# Patient Record
Sex: MALE | Race: WHITE | Employment: FULL TIME | URBAN - METROPOLITAN AREA
[De-identification: names, ages, dates, MRNs, and addresses within clinical notes are randomized per-mention and may not be internally consistent; named-entity substitution may affect disease eponyms.]

---

## 2017-01-04 DIAGNOSIS — J45.40 MODERATE PERSISTENT ASTHMA WITHOUT COMPLICATION: Primary | ICD-10-CM

## 2017-01-11 RX ORDER — AMIODARONE HYDROCHLORIDE 200 MG/1
200 TABLET ORAL DAILY
Qty: 30 TAB | Refills: 6 | Status: SHIPPED | OUTPATIENT
Start: 2017-01-11 | End: 2017-05-22 | Stop reason: ALTCHOICE

## 2017-01-11 NOTE — TELEPHONE ENCOUNTER
Requested Prescriptions     Signed Prescriptions Disp Refills    amiodarone (CORDARONE) 200 mg tablet 30 Tab 6     Sig: Take 1 Tab by mouth daily.      Authorizing Provider: Mirlande Parekh     Ordering User: Mahamed Whitten

## 2017-01-14 DIAGNOSIS — E78.00 HYPERCHOLESTEROLEMIA WITHOUT HYPERTRIGLYCERIDEMIA: ICD-10-CM

## 2017-01-14 RX ORDER — ATORVASTATIN CALCIUM 40 MG/1
TABLET, FILM COATED ORAL
Qty: 30 TAB | Refills: 0 | Status: SHIPPED | OUTPATIENT
Start: 2017-01-14 | End: 2017-04-04 | Stop reason: SDUPTHER

## 2017-04-04 ENCOUNTER — OFFICE VISIT (OUTPATIENT)
Dept: INTERNAL MEDICINE CLINIC | Age: 52
End: 2017-04-04

## 2017-04-04 VITALS
HEIGHT: 71 IN | DIASTOLIC BLOOD PRESSURE: 69 MMHG | SYSTOLIC BLOOD PRESSURE: 138 MMHG | OXYGEN SATURATION: 96 % | RESPIRATION RATE: 14 BRPM | BODY MASS INDEX: 31.71 KG/M2 | HEART RATE: 72 BPM | WEIGHT: 226.5 LBS | TEMPERATURE: 97.4 F

## 2017-04-04 DIAGNOSIS — Z13.31 SCREENING FOR DEPRESSION: ICD-10-CM

## 2017-04-04 DIAGNOSIS — I48.0 PAROXYSMAL ATRIAL FIBRILLATION (HCC): ICD-10-CM

## 2017-04-04 DIAGNOSIS — E78.00 HYPERCHOLESTEROLEMIA WITHOUT HYPERTRIGLYCERIDEMIA: ICD-10-CM

## 2017-04-04 DIAGNOSIS — F41.9 ANXIETY: ICD-10-CM

## 2017-04-04 DIAGNOSIS — Z11.59 NEED FOR HEPATITIS C SCREENING TEST: ICD-10-CM

## 2017-04-04 DIAGNOSIS — Z12.11 COLON CANCER SCREENING: ICD-10-CM

## 2017-04-04 DIAGNOSIS — K21.9 GASTROESOPHAGEAL REFLUX DISEASE WITHOUT ESOPHAGITIS: ICD-10-CM

## 2017-04-04 DIAGNOSIS — Z86.79 H/O COMPLETE ATRIOVENTRICULAR BLOCK: ICD-10-CM

## 2017-04-04 DIAGNOSIS — S90.222A CONTUSION OF LESSER TOE OF LEFT FOOT WITH DAMAGE TO NAIL, INITIAL ENCOUNTER: ICD-10-CM

## 2017-04-04 DIAGNOSIS — I47.29 PAROXYSMAL VT: ICD-10-CM

## 2017-04-04 DIAGNOSIS — Q20.5 CORRECTED TRANSPOSITION OF GREAT VESSELS: ICD-10-CM

## 2017-04-04 DIAGNOSIS — I49.5 SINOATRIAL NODE DYSFUNCTION (HCC): ICD-10-CM

## 2017-04-04 DIAGNOSIS — Z95.810 ICD (IMPLANTABLE CARDIOVERTER-DEFIBRILLATOR) IN PLACE: ICD-10-CM

## 2017-04-04 DIAGNOSIS — Z79.899 HIGH RISK MEDICATION USE: ICD-10-CM

## 2017-04-04 DIAGNOSIS — J45.40 MODERATE PERSISTENT ASTHMA WITHOUT COMPLICATION: Primary | ICD-10-CM

## 2017-04-04 RX ORDER — ALPRAZOLAM 0.5 MG/1
0.5 TABLET ORAL
Qty: 20 TAB | Refills: 0 | Status: SHIPPED | OUTPATIENT
Start: 2017-04-04 | End: 2018-08-23 | Stop reason: SDUPTHER

## 2017-04-04 RX ORDER — LORATADINE 10 MG/1
10 TABLET ORAL
COMMUNITY
End: 2018-08-23 | Stop reason: ALTCHOICE

## 2017-04-04 RX ORDER — ALBUTEROL SULFATE 90 UG/1
AEROSOL, METERED RESPIRATORY (INHALATION)
Qty: 1 INHALER | Refills: 2 | Status: SHIPPED | OUTPATIENT
Start: 2017-04-04

## 2017-04-04 RX ORDER — ASPIRIN 325 MG
325 TABLET ORAL DAILY
Qty: 90 TAB | Refills: 3
Start: 2017-04-04 | End: 2022-03-18

## 2017-04-04 RX ORDER — ATORVASTATIN CALCIUM 40 MG/1
TABLET, FILM COATED ORAL
Qty: 30 TAB | Refills: 11 | Status: SHIPPED | OUTPATIENT
Start: 2017-04-04 | End: 2018-04-13 | Stop reason: SDUPTHER

## 2017-04-04 NOTE — PROGRESS NOTES
Reviewed record In preparation for visit and have obtained necessary documentation. Given info on advanced directives. 1. Have you been to the ER, urgent care clinic or hospitalized since your last visit? No     2. Have you seen or consulted any other health care providers outside of the [address] [place] [address] since your last visit? Include any pap smears or colon screening.  Cardiology     [facility] reviewed:

## 2017-04-04 NOTE — MR AVS SNAPSHOT
Visit Information Date & Time Provider Department Dept. Phone Encounter #  
 4/4/2017  3:00 PM William GodoyLayla [phone] [id number] Follow-up Instructions Return in about 1 year (around 4/4/2018) for Asthma, GERD Chol   Fasting lab in 4-6 weeks. .  
  
Your Appointments 5/22/2017  3:20 PM  
ESTABLISHED PATIENT with David Zuleta MD  
[facility] ([address]) Appt Note: previous patient of Dr. Skylar Cantu, and Dr. Jenny Li requested to come back to [facility] due to transportation reasons. Informed pt of the 1 provider change rule. [address]  [address] [facility]  
[address] 1000 [facility]  
  
    
 12/13/2017  3:15 PM  
PACEMAKER with PACEMAKER, STFRANCES  
[facility] (GINGER SCHEDULING) Appt Note: ariela sci icd./stf/new lat schedule [address] [facility] [address] 600 1007 [facility]  
54 [facility] [address] [address] Upcoming Health Maintenance Date Due Hepatitis C Screening 1965 COLONOSCOPY 5/19/2016 DTaP/Tdap/Td series (2 - Td) 12/10/2022 Allergies as of 4/4/2017  Review Complete On: 4/4/2017 By: William Godoy MD  
 No Known Allergies Current Immunizations  Reviewed on 4/4/2017 Name Date H1N1 FLU VACCINE 11/10/2009 Influenza Vaccine 10/6/2016, 10/5/2014 Influenza Vaccine Whole 10/6/2012 Pneumococcal Polysaccharide (PPSV-23) 12/11/2015 Pneumococcal Vaccine (Unspecified Type) 9/1/2006 TD Vaccine 8/1/2004 Tdap 12/10/2012 Reviewed by Vannessa Ang LPN on 3/3/5937 at  8:25 PM  
You Were Diagnosed With   
  
 Codes Comments Moderate persistent asthma without complication    -  Primary ICD-10-CM: J45.40 ICD-9-CM: 493.90  Hypercholesterolemia without hypertriglyceridemia     ICD-10-CM: E78.00 
 ICD-9-CM: 272.0 Anxiety     ICD-10-CM: F41.9 ICD-9-CM: 300.00 Corrected transposition of great vessels     ICD-10-CM: Q20.5 ICD-9-CM: 745.12 Paroxysmal atrial fibrillation (HCC)     ICD-10-CM: I48.0 ICD-9-CM: 427.31 Paroxysmal VT ([address]     ICD-10-CM: I47.2 ICD-9-CM: 427.1 Sinoatrial node dysfunction (HCC)     ICD-10-CM: I49.5 ICD-9-CM: 46.80   
 H/O complete atrioventricular block     ICD-10-CM: Z86.79 
ICD-9-CM: V12.59   
 ICD (implantable cardioverter-defibrillator) in place     ICD-10-CM: Z95.810 ICD-9-CM: V45.02 Gastroesophageal reflux disease without esophagitis     ICD-10-CM: K21.9 ICD-9-CM: 530.81 Contusion of lesser toe of left foot with damage to nail, initial encounter     ICD-10-CM: I04.463X ICD-9-CM: 924.3 Colon cancer screening     ICD-10-CM: Z12.11 ICD-9-CM: V76.51 High risk medication use     ICD-10-CM: Z79.899 ICD-9-CM: V58.69 Need for hepatitis C screening test     ICD-10-CM: Z11.59 
ICD-9-CM: V73.89 Vitals BP Pulse Temp Resp Height(growth percentile) Weight(growth percentile)  
 138/69 (BP 1 Location: Left arm, BP Patient Position: Sitting) 72 97.4 °F (36.3 °C) (Oral) 14 5' 11\" (1.803 m) 226 lb 8 oz (102.7 kg) SpO2 BMI Smoking Status 96% 31.59 kg/m2 Never Smoker Vitals History BMI and BSA Data Body Mass Index Body Surface Area  
 31.59 kg/m 2 2.27 m 2 Preferred Pharmacy Pharmacy Name Phone [facility], [facility] [address] [place] [phone] Your Updated Medication List  
  
   
This list is accurate as of: 4/4/17  4:31 PM.  Always use your most recent med list.  
  
  
  
  
 albuterol 90 mcg/actuation inhaler Commonly known as:  VENTOLIN HFA INHALE TWO PUFFS BY MOUTH EVERY 4 HOURS AS NEEDED FOR  WHEEZING  
  
 ALPRAZolam 0.5 mg tablet Commonly known as:  Pama Bob Take 1 Tab by mouth two (2) times daily as needed for Anxiety. Max Daily Amount: 1 mg. amiodarone 200 mg tablet Commonly known as:  CORDARONE Take 1 Tab by mouth daily. aspirin 325 mg tablet Commonly known as:  ASPIRIN Take 1 Tab by mouth daily. atorvastatin 40 mg tablet Commonly known as:  LIPITOR  
TAKE ONE TABLET BY MOUTH DAILY. CLARITIN 10 mg tablet Generic drug:  loratadine Take 10 mg by mouth.  
  
 enalapril 5 mg tablet Commonly known as:  Amadeo Gaffney Take 1 Tab by mouth two (2) times a day. metoprolol succinate 50 mg XL tablet Commonly known as:  TOPROL-XL  
TAKE ONE TABLET BY MOUTH ONCE DAILY  
  
 mometasone-formoterol 200-5 mcg/actuation HFA inhaler Commonly known as:  Sindyjason Abhi Take 2 Puffs by inhalation two (2) times a day. Prescriptions Printed Refills ALPRAZolam (XANAX) 0.5 mg tablet 0 Sig: Take 1 Tab by mouth two (2) times daily as needed for Anxiety. Max Daily Amount: 1 mg. Class: Print Route: Oral  
  
Prescriptions Sent to Pharmacy Refills  
 atorvastatin (LIPITOR) 40 mg tablet 11 Sig: TAKE ONE TABLET BY MOUTH DAILY. Class: Normal  
 Pharmacy: [facility] Ph #: [phone]  
 albuterol (VENTOLIN HFA) 90 mcg/actuation inhaler 2 Sig: INHALE TWO PUFFS BY MOUTH EVERY 4 HOURS AS NEEDED FOR  WHEEZING Class: Normal  
 Pharmacy: [address] [place] [address], [address] [place] [address] Ph #: [phone] We Performed the Following REFERRAL FOR COLONOSCOPY [GIK242 Custom] REFERRAL TO CARDIOLOGY [KWH77 Custom] Follow-up Instructions Return in about 1 year (around 4/4/2018) for Asthma, GERD Chol   Fasting lab in 4-6 weeks. Sidney Barthel To-Do List   
 05/04/2017 Lab:  CBC WITH AUTOMATED DIFF   
  
 05/04/2017 Lab:  HEPATITIS C AB   
  
 05/04/2017 Lab:  LIPID PANEL   
  
 05/04/2017 Lab:  METABOLIC PANEL, COMPREHENSIVE   
  
 05/04/2017 Lab:  TSH REFLEX TO T4 Referral Information Referral ID Referred By Referred To  
  
 [id number] LITO PETERSONjoann 98   
   305 [facility] [address] [place], [address] [facility] Visits Status Start Date End Date 1 New Request 4/4/17 4/4/18 If your referral has a status of pending review or denied, additional information will be sent to support the outcome of this decision. Referral ID Referred By Referred To  
 [id number] [facility], [address] MD [address]  [address] Phone: [phone] Fax: [phone] Visits Status Start Date End Date 1 New Request 4/4/17 4/4/18 If your referral has a status of pending review or denied, additional information will be sent to support the outcome of this decision. Patient Instructions Restart atorvastatin Fasting lab on 4-6 weeks. Make appointment with Dr Bakari Vegas Take Pepcid AC before supper for reflux Office visit in 12 months. Gastroesophageal Reflux Disease (GERD): Care Instructions Your Care Instructions Gastroesophageal reflux disease (GERD) is the backward flow of stomach acid into the esophagus. The esophagus is the tube that leads from your throat to your stomach. A one-way valve prevents the stomach acid from moving up into this tube. When you have GERD, this valve does not close tightly enough. If you have mild GERD symptoms including heartburn, you may be able to control the problem with antacids or over-the-counter medicine. Changing your diet, losing weight, and making other lifestyle changes can also help reduce symptoms. Follow-up care is a key part of your treatment and safety. Be sure to make and go to all appointments, and call your doctor if you are having problems. Its also a good idea to know your test results and keep a list of the medicines you take. How can you care for yourself at home? · Take your medicines exactly as prescribed. Call your doctor if you think you are having a problem with your medicine. · Your doctor may recommend over-the-counter medicine. For mild or occasional indigestion, antacids, such as Tums, Gaviscon, Mylanta, or Maalox, may help. Your doctor also may recommend over-the-counter acid reducers, such as Pepcid AC, Tagamet HB, Zantac 75, or Prilosec. Read and follow all instructions on the label. If you use these medicines often, talk with your doctor. · Change your eating habits. ¨ Its best to eat several small meals instead of two or three large meals. ¨ After you eat, wait 2 to 3 hours before you lie down. ¨ Chocolate, mint, and alcohol can make GERD worse. ¨ Spicy foods, foods that have a lot of acid (like tomatoes and oranges), and coffee can make GERD symptoms worse in some people. If your symptoms are worse after you eat a certain food, you may want to stop eating that food to see if your symptoms get better. · Do not smoke or chew tobacco. Smoking can make GERD worse. If you need help quitting, talk to your doctor about stop-smoking programs and medicines. These can increase your chances of quitting for good. · If you have GERD symptoms at night, raise the head of your bed 6 to 8 inches by putting the frame on blocks or placing a foam wedge under the head of your mattress. (Adding extra pillows does not work.) · Do not wear tight clothing around your middle. · Lose weight if you need to. Losing just 5 to 10 pounds can help. When should you call for help? Call your doctor now or seek immediate medical care if: 
· You have new or different belly pain. · Your stools are black and tarlike or have streaks of blood. Watch closely for changes in your health, and be sure to contact your doctor if: 
· Your symptoms have not improved after 2 days. · Food seems to catch in your throat or chest. 
Where can you learn more? Go to http://genesis-vicky.info/. Enter Z851 in the search box to learn more about \"Gastroesophageal Reflux Disease (GERD): Care Instructions. \" Current as of: August 9, 2016 Content Version: 11.2 © 0242-9068 [employer]. Care instructions adapted under license by [employer] (which disclaims liability or warranty for this information). If you have questions about a medical condition or this instruction, always ask your healthcare professional. [address] any warranty or liability for your use of this information. Introducing [facility]! Dear Devin Goldman: Thank you for requesting a Zhenai account. Our records indicate that you already have an active Zhenai account. You can access your account anytime at [email or web address] Did you know that you can access your hospital and ER discharge instructions at any time in Zhenai? You can also review all of your test results from your hospital stay or ER visit. Additional Information If you have questions, please visit the Frequently Asked Questions section of the Zhenai website at [email or web address] Remember, Zhenai is NOT to be used for urgent needs. For medical emergencies, dial 911. Now available from your iPhone and Android! Please provide this summary of care documentation to your next provider. Your primary care clinician is listed as Mario Diaz. If you have any questions after today's visit, please call [phone].

## 2017-04-04 NOTE — PROGRESS NOTES
HISTORY OF PRESENT ILLNESS  Hilaria Son is a 46 y.o. male. HPI   He presents for follow up of hypertension, hyperlipidemia. 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 He is asking for a recommendation for a new cardiologist.  He feels those he has seen recently are not very attentive and in some cases he does not even recall being examined. Diet and Lifestyle: generally follows a low sodium diet, low fat diet, exercises sporadically, non-smoker  Medication compliance: complaint all the time except cholesterol med. Thinks it increases reflux  Medication side effects: none  Home BP Monitoring: is well controlled at home, ranging 120's/80's. Cardiovascular ROS:  He complains of palpitations, dyspnea on exertion, dizziness (for seconds at at time). He denies orthopnea, exertional chest pressure/discomfort, claudication, lower extremity edema    He is being seen for follow up of asthma, not currently in exacerbation. taking medications as instructed, no medication side effects noted, no significant ongoing wheezing, using bronchodilator MDI less than twice a week. Hehas shortness of breath walking a few feet. Exercise tolerance is moderate intolerance and energy level is decreased. Uses rescue inhaler:0  times /week    Requests prostate exam    He sustained an injury to the left third toe on 4/1. He dropped a 2 x 4 on his foot. The nail has been disrupted. There continues to be some bleeding from under the nail. The toe is swollen and bruised, but not very painful. Has awakened several times choking from regurgitation. He denies dysphagia or heartburn. He has elevated head of bed and no long lies down soon after eating. This has helped. He presents for follow up of anxiety. Ongoing symptoms include: palpitations. Patient denies: racing thoughts, psychomotor agitation, feelings of losing control, difficulty concentrating. Reported side effects from the treatment: none. PHQ 2 / 9, over the last two weeks 4/4/2017   Little interest or pleasure in doing things Not at all   Feeling down, depressed or hopeless Not at all   Total Score PHQ 2 0     He was born in the [facility] and is a candidate for Hepatitis C screening. Patient Active Problem List   Diagnosis Code    Hypercholesterolemia without hypertriglyceridemia E78.00    Hypertension, essential, benign I10    History of colonoscopy with polypectomy Z98.890, Z86.010    Corrected transposition of great vessels Q20.5    H/O complete atrioventricular block Z86.79    Sinoatrial node dysfunction (HCC) I49.5    Paroxsymal atrial fibrillation I48.91    Paroxysmal VT ([address] I47.2    ICD (implantable cardioverter-defibrillator) in place Z95.810    Moderate persistent asthma without complication V78.96     Past Medical History:   Diagnosis Date    Anxiety     anxiety vs panic disorder    Arrhythmia     Atiral fibrillation, paroxysmal V tach    Asthma     Atrioventricular block, complete ([address]     Congenital heart problem     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  Glucose intolerance 8/25/2010    Hypercholesterolemia     Hypertension     ICD (implantable cardiac defibrillator) in place 6/2008    [facility] Scientific # [id number].  Pacemaker      Past Surgical History:   Procedure Laterality Date    CARDIAC CATHETERIZATION  9/13/2011    Hemodynamics - RA 5, RV 43/12 LVEDP12, RV sat 81% FA 98%, systemic ventricular function diminished, normal coronaries    HX APPENDECTOMY      HX HEART CATHETERIZATION      HX OTHER SURGICAL      Corrected transposition of the great vessels. Quentin Friday PACEMAKER  6/2008     [facility] Scientific # [id number].      Social History     Social History    Marital status:      Spouse name: N/A    Number of children: N/A    Years of education: N/A     Social History Main Topics    Smoking status: Never Smoker    Smokeless tobacco: Former User    Alcohol use 14.4 oz/week     6 Cans of beer, 18 Standard drinks or equivalent per week      Comment: WEEKENDS    Drug use: No    Sexual activity: Yes     Partners: Female     Other Topics Concern    None     Social History Narrative     Family History   Problem Relation Age of Onset    High Cholesterol Mother     COPD Mother     Diabetes Father     COPD Father     Heart Disease Other      No Known Allergies  Current Outpatient Prescriptions   Medication Sig Dispense Refill    loratadine (CLARITIN) 10 mg tablet Take 10 mg by mouth.  aspirin (ASPIRIN) 325 mg tablet Take 1 Tab by mouth daily. 90 Tab 3    ALPRAZolam (XANAX) 0.5 mg tablet Take 1 Tab by mouth two (2) times daily as needed for Anxiety. Max Daily Amount: 1 mg. 20 Tab 0    atorvastatin (LIPITOR) 40 mg tablet TAKE ONE TABLET BY MOUTH DAILY. 30 Tab 11    albuterol (VENTOLIN HFA) 90 mcg/actuation inhaler INHALE TWO PUFFS BY MOUTH EVERY 4 HOURS AS NEEDED FOR  WHEEZING 1 Inhaler 2    amiodarone (CORDARONE) 200 mg tablet Take 1 Tab by mouth daily. 30 Tab 6    mometasone-formoterol (DULERA) 200-5 mcg/actuation HFA inhaler Take 2 Puffs by inhalation two (2) times a day. 1 Inhaler 11    metoprolol succinate (TOPROL-XL) 50 mg XL tablet TAKE ONE TABLET BY MOUTH ONCE DAILY 30 Tab 6    enalapril (VASOTEC) 5 mg tablet Take 1 Tab by mouth two (2) times a day. 180 Tab 1               Review of Systems   Constitutional: Positive for malaise/fatigue. Negative for weight loss. Gastrointestinal: Positive for heartburn. Negative for constipation and diarrhea. Genitourinary: Negative for frequency and urgency. Musculoskeletal: Positive for back pain and joint pain. Neurological: Positive for tingling (in both arms and sometimes legs; he thinks from amiodarone ).  Negative for dizziness and focal weakness. Visit Vitals    /69 (BP 1 Location: Left arm, BP Patient Position: Sitting)    Pulse 72    Temp 97.4 °F (36.3 °C) (Oral)    Resp 14    Ht 5' 11\" (1.803 m)    Wt 226 lb 8 oz (102.7 kg)    SpO2 96%    BMI 31.59 kg/m2     Physical Exam   Constitutional: He is oriented to person, place, and time. He appears well-developed and well-nourished. HENT:   Head: Normocephalic and atraumatic. Eyes: Conjunctivae are normal. Pupils are equal, round, and reactive to light. Neck: Neck supple. Carotid bruit is not present. No thyromegaly present. Cardiovascular: Normal rate, regular rhythm and S2 normal.  PMI is not displaced. Exam reveals no gallop. Murmur heard. Systolic (At the base, ) murmur is present with a grade of 2/6   Pulses:       Dorsalis pedis pulses are 2+ on the right side, and 2+ on the left side. Posterior tibial pulses are 2+ on the right side, and 2+ on the left side. Very loud S1   Pulmonary/Chest: Effort normal. He has no wheezes. He has no rhonchi. He has no rales. Abdominal: Soft. Normal appearance. He exhibits no abdominal bruit and no mass. There is no hepatosplenomegaly. There is no tenderness. Genitourinary: Prostate normal. Rectal exam shows no mass, no tenderness and anal tone normal. Prostate is not enlarged and not tender. Musculoskeletal: He exhibits no edema. Feet:    Lymphadenopathy:     He has no cervical adenopathy. Right: No supraclavicular adenopathy present. Left: No inguinal and no supraclavicular adenopathy present. Neurological: He is alert and oriented to person, place, and time. No sensory deficit. Skin: Skin is warm, dry and intact. No rash noted. Psychiatric: He has a normal mood and affect. His behavior is normal.   Nursing note and vitals reviewed.     Results for orders placed or performed in visit on 10/31/16   TSH 3RD GENERATION   Result Value Ref Range    TSH 3.380 0.450 - 4.500 uIU/mL HEPATIC FUNCTION PANEL   Result Value Ref Range    Protein, total 7.0 6.0 - 8.5 g/dL    Albumin 4.7 3.5 - 5.5 g/dL    Bilirubin, total 0.5 0.0 - 1.2 mg/dL    Bilirubin, direct 0.13 0.00 - 0.40 mg/dL    Alk. phosphatase 52 39 - 117 IU/L    AST (SGOT) 17 0 - 40 IU/L    ALT (SGPT) 23 0 - 44 IU/L     Lab Results   Component Value Date/Time    Cholesterol, total 190 08/03/2015 10:29 AM    HDL Cholesterol 56 08/03/2015 10:29 AM    LDL, calculated 115 08/03/2015 10:29 AM    VLDL, calculated 19 08/03/2015 10:29 AM    Triglyceride 96 08/03/2015 10:29 AM    CHOL/HDL Ratio 3.0 11/04/2009 09:05 AM       ASSESSMENT and PLAN    ICD-10-CM ICD-9-CM    1. Moderate persistent asthma without complication E63.46 384.07 albuterol (VENTOLIN HFA) 90 mcg/actuation inhaler   2. Hypercholesterolemia without hypertriglyceridemia E78.00 272.0 aspirin (ASPIRIN) 325 mg tablet      LIPID PANEL      METABOLIC PANEL, COMPREHENSIVE      atorvastatin (LIPITOR) 40 mg tablet   3. Anxiety F41.9 300.00 ALPRAZolam (XANAX) 0.5 mg tablet   4. Corrected transposition of great vessels Q20.5 745.12 REFERRAL TO CARDIOLOGY   5. Gastroesophageal reflux disease without esophagitis K21.9 530.81    6. Contusion of lesser toe of left foot with damage to nail, initial encounter S90.222A 924.3    7. Paroxysmal atrial fibrillation (HCC) I48.0 427.31 REFERRAL TO CARDIOLOGY   8. Paroxysmal VT (HCC) I47.2 427.1 REFERRAL TO CARDIOLOGY   9. Sinoatrial node dysfunction (HCC) I49.5 427.81 REFERRAL TO CARDIOLOGY   10. H/O complete atrioventricular block Z86.79 V12.59 REFERRAL TO CARDIOLOGY   11. ICD (implantable cardioverter-defibrillator) in place Z95.810 V45.02 REFERRAL TO CARDIOLOGY   12. Colon cancer screening Z12.11 V76.51 REFERRAL FOR COLONOSCOPY   13. High risk medication use Z79.899 V58.69 TSH REFLEX TO T4      CBC WITH AUTOMATED DIFF   14.  Need for hepatitis C screening test Z11.59 V73.89 HEPATITIS C AB         Moderate persistent asthma without complication  - albuterol (VENTOLIN HFA) 90 mcg/actuation inhaler; INHALE TWO PUFFS BY MOUTH EVERY 4 HOURS AS NEEDED FOR  WHEEZING    Hypercholesterolemia without hypertriglyceridemia  Hyperlipidemia is borderline controlled. -     aspirin (ASPIRIN) 325 mg tablet; Take 1 Tab by mouth daily.  -     LIPID PANEL; Future  -     METABOLIC PANEL, COMPREHENSIVE; Future  -     atorvastatin (LIPITOR) 40 mg tablet; TAKE ONE TABLET BY MOUTH DAILY. Anxiety  -     ALPRAZolam (XANAX) 0.5 mg tablet; Take 1 Tab by mouth two (2) times daily as needed for Anxiety. Max Daily Amount: 1 mg. Corrected transposition of great vessels  -     REFERRAL TO CARDIOLOGY    Gastroesophageal reflux disease without esophagitis  He will take over-the-counter Pepcid before supper  . Contusion of lesser toe of left foot with damage to nail, initial encounter  He is told he will lose the nail. It is possible that there is a broken bone, but there is little to do about that and he declines an x-ray.  l  Paroxysmal atrial fibrillation ([address]  -     REFERRAL TO CARDIOLOGY    Paroxysmal VT ([address]  -     REFERRAL TO CARDIOLOGY    Sinoatrial node dysfunction ([address]  -     REFERRAL TO CARDIOLOGY    H/O complete atrioventricular block  -     REFERRAL TO CARDIOLOGY    ICD (implantable cardioverter-defibrillator) in place  -     REFERRAL TO CARDIOLOGY    Colon cancer screening  -     REFERRAL FOR COLONOSCOPY    High risk medication use  -     TSH REFLEX TO T4; Future  -     CBC WITH AUTOMATED DIFF; Future    Need for hepatitis C screening test  -     HEPATITIS C AB; Future      Follow-up Disposition:  Return in about 1 year (around 4/4/2018) for Asthma, GERD Chol   Fasting lab in 4-6 weeks.  .  lab results and schedule of future lab studies reviewed with patient  reviewed diet, exercise and weight control  cardiovascular risk and specific lipid/LDL goals reviewed

## 2017-04-04 NOTE — PATIENT INSTRUCTIONS
Restart atorvastatin  Fasting lab on 4-6 weeks. Make appointment with Dr Lizzie Hare before supper for reflux  Office visit in 12 months. Gastroesophageal Reflux Disease (GERD): Care Instructions  Your Care Instructions    Gastroesophageal reflux disease (GERD) is the backward flow of stomach acid into the esophagus. The esophagus is the tube that leads from your throat to your stomach. A one-way valve prevents the stomach acid from moving up into this tube. When you have GERD, this valve does not close tightly enough. If you have mild GERD symptoms including heartburn, you may be able to control the problem with antacids or over-the-counter medicine. Changing your diet, losing weight, and making other lifestyle changes can also help reduce symptoms. Follow-up care is a key part of your treatment and safety. Be sure to make and go to all appointments, and call your doctor if you are having problems. Its also a good idea to know your test results and keep a list of the medicines you take. How can you care for yourself at home? · Take your medicines exactly as prescribed. Call your doctor if you think you are having a problem with your medicine. · Your doctor may recommend over-the-counter medicine. For mild or occasional indigestion, antacids, such as Tums, Gaviscon, Mylanta, or Maalox, may help. Your doctor also may recommend over-the-counter acid reducers, such as Pepcid AC, Tagamet HB, Zantac 75, or Prilosec. Read and follow all instructions on the label. If you use these medicines often, talk with your doctor. · Change your eating habits. ¨ Its best to eat several small meals instead of two or three large meals. ¨ After you eat, wait 2 to 3 hours before you lie down. ¨ Chocolate, mint, and alcohol can make GERD worse. ¨ Spicy foods, foods that have a lot of acid (like tomatoes and oranges), and coffee can make GERD symptoms worse in some people.  If your symptoms are worse after you eat a certain food, you may want to stop eating that food to see if your symptoms get better. · Do not smoke or chew tobacco. Smoking can make GERD worse. If you need help quitting, talk to your doctor about stop-smoking programs and medicines. These can increase your chances of quitting for good. · If you have GERD symptoms at night, raise the head of your bed 6 to 8 inches by putting the frame on blocks or placing a foam wedge under the head of your mattress. (Adding extra pillows does not work.)  · Do not wear tight clothing around your middle. · Lose weight if you need to. Losing just 5 to 10 pounds can help. When should you call for help? Call your doctor now or seek immediate medical care if:  · You have new or different belly pain. · Your stools are black and tarlike or have streaks of blood. Watch closely for changes in your health, and be sure to contact your doctor if:  · Your symptoms have not improved after 2 days. · Food seems to catch in your throat or chest.  Where can you learn more? Go to http://genesis-vicky.info/. Enter U392 in the search box to learn more about \"Gastroesophageal Reflux Disease (GERD): Care Instructions. \"  Current as of: August 9, 2016  Content Version: 11.2  © 8996-8081 [employer]. Care instructions adapted under license by [employer] (which disclaims liability or warranty for this information). If you have questions about a medical condition or this instruction, always ask your healthcare professional. [place] [address] any warranty or liability for your use of this information.

## 2017-04-10 ENCOUNTER — TELEPHONE (OUTPATIENT)
Dept: CARDIOLOGY CLINIC | Age: 52
End: 2017-04-10

## 2017-04-10 NOTE — TELEPHONE ENCOUNTER
Pt need a call back because he's having a Colonoscopy on 4/21 and need to know if he need to stop any of his medications. He can be reached at [phone].  [employer]

## 2017-04-10 NOTE — TELEPHONE ENCOUNTER
Verified patient with two types of identifiers. Patient wanted to check to see if he needed to hold anything. The GI doctor states that they did not need him to hold anything but he wanted to check with us. Notified him that he did not need to hold any medications prior to the colonoscopy. Notified him to let the GI doc know he has an ICD as this may require an order from us. He states that the GI doctor knows about his ICD and that they did not need anything else from us. Will call if any other questions.

## 2017-04-21 ENCOUNTER — ANESTHESIA (OUTPATIENT)
Dept: ENDOSCOPY | Age: 52
End: 2017-04-21
Payer: COMMERCIAL

## 2017-04-21 ENCOUNTER — HOSPITAL ENCOUNTER (OUTPATIENT)
Age: 52
Setting detail: OUTPATIENT SURGERY
Discharge: HOME | End: 2017-04-21
Attending: INTERNAL MEDICINE | Admitting: INTERNAL MEDICINE
Payer: COMMERCIAL

## 2017-04-21 ENCOUNTER — ANESTHESIA EVENT (OUTPATIENT)
Dept: ENDOSCOPY | Age: 52
End: 2017-04-21
Payer: COMMERCIAL

## 2017-04-21 VITALS
HEIGHT: 71 IN | BODY MASS INDEX: 31.16 KG/M2 | OXYGEN SATURATION: 99 % | RESPIRATION RATE: 18 BRPM | HEART RATE: 69 BPM | TEMPERATURE: 97.6 F | WEIGHT: 222.56 LBS | DIASTOLIC BLOOD PRESSURE: 74 MMHG | SYSTOLIC BLOOD PRESSURE: 123 MMHG

## 2017-04-21 PROCEDURE — 74011000250 HC RX REV CODE- 250

## 2017-04-21 PROCEDURE — 74011250636 HC RX REV CODE- 250/636: Performed by: INTERNAL MEDICINE

## 2017-04-21 PROCEDURE — 76060000031 HC ANESTHESIA FIRST 0.5 HR: Performed by: INTERNAL MEDICINE

## 2017-04-21 PROCEDURE — 74011250636 HC RX REV CODE- 250/636

## 2017-04-21 PROCEDURE — 76040000019: Performed by: INTERNAL MEDICINE

## 2017-04-21 PROCEDURE — 74011250637 HC RX REV CODE- 250/637: Performed by: INTERNAL MEDICINE

## 2017-04-21 RX ORDER — SODIUM CHLORIDE 0.9 % (FLUSH) 0.9 %
5-10 SYRINGE (ML) INJECTION AS NEEDED
Status: DISCONTINUED | OUTPATIENT
Start: 2017-04-21 | End: 2017-04-21 | Stop reason: HOSPADM

## 2017-04-21 RX ORDER — DEXTROMETHORPHAN/PSEUDOEPHED 2.5-7.5/.8
1.2 DROPS ORAL
Status: DISCONTINUED | OUTPATIENT
Start: 2017-04-21 | End: 2017-04-21 | Stop reason: HOSPADM

## 2017-04-21 RX ORDER — SODIUM CHLORIDE 0.9 % (FLUSH) 0.9 %
5-10 SYRINGE (ML) INJECTION EVERY 8 HOURS
Status: DISCONTINUED | OUTPATIENT
Start: 2017-04-21 | End: 2017-04-21 | Stop reason: HOSPADM

## 2017-04-21 RX ORDER — SODIUM CHLORIDE 9 MG/ML
50 INJECTION, SOLUTION INTRAVENOUS CONTINUOUS
Status: DISCONTINUED | OUTPATIENT
Start: 2017-04-21 | End: 2017-04-21 | Stop reason: HOSPADM

## 2017-04-21 RX ORDER — MIDAZOLAM HYDROCHLORIDE 1 MG/ML
1-2 INJECTION, SOLUTION INTRAMUSCULAR; INTRAVENOUS
Status: DISCONTINUED | OUTPATIENT
Start: 2017-04-21 | End: 2017-04-21 | Stop reason: HOSPADM

## 2017-04-21 RX ORDER — PROPOFOL 10 MG/ML
INJECTION, EMULSION INTRAVENOUS AS NEEDED
Status: DISCONTINUED | OUTPATIENT
Start: 2017-04-21 | End: 2017-04-21 | Stop reason: HOSPADM

## 2017-04-21 RX ORDER — LIDOCAINE HYDROCHLORIDE 20 MG/ML
INJECTION, SOLUTION EPIDURAL; INFILTRATION; INTRACAUDAL; PERINEURAL AS NEEDED
Status: DISCONTINUED | OUTPATIENT
Start: 2017-04-21 | End: 2017-04-21 | Stop reason: HOSPADM

## 2017-04-21 RX ADMIN — SIMETHICONE 80 MG: 20 SUSPENSION/ DROPS ORAL at 07:59

## 2017-04-21 RX ADMIN — SODIUM CHLORIDE 50 ML/HR: 900 INJECTION, SOLUTION INTRAVENOUS at 07:25

## 2017-04-21 RX ADMIN — PROPOFOL 200 MG: 10 INJECTION, EMULSION INTRAVENOUS at 08:13

## 2017-04-21 RX ADMIN — LIDOCAINE HYDROCHLORIDE 40 MG: 20 INJECTION, SOLUTION EPIDURAL; INFILTRATION; INTRACAUDAL; PERINEURAL at 07:52

## 2017-04-21 NOTE — ROUTINE PROCESS
Cornell Fraustomelva  1965  [id number]    Situation:  Verbal report received from: Yarely Wells RN    Procedure: Procedure(s):  COLONOSCOPY    Background:    Preoperative diagnosis: SCREENING  Postoperative diagnosis: normal colon exam    :  Dr. Keira Dennis  Assistant(s): Endoscopy RN-1: Kathryn Meier RN  Endoscopy RN-2: Kavon Rater    Specimens: * No specimens in log *  H. Pylori  no    Assessment:  Intra-procedure medications     Anesthesia gave intra-procedure sedation and medications, see anesthesia flow sheet yes    Intravenous fluids: NS@ KVO     Vital signs stable     Abdominal assessment: round and soft     Recommendation:  Discharge patient per MD order.     Family or Friend   Permission to share finding with family or friend yes

## 2017-04-21 NOTE — ANESTHESIA PREPROCEDURE EVALUATION
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     EKG: pacemaker    3-2015 ECHO: 45% EF, mild AR, MR, TR, and WV   GI/Hepatic/Renal     GERD           Endo/Other  Within defined limits           Other Findings   Comments: 18 oz per week           Physical Exam    Airway  Mallampati: II  TM Distance: 4 - 6 cm  Neck ROM: normal range of motion   Mouth opening: Normal     Cardiovascular    Rhythm: regular  Rate: normal         Dental  No notable dental hx       Pulmonary  Breath sounds clear to auscultation               Abdominal  GI exam deferred       Other Findings            Anesthetic Plan    ASA: 4  Anesthesia type: total IV anesthesia          Induction: Intravenous  Anesthetic plan and risks discussed with: Patient      Pt took hrt and BP meds this am    /89   HR 81  Oxygen Saturation 96% on room air

## 2017-04-21 NOTE — PERIOP NOTES
Anesthesia reports 200 mg Propofol, 40 mg Lidocaine and 400 mL NS given during procedure. Received report from anesthesia staff on vital signs and status of patient.

## 2017-04-21 NOTE — DISCHARGE INSTRUCTIONS
Samia Carney  [id number]  1965    COLON DISCHARGE INSTRUCTIONS  Discomfort:  Redness at IV site- apply warm compress to area; if redness or soreness persist- contact your physician  There may be a slight amount of blood passed from the rectum  Gaseous discomfort- walking, belching will help relieve any discomfort  You may not operate a vehicle for 12 hours  You may not engage in an occupation involving machinery or appliances for rest of today  You may not drink alcoholic beverages for at least 12 hours  Avoid making any critical decisions for at least 24 hour  DIET:   Regular diet    - however -  remember your colon is empty and a heavy meal will produce gas. ACTIVITY:  It is recommended that you spend the remainder of the day resting -  avoid any strenuous activity. CALL M.D. ANY SIGN OF:   Increasing pain, nausea, vomiting  Abdominal distension (swelling)  New increased bleeding (oral or rectal)  Fever (chills)    Follow-up Instructions:   Call Dr. Debi Mobley  Telephone # [phone]  Brief Findings: normal        DISCHARGE SUMMARY from Nurse    The following personal items collected during your admission are returned to you:   Dental Appliance: Dental Appliances: None  Vision: Visual Aid: Glasses  Hearing Aid:    Jewelry:    Clothing:    Other Valuables:    Valuables sent to safe: Cantimer Activation    Thank you for requesting access to Cantimer. Please follow the instructions below to securely access and download your online medical record. Cantimer allows you to send messages to your doctor, view your test results, renew your prescriptions, schedule appointments, and more. How Do I Sign Up? 1. In your internet browser, go to [email or web address]  2. Click on the First Time User? Click Here link in the Sign In box. You will be redirect to the New Member Sign Up page. 3. Enter your Cantimer Access Code exactly as it appears below.  You will not need to use this code after youve completed the sign-up process. If you do not sign up before the expiration date, you must request a new code. GeoVario Access Code: Activation code not generated  Current GeoVario Status: Active (This is the date your GeoVario access code will )    4. Enter the last four digits of your Social Security Number (xxxx) and Date of Birth (mm/dd/yyyy) as indicated and click Submit. You will be taken to the next sign-up page. 5. Create a Haozu.comt ID. This will be your GeoVario login ID and cannot be changed, so think of one that is secure and easy to remember. 6. Create a GeoVario password. You can change your password at any time. 7. Enter your Password Reset Question and Answer. This can be used at a later time if you forget your password. 8. Enter your e-mail address. You will receive e-mail notification when new information is available in [address] 9. Click Sign Up. You can now view and download portions of your medical record. 10. Click the Download Summary menu link to download a portable copy of your medical information. Additional Information    If you have questions, please visit the Frequently Asked Questions section of the GeoVario website at [email or web address] Remember, GeoVario is NOT to be used for urgent needs. For medical emergencies, dial 911.

## 2017-04-21 NOTE — IP AVS SNAPSHOT
[address] [facility] 
[phone] Patient: Kade Orona MRN: [id number] :1965 You are allergic to the following No active allergies Recent Documentation Height Weight BMI Smoking Status 1.803 m 101 kg 31.04 kg/m2 Never Smoker Emergency Contacts Name Discharge Info Relation Home Work Mobile Kelsey Bentley  Spouse [3] [phone] About your hospitalization You were admitted on:  2017 You last received care in the:  \[facility]\"" ENDOSCOPY You were discharged on:  2017 Unit phone number:  [phone] Why you were hospitalized Your primary diagnosis was:  Not on File Providers Seen During Your Hospitalizations Provider Role Specialty Primary office phone Wei Ragland MD Attending Provider Gastroenterology [phone] Your Primary Care Physician (PCP) Primary Care Physician Office Phone Office Fax 8996 [facility], [address] [place] [address] Road [phone] Follow-up Information None Your Appointments Tuesday May 16, 2017  8:15 AM EDT  
LAB with LAB [facility] Abel Zelaya [facility]) [address] [place] [address] [phone] Monday May 22, 2017  3:20 PM EDT  
ESTABLISHED PATIENT with Clara Sheriff MD  
[facility] ([facility]) [address] [place] [address] [address]  
[phone] Current Discharge Medication List  
  
CONTINUE these medications which have CHANGED Dose & Instructions Dispensing Information Comments Morning Noon Evening Bedtime  
 atorvastatin 40 mg tablet Commonly known as:  LIPITOR What changed:   
- when to take this 
- additional instructions Your last dose was: Your next dose is: TAKE ONE TABLET BY MOUTH DAILY. Quantity:  30 Tab Refills:  11  
     
   
   
   
  
 mometasone-formoterol 200-5 mcg/actuation HFA inhaler Commonly known as:  Saundra Rain What changed:   
- when to take this 
- reasons to take this Your last dose was: Your next dose is:    
   
   
 Dose:  2 Puff Take 2 Puffs by inhalation two (2) times a day. Quantity:  1 Inhaler Refills:  11 CONTINUE these medications which have NOT CHANGED Dose & Instructions Dispensing Information Comments Morning Noon Evening Bedtime  
 albuterol 90 mcg/actuation inhaler Commonly known as:  VENTOLIN HFA Your last dose was: Your next dose is:    
   
   
 INHALE TWO PUFFS BY MOUTH EVERY 4 HOURS AS NEEDED FOR  WHEEZING Quantity:  1 Inhaler Refills:  2 ALPRAZolam 0.5 mg tablet Commonly known as:  Honey Gallery Your last dose was: Your next dose is:    
   
   
 Dose:  0.5 mg Take 1 Tab by mouth two (2) times daily as needed for Anxiety. Max Daily Amount: 1 mg. Quantity:  20 Tab Refills:  0  
     
   
   
   
  
 amiodarone 200 mg tablet Commonly known as:  CORDARONE Your last dose was: Your next dose is:    
   
   
 Dose:  200 mg Take 1 Tab by mouth daily. Quantity:  30 Tab Refills:  6  
     
   
   
   
  
 aspirin 325 mg tablet Commonly known as:  ASPIRIN Your last dose was: Your next dose is:    
   
   
 Dose:  325 mg Take 1 Tab by mouth daily. Quantity:  90 Tab Refills:  3 CLARITIN 10 mg tablet Generic drug:  loratadine Your last dose was: Your next dose is:    
   
   
 Dose:  10 mg Take 10 mg by mouth. Refills:  0  
     
   
   
   
  
 enalapril 5 mg tablet Commonly known as:  Tiny Spray Your last dose was: Your next dose is:    
   
   
 Dose:  5 mg Take 1 Tab by mouth two (2) times a day. Quantity:  180 Tab Refills:  1 metoprolol succinate 50 mg XL tablet Commonly known as:  TOPROL-XL Your last dose was: Your next dose is: TAKE ONE TABLET BY MOUTH ONCE DAILY Quantity:  30 Tab Refills:  6 Discharge Instructions Willian Amaro [id number] 
1965 COLON DISCHARGE INSTRUCTIONS Discomfort: 
Redness at IV site- apply warm compress to area; if redness or soreness persist- contact your physician There may be a slight amount of blood passed from the rectum Gaseous discomfort- walking, belching will help relieve any discomfort You may not operate a vehicle for 12 hours You may not engage in an occupation involving machinery or appliances for rest of today You may not drink alcoholic beverages for at least 12 hours Avoid making any critical decisions for at least 24 hour DIET: 
 Regular diet  however -  remember your colon is empty and a heavy meal will produce gas. ACTIVITY: It is recommended that you spend the remainder of the day resting -  avoid any strenuous activity. CALL M.D. ANY SIGN OF: Increasing pain, nausea, vomiting Abdominal distension (swelling) New increased bleeding (oral or rectal) Fever (chills) Follow-up Instructions: 
 Call Dr. Daisy Guallpa Telephone # [phone] Brief Findings: normal 
 
 
 
DISCHARGE SUMMARY from Nurse The following personal items collected during your admission are returned to you:  
Dental Appliance: Dental Appliances: None Vision: Visual Aid: Glasses Hearing Aid:   
Jewelry:   
Clothing:   
Other Valuables:   
Valuables sent to safe: MyChart Activation Thank you for requesting access to [employer]. Please follow the instructions below to securely access and download your online medical record. [employer] allows you to send messages to your doctor, view your test results, renew your prescriptions, schedule appointments, and more. How Do I Sign Up? 1. In your internet browser, go to [email or web address] 
2. Click on the First Time User? Click Here link in the Sign In box. You will be redirect to the New Member Sign Up page. 3. Enter your Arccos Golf Access Code exactly as it appears below. You will not need to use this code after youve completed the sign-up process. If you do not sign up before the expiration date, you must request a new code. Startup Genomehart Access Code: Activation code not generated Current Arccos Golf Status: Active (This is the date your Gri[employer]t access code will ) 4. Enter the last four digits of your Social Security Number (xxxx) and Date of Birth (mm/dd/yyyy) as indicated and click Submit. You will be taken to the next sign-up page. 5. Create a Arccos Golf ID. This will be your Arccos Golf login ID and cannot be changed, so think of one that is secure and easy to remember. 6. Create a Arccos Golf password. You can change your password at any time. 7. Enter your Password Reset Question and Answer. This can be used at a later time if you forget your password. 8. Enter your e-mail address. You will receive e-mail notification when new information is available in [address] 9. Click Sign Up. You can now view and download portions of your medical record. 10. Click the Download Summary menu link to download a portable copy of your medical information. Additional Information If you have questions, please visit the Frequently Asked Questions section of the Arccos Golf website at [email or web address] Remember, Arccos Golf is NOT to be used for urgent needs. For medical emergencies, dial 911. Discharge Orders None Introducing hospitals & [facility] SERVICES! Dear Chandrakant Getting: Thank you for requesting a Arccos Golf account. Our records indicate that you already have an active Arccos Golf account. You can access your account anytime at [email or web address] Did you know that you can access your hospital and ER discharge instructions at any time in MyChart? You can also review all of your test results from your hospital stay or ER visit. Additional Information If you have questions, please visit the Frequently Asked Questions section of the [employer] website at [email or web address] Remember, MyChart is NOT to be used for urgent needs. For medical emergencies, dial 911. Now available from your iPhone and Android! General Information Please provide this summary of care documentation to your next provider. Patient Signature:  ____________________________________________________________ Date:  ____________________________________________________________  
  
[facility] Provider Signature:  ____________________________________________________________ Date:  ____________________________________________________________

## 2017-04-21 NOTE — H&P
Gastroenterology History and Physical    Patient: Nisreen Rosenbaum    Physician: Ty Stratton MD    Vital Signs: Blood pressure 146/89, pulse 85, temperature 97.9 °F (36.6 °C), resp. rate 13, height 5' 11\" (1.803 m), weight 101 kg (222 lb 9 oz), SpO2 100 %. 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  Pacemaker       Past Surgical History:   Procedure Laterality Date    CARDIAC CATHETERIZATION  9/13/2011    Hemodynamics - RA 5, RV 43/12 LVEDP12, RV sat 81% FA 98%, systemic ventricular function diminished, normal coronaries    HX APPENDECTOMY      HX HEART CATHETERIZATION      HX PACEMAKER  6/2008     [facility] Bright Pattern # [id number].       Social History     Social History    Marital status:      Spouse name: N/A    Number of children: N/A    Years of education: N/A     Social History Main Topics    Smoking status: Never Smoker    Smokeless tobacco: Former User    Alcohol use 18.0 oz/week     12 Cans of beer, 18 Standard drinks or equivalent per week      Comment: 1-12 beers per week    Drug use: No    Sexual activity: Yes     Partners: Female     Other Topics Concern    None     Social History Narrative      Family History   Problem Relation Age of Onset    High Cholesterol Mother     COPD Mother     Diabetes Father     COPD Father     No Known Problems Sister     Heart Disease Other        Medications:   Prior to Admission medications    Medication Sig Start Date End Date Taking? Authorizing Provider   loratadine (CLARITIN) 10 mg tablet Take 10 mg by mouth. Yes Historical Provider   aspirin (ASPIRIN) 325 mg tablet Take 1 Tab by mouth daily. 4/4/17  Yes Blayne Melchor MD   atorvastatin (LIPITOR) 40 mg tablet TAKE ONE TABLET BY MOUTH DAILY. Patient taking differently: every evening. TAKE ONE TABLET BY MOUTH DAILY. 4/4/17  Yes Blayne Melchor MD   amiodarone (CORDARONE) 200 mg tablet Take 1 Tab by mouth daily. 1/11/17  Yes Bro Flores MD   metoprolol succinate (TOPROL-XL) 50 mg XL tablet TAKE ONE TABLET BY MOUTH ONCE DAILY 12/13/16  Yes Bro Flores MD   enalapril (VASOTEC) 5 mg tablet Take 1 Tab by mouth two (2) times a day. 8/11/16  Yes Jose Lopez MD   ALPRAZolam Juvenal Bolder) 0.5 mg tablet Take 1 Tab by mouth two (2) times daily as needed for Anxiety. Max Daily Amount: 1 mg. 4/4/17   Blayne Melchor MD   albuterol (VENTOLIN HFA) 90 mcg/actuation inhaler INHALE TWO PUFFS BY MOUTH EVERY 4 HOURS AS NEEDED FOR  WHEEZING 4/4/17   Blayne Melchor MD   mometasone-formoterol [facility]) 200-5 mcg/actuation HFA inhaler Take 2 Puffs by inhalation two (2) times a day. Patient taking differently: Take 2 Puffs by inhalation as needed. 1/4/17   Blayne Melchor MD       Physical Exam:   HEENT: Head: Normocephalic, no lesions, without obvious abnormality.    Heart: regular rate and rhythm, S1, S2 normal, no murmur, click, rub or gallop   Lungs: chest clear, no wheezing, rales, normal symmetric air entry, Heart exam - S1, S2 normal, no murmur, no gallop, rate regular   Abdominal: Bowel sounds are normal, liver is not enlarged, spleen is not enlarged     Signed:  Андрей Pope MD 4/21/2017

## 2017-04-21 NOTE — ANESTHESIA POSTPROCEDURE EVALUATION
Post-Anesthesia Evaluation and Assessment    Patient: Wilton Miguel MRN: [id number]  SSN: [id number]    YOB: 1965  Age: 46 y.o. Sex: male       Cardiovascular Function/Vital Signs  Visit Vitals    /74    Pulse 69    Temp 36.4 °C (97.6 °F)    Resp 18    Ht 5' 11\" (1.803 m)    Wt 101 kg (222 lb 9 oz)    SpO2 99%    BMI 31.04 kg/m2       Patient is status post total IV anesthesia anesthesia for Procedure(s):  COLONOSCOPY. Nausea/Vomiting: None    Postoperative hydration reviewed and adequate. Pain:  Pain Scale 1: Numeric (0 - 10) (04/21/17 5637)  Pain Intensity 1: 0 (04/21/17 7588)   Managed    Neurological Status: At baseline    Mental Status and Level of Consciousness: Arousable    Pulmonary Status:   O2 Device: Room air (04/21/17 1379)   Adequate oxygenation and airway patent    Complications related to anesthesia: None    Post-anesthesia assessment completed.  No concerns    Signed By: Amanda Barraza DO     April 21, 2017

## 2017-04-21 NOTE — ANESTHESIA POSTPROCEDURE EVALUATION
Post-Anesthesia Evaluation and Assessment    Patient: Cielo Lamb MRN: [id number]  SSN: [id number]    YOB: 1965  Age: 46 y.o. Sex: male       Cardiovascular Function/Vital Signs  Visit Vitals    /74    Pulse 69    Temp 36.4 °C (97.6 °F)    Resp 18    Ht 5' 11\" (1.803 m)    Wt 101 kg (222 lb 9 oz)    SpO2 99%    BMI 31.04 kg/m2       Patient is status post total IV anesthesia anesthesia for Procedure(s):  COLONOSCOPY. Nausea/Vomiting: None    Postoperative hydration reviewed and adequate. Pain:  Pain Scale 1: Numeric (0 - 10) (04/21/17 0832)  Pain Intensity 1: 0 (04/21/17 4424)   Managed    Neurological Status: At baseline    Mental Status and Level of Consciousness: Arousable    Pulmonary Status:   O2 Device: Room air (04/21/17 6552)   Adequate oxygenation and airway patent    Complications related to anesthesia: None    Post-anesthesia assessment completed.  No concerns    Signed By: Sanjay Medina DO     April 21, 2017

## 2017-04-21 NOTE — PROCEDURES
Colonoscopy Operative Report  [facility]  1965  [id number]      Procedure Type:   Colonoscopy --screening     Indications:     Screening colonoscopy    Pre-operative Diagnosis: see indication above    Post-operative Diagnosis:  See findings below    : Elías Bennett MD    Referring Provider: Irvin Bello    Sedation:  MAC anesthesia Propofol    Pre-Procedural Exam:      Airway: clear, Malimpati 2   Heart: RRR, without gallops or rubs  Lungs: clear bilaterally without wheezes, crackles, or rhonchi  Abdomen: soft, nontender, nondistended, bowel sounds present     Procedure Details:  After informed consent was obtained with all risks and benefits of procedure explained and preoperative exam completed, the patient was taken to the endoscopy suite and placed in the left lateral decubitus position. Upon sequential sedation as per above, a digital rectal exam was performed. The Olympus videocolonoscope [id number] was inserted in the rectum and carefully advanced to the cecum, which was identified by the ileocecal valve. The quality of preparation was good. The colonoscope was slowly withdrawn with careful evaluation between folds. Retoflexion in the rectum was completed. Findings/Impression: ANUS: Anal exam reveals no masses or hemorrhoids, sphincter tone is normal.   RECTUM: Rectal exam reveals no masses or hemorrhoids. SIGMOID COLON: The mucosa is normal with good vascular pattern and without ulcers, diverticula, and polyps. DESCENDING COLON: The mucosa is normal with good vascular pattern and without ulcers, diverticula, and polyps. SPLENIC FLEXURE: The splenic flexure is normal.   TRANSVERSE COLON: The mucosa is normal with good vascular pattern and without ulcers, diverticula, and polyps. HEPATIC FLEXURE: The hepatic flexure is normal.   ASCENDING COLON: The mucosa is normal with good vascular pattern and without ulcers, diverticula, and polyps.    CECUM: The appendiceal orifice appears normal. The ileocecal valve appears normal.   TERMINAL ILEUM: The terminal ileum was not entered. Specimen Removed:  none    Complications: None. EBL:  None. Recommendations: --For colon cancer screening in this average-risk patient, colonoscopy may be repeated in 10 years. Regular diet. Resume normal medication(s). Discharge Disposition:  Home in the company of a  when able to ambulate.

## 2017-05-04 ENCOUNTER — APPOINTMENT (OUTPATIENT)
Dept: INTERNAL MEDICINE CLINIC | Age: 52
End: 2017-05-04

## 2017-05-04 DIAGNOSIS — Z79.899 HIGH RISK MEDICATION USE: ICD-10-CM

## 2017-05-04 DIAGNOSIS — Z11.59 NEED FOR HEPATITIS C SCREENING TEST: ICD-10-CM

## 2017-05-04 DIAGNOSIS — E78.00 HYPERCHOLESTEROLEMIA WITHOUT HYPERTRIGLYCERIDEMIA: ICD-10-CM

## 2017-05-05 LAB
ALBUMIN SERPL-MCNC: 4.6 G/DL (ref 3.5–5.5)
ALBUMIN/GLOB SERPL: 2.1 {RATIO} (ref 1.2–2.2)
ALP SERPL-CCNC: 49 IU/L (ref 39–117)
ALT SERPL-CCNC: 18 IU/L (ref 0–44)
AST SERPL-CCNC: 19 IU/L (ref 0–40)
BASOPHILS # BLD AUTO: 0 X10E3/UL (ref 0–0.2)
BASOPHILS NFR BLD AUTO: 0 %
BILIRUB SERPL-MCNC: 0.8 MG/DL (ref 0–1.2)
BUN SERPL-MCNC: 13 MG/DL (ref 6–24)
BUN/CREAT SERPL: 11 (ref 9–20)
CALCIUM SERPL-MCNC: 9.4 MG/DL (ref 8.7–10.2)
CHLORIDE SERPL-SCNC: 99 MMOL/L (ref 96–106)
CHOLEST SERPL-MCNC: 176 MG/DL (ref 100–199)
CO2 SERPL-SCNC: 22 MMOL/L (ref 18–29)
CREAT SERPL-MCNC: 1.16 MG/DL (ref 0.76–1.27)
EOSINOPHIL # BLD AUTO: 0.4 X10E3/UL (ref 0–0.4)
EOSINOPHIL NFR BLD AUTO: 6 %
ERYTHROCYTE [DISTWIDTH] IN BLOOD BY AUTOMATED COUNT: 13.4 % (ref 12.3–15.4)
GLOBULIN SER CALC-MCNC: 2.2 G/DL (ref 1.5–4.5)
GLUCOSE SERPL-MCNC: 94 MG/DL (ref 65–99)
HCT VFR BLD AUTO: 46.6 % (ref 37.5–51)
HCV AB S/CO SERPL IA: <0.1 S/CO RATIO (ref 0–0.9)
HDLC SERPL-MCNC: 67 MG/DL
HGB BLD-MCNC: 15.7 G/DL (ref 12.6–17.7)
IMM GRANULOCYTES # BLD: 0 X10E3/UL (ref 0–0.1)
IMM GRANULOCYTES NFR BLD: 0 %
INTERPRETATION, 910389: NORMAL
LDLC SERPL CALC-MCNC: 93 MG/DL (ref 0–99)
LYMPHOCYTES # BLD AUTO: 1.3 X10E3/UL (ref 0.7–3.1)
LYMPHOCYTES NFR BLD AUTO: 23 %
MCH RBC QN AUTO: 31.1 PG (ref 26.6–33)
MCHC RBC AUTO-ENTMCNC: 33.7 G/DL (ref 31.5–35.7)
MCV RBC AUTO: 92 FL (ref 79–97)
MONOCYTES # BLD AUTO: 0.5 X10E3/UL (ref 0.1–0.9)
MONOCYTES NFR BLD AUTO: 9 %
NEUTROPHILS # BLD AUTO: 3.5 X10E3/UL (ref 1.4–7)
NEUTROPHILS NFR BLD AUTO: 62 %
PLATELET # BLD AUTO: 221 X10E3/UL (ref 150–379)
POTASSIUM SERPL-SCNC: 4.5 MMOL/L (ref 3.5–5.2)
PROT SERPL-MCNC: 6.8 G/DL (ref 6–8.5)
RBC # BLD AUTO: 5.05 X10E6/UL (ref 4.14–5.8)
SODIUM SERPL-SCNC: 140 MMOL/L (ref 134–144)
TRIGL SERPL-MCNC: 80 MG/DL (ref 0–149)
TSH SERPL DL<=0.005 MIU/L-ACNC: 1.66 UIU/ML (ref 0.45–4.5)
VLDLC SERPL CALC-MCNC: 16 MG/DL (ref 5–40)
WBC # BLD AUTO: 5.6 X10E3/UL (ref 3.4–10.8)

## 2017-05-22 ENCOUNTER — OFFICE VISIT (OUTPATIENT)
Dept: CARDIOLOGY CLINIC | Age: 52
End: 2017-05-22

## 2017-05-22 VITALS
BODY MASS INDEX: 31 KG/M2 | HEIGHT: 71 IN | HEART RATE: 63 BPM | SYSTOLIC BLOOD PRESSURE: 120 MMHG | WEIGHT: 221.4 LBS | DIASTOLIC BLOOD PRESSURE: 72 MMHG

## 2017-05-22 DIAGNOSIS — I47.29 PAROXYSMAL VT: Primary | ICD-10-CM

## 2017-05-22 DIAGNOSIS — I48.0 PAROXYSMAL ATRIAL FIBRILLATION (HCC): ICD-10-CM

## 2017-05-22 DIAGNOSIS — Q20.5 CORRECTED TRANSPOSITION OF GREAT VESSELS: ICD-10-CM

## 2017-05-22 DIAGNOSIS — Z86.79 H/O COMPLETE ATRIOVENTRICULAR BLOCK: ICD-10-CM

## 2017-05-22 DIAGNOSIS — I49.5 SINOATRIAL NODE DYSFUNCTION (HCC): ICD-10-CM

## 2017-05-22 DIAGNOSIS — Z95.810 ICD (IMPLANTABLE CARDIOVERTER-DEFIBRILLATOR) IN PLACE: ICD-10-CM

## 2017-05-22 RX ORDER — FLECAINIDE ACETATE 100 MG/1
100 TABLET ORAL 2 TIMES DAILY
Qty: 60 TAB | Refills: 12 | Status: SHIPPED | OUTPATIENT
Start: 2017-05-22 | End: 2018-08-23 | Stop reason: SDUPTHER

## 2017-05-22 RX ORDER — FLECAINIDE ACETATE 100 MG/1
100 TABLET ORAL 2 TIMES DAILY
Qty: 60 TAB | Refills: 12 | Status: SHIPPED | OUTPATIENT
Start: 2017-05-22 | End: 2017-05-22 | Stop reason: SDUPTHER

## 2017-05-22 NOTE — MR AVS SNAPSHOT
Visit Information Date & Time Provider Department Dept. Phone Encounter #  
 5/22/2017  3:20 PM Solomon Garcia MD [facility] Rachael Lomas [phone] [id number] Follow-up Instructions Return in about 6 months (around 11/22/2017). Your Appointments 12/13/2017  3:15 PM  
PACEMAKER with PACEMAKER, STFRANCES  
[facility] (GINGER SCHEDULING) Appt Note: ariela sci icd./stf/new lat schedule 354 [facility] [address] 600 1007 [facility]  
54 [facility] [address] [address] Upcoming Health Maintenance Date Due INFLUENZA AGE 9 TO ADULT 8/1/2017 DTaP/Tdap/Td series (2 - Td) 12/10/2022 COLONOSCOPY 4/21/2027 Allergies as of 5/22/2017  Review Complete On: 5/22/2017 By: Solomon Garcia MD  
 No Known Allergies Current Immunizations  Reviewed on 4/4/2017 Name Date H1N1 FLU VACCINE 11/10/2009 Influenza Vaccine 10/6/2016, 10/5/2014 Influenza Vaccine Whole 10/6/2012 Pneumococcal Polysaccharide (PPSV-23) 12/11/2015 Pneumococcal Vaccine (Unspecified Type) 9/1/2006 TD Vaccine 8/1/2004 Tdap 12/10/2012 Not reviewed this visit You Were Diagnosed With   
  
 Codes Comments Paroxysmal VT ([facility] [address]    -  Primary ICD-10-CM: I47.2 ICD-9-CM: 427.1 Corrected transposition of great vessels     ICD-10-CM: Q20.5 ICD-9-CM: 745.12 Sinoatrial node dysfunction (HCC)     ICD-10-CM: I49.5 ICD-9-CM: 427.81 Paroxysmal atrial fibrillation (HCC)     ICD-10-CM: I48.0 ICD-9-CM: 427.31   
 H/O complete atrioventricular block     ICD-10-CM: Z86.79 
ICD-9-CM: V12.59   
 ICD (implantable cardioverter-defibrillator) in place     ICD-10-CM: Z95.810 ICD-9-CM: V45.02 Vitals BP Pulse Height(growth percentile) Weight(growth percentile) BMI Smoking Status  120/72 (BP 1 Location: Left arm, BP Patient Position: Sitting) 63 5' 11\" (1.803 m) 221 lb 6.4 oz (100.4 kg) 30.88 kg/m2 Never Smoker Vitals History BMI and BSA Data Body Mass Index Body Surface Area  
 30.88 kg/m 2 2.24 m 2 Preferred Pharmacy Pharmacy Name Phone [facility] [address] [phone] Your Updated Medication List  
  
   
This list is accurate as of: 5/22/17  4:10 PM.  Always use your most recent med list.  
  
  
  
  
 albuterol 90 mcg/actuation inhaler Commonly known as:  VENTOLIN HFA INHALE TWO PUFFS BY MOUTH EVERY 4 HOURS AS NEEDED FOR  WHEEZING  
  
 ALPRAZolam 0.5 mg tablet Commonly known as:   Beck Take 1 Tab by mouth two (2) times daily as needed for Anxiety. Max Daily Amount: 1 mg.  
  
 aspirin 325 mg tablet Commonly known as:  ASPIRIN Take 1 Tab by mouth daily. atorvastatin 40 mg tablet Commonly known as:  LIPITOR  
TAKE ONE TABLET BY MOUTH DAILY. CLARITIN 10 mg tablet Generic drug:  loratadine Take 10 mg by mouth.  
  
 enalapril 5 mg tablet Commonly known as:  Ole Frank Take 1 Tab by mouth two (2) times a day. flecainide 100 mg tablet Commonly known as:  TAMBOCOR Take 1 Tab by mouth two (2) times a day. metoprolol succinate 50 mg XL tablet Commonly known as:  TOPROL-XL  
TAKE ONE TABLET BY MOUTH ONCE DAILY  
  
 mometasone-formoterol 200-5 mcg/actuation HFA inhaler Commonly known as:  Arch Balling Take 2 Puffs by inhalation two (2) times a day. Prescriptions Printed Refills  
 flecainide (TAMBOCOR) 100 mg tablet 12 Sig: Take 1 Tab by mouth two (2) times a day. Class: Print Route: Oral  
  
Follow-up Instructions Return in about 6 months (around 11/22/2017). Patient Instructions You will need to follow up in clinic with Dr. Markell Guadalupe and an echo in 6 months. Stop Amiodarone and wait 2 weeks then start Flecainide Introducing [facility] & HEALTH SERVICES! Dear Tammie Medellin: Thank you for requesting a Simple-[employer] account. Our records indicate that you already have an active Simple-Fill account. You can access your account anytime at [email or web address] Did you know that you can access your hospital and ER discharge instructions at any time in Simple-Fill? You can also review all of your test results from your hospital stay or ER visit. Additional Information If you have questions, please visit the Frequently Asked Questions section of the Simple-Fill website at [email or web address] Remember, Simple-Fill is NOT to be used for urgent needs. For medical emergencies, dial 911. Now available from your iPhone and Android! Please provide this summary of care documentation to your next provider. Your primary care clinician is listed as Mario Diaz. If you have any questions after today's visit, please call [phone].

## 2017-05-22 NOTE — PATIENT INSTRUCTIONS
You will need to follow up in clinic with Dr. Gayla Nieto and an echo in 6 months.     Stop Amiodarone and wait 2 weeks then start Flecainide

## 2017-05-22 NOTE — PROGRESS NOTES
Cardiac Electrophysiology Office  Note     Subjective:      Nisreen Rosenbaum is a 46 y.o. 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 The patient's last ICD check last year with Dr Milagro Ruth and demonstrated normal ICD function with NSVT   He had seen me before after Dr Serena Adam retired but due to location he saw Dr Milagro Ruth and now wants to transfer back to [facility] office with me  He was on flecainide 100 mg bid with Dr Serena Adam but in 2015 I noted on echo LVEF 45% so I changed to amiodarone 200 mg every day and he is still on but he felt neuro and vision side effects and when seeing Dr Milagro Ruth he recommended tikosyn but patient did not want to be admitted and he questioned if this can control his VT so he did not want to do that    Hx of abnormal nuclear stress 2011 with cardiac cath 9/2011 that showed normal coronary arteries. Last echo 2012 Normal left (pulmonary) ventricular chamber size and systolic function. A linear echodensity in noted consistent with pacemaker or ICD lead. RV Normal right (systemic) ventricular chamber size with mild right ventricular systolic dysfunction (EF 91%). There is dense apical severe hypokinesis to akinesis with apical hypertrophy as well. Doppler examination of the tricuspid inflow is consistent with impaired right ventricular relaxation. Left atrium: The atrium was mildly to moderately dilated. MV: right-sided AV valve between the left ventricle and right atrium. There are no hemodynamically significant flow abnormalities noted. Mild AR   TV:left-sided AV valve between the right ventricle and left atrium. Mild tricuspid regurgitation. Echo 3/3/15 showed thickened RV apex with severe hypokinesis and EF 45%. This is the functioning systemic ventricle because of the transposition of great vessels. This finding is similar to previous echo. MR TR and AR mild    Social Hx: He denies tobacco use, but he does work as a [employer] ICD implanted 10/20/11  Echo showed thickened RV apex with severe hypokinesis and EF 45%. This is the functioning systemic ventricle because of the transposition of great vessels. This finding is similar to previous echo. MR TR and AR mild  100% RV pacing, VT 7/2/2015 ATP for VT     Patient Active Problem List    Diagnosis Date Noted    Moderate persistent asthma without complication 05/75/0470    ICD (implantable cardioverter-defibrillator) in place 08/12/2015    Paroxysmal VT ([facility] [address] 09/13/2011    Paroxsymal atrial fibrillation 09/30/2010    H/O complete atrioventricular block     Sinoatrial node dysfunction ([facility] [address]     Hypercholesterolemia without hypertriglyceridemia 08/25/2010    Hypertension, essential, benign 08/25/2010    History of colonoscopy with polypectomy 08/25/2010    Corrected transposition of great vessels 08/25/2010     Current Outpatient Prescriptions   Medication Sig Dispense Refill    flecainide (TAMBOCOR) 100 mg tablet Take 1 Tab by mouth two (2) times a day. 60 Tab 12    loratadine (CLARITIN) 10 mg tablet Take 10 mg by mouth.  aspirin (ASPIRIN) 325 mg tablet Take 1 Tab by mouth daily. 90 Tab 3    ALPRAZolam (XANAX) 0.5 mg tablet Take 1 Tab by mouth two (2) times daily as needed for Anxiety. Max Daily Amount: 1 mg. 20 Tab 0    atorvastatin (LIPITOR) 40 mg tablet TAKE ONE TABLET BY MOUTH DAILY. (Patient taking differently: every evening. TAKE ONE TABLET BY MOUTH DAILY. ) 30 Tab 11    albuterol (VENTOLIN HFA) 90 mcg/actuation inhaler INHALE TWO PUFFS BY MOUTH EVERY 4 HOURS AS NEEDED FOR  WHEEZING 1 Inhaler 2    mometasone-formoterol (DULERA) 200-5 mcg/actuation HFA inhaler Take 2 Puffs by inhalation two (2) times a day.  (Patient taking differently: Take 2 Puffs by inhalation as needed.) 1 Inhaler 11    metoprolol succinate (TOPROL-XL) 50 mg XL tablet TAKE ONE TABLET BY MOUTH ONCE DAILY 30 Tab 6    enalapril (VASOTEC) 5 mg tablet Take 1 Tab by mouth two (2) times a day. 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  Pacemaker      Past Surgical History:   Procedure Laterality Date    CARDIAC CATHETERIZATION  9/13/2011    Hemodynamics - RA 5, RV 43/12 LVEDP12, RV sat 81% FA 98%, systemic ventricular function diminished, normal coronaries    COLONOSCOPY N/A 4/21/2017    COL[facility] at [facility]  4/21/2017         HX APPENDECTOMY      HX HEART CATHETERIZATION      HX PACEMAKER  6/2008     [facility] [employer] # [id number]. Family History   Problem Relation Age of Onset    High Cholesterol Mother     COPD Mother     Diabetes Father     COPD Father     No Known Problems Sister     Heart Disease Other      Social History   Substance Use Topics    Smoking status: Never Smoker    Smokeless tobacco: Former User    Alcohol use 18.0 oz/week     12 Cans of beer, 18 Standard drinks or equivalent per week      Comment: 1-12 beers per week        Review of Systems:   Constitutional: Negative for fever, chills, weight loss, malaise/fatigue. HEENT: Negative for nosebleeds, vision changes.    Respiratory: No cough, hemoptysis, sputum production, and  wheezing. + PND  Cardiovascular: Negative for chest pain, palpitations, orthopnea, claudication, leg swelling, syncope, and PND. Gastrointestinal: Negative for nausea, vomiting, diarrhea, constipation, blood in stool and melena. Genitourinary: Negative for dysuria, and hematuria. Musculoskeletal: Negative for myalgias, arthralgia. Skin: Negative for rash. Heme: Does not bleed or bruise easily. Neurological: Negative for speech change and focal weakness     Objective:     Visit Vitals    /72 (BP 1 Location: Left arm, BP Patient Position: Sitting)    Pulse 63    Ht 5' 11\" (1.803 m)    Wt 221 lb 6.4 oz (100.4 kg)    BMI 30.88 kg/m2      Physical Exam:   Constitutional: well-developed and well-nourished. No distress. Head: Normocephalic and atraumatic. Eyes: Pupils are equal, round  Neck: supple. No JVD present. Cardiovascular: Normal rate, regular rhythm and normal heart sounds. Exam reveals no gallop and no friction rub. No murmur heard. Pulmonary/Chest: Lungs clear to auscultation bilaterally posteriorly. Abdominal: Soft, no tenderness. Musculoskeletal: no edema. Neurological: alert,oriented. Skin: Skin is warm and dry. ICD incision small keloid, unremarkable   Psychiatric: normal mood and affect. Behavior is normal. Judgment and thought content normal.             Assessment/Plan:       ICD-10-CM ICD-9-CM    1. Paroxysmal VT ([address] I47.2 427.1    2. Corrected transposition of great vessels Q20.5 745.12    3. Sinoatrial node dysfunction (HCC) I49.5 427.81    4. Paroxysmal atrial fibrillation (HCC) I48.0 427.31    5. H/O complete atrioventricular block Z86.79 V12.59    6. ICD (implantable cardioverter-defibrillator) in place Z95.810 V45.02      Echo 2015 showed RV function of 45%. No acute s/s of heart failure. Continue toprol dose.    He had mild LV dysfunction and I explained to him the risk with flecainide  He will repeat echocardiogram hold amiodarone and 2 weeks later he may go back to the Flecainide   He is taking aspirin full dose but not anticoagulant, also his choice, he is a persaud  He wants ICD remote to be transferred back to [facility] office     Follow-up Disposition:  Return in about 6 months (around 11/22/2017). Thank you for involving me in this patient's care and please call with further concerns or questions. Tammie Daniels M.D.   Electrophysiology/Cardiology  [address] [place] [address] and Vascular [place]  [facility] 84, [facility] [address] , [address], [facility] [address]  [phone]

## 2017-05-22 NOTE — PROGRESS NOTES
[facility] is a 46 y.o. male  Chief Complaint   Patient presents with    Hypertension    Irregular Heart Beat

## 2017-05-24 ENCOUNTER — TELEPHONE (OUTPATIENT)
Dept: CARDIOLOGY CLINIC | Age: 52
End: 2017-05-24

## 2017-05-24 NOTE — TELEPHONE ENCOUNTER
Roxanne Gil, Pharmacist from [facility] called to confirm whether or not patient is still on Amiodarone. Please call him at [phone].  Thank you

## 2017-05-30 ENCOUNTER — OFFICE VISIT (OUTPATIENT)
Dept: CARDIOLOGY CLINIC | Age: 52
End: 2017-05-30

## 2017-05-30 DIAGNOSIS — Z95.810 PRESENCE OF AUTOMATIC CARDIOVERTER/DEFIBRILLATOR (AICD): Primary | ICD-10-CM

## 2017-07-10 DIAGNOSIS — I48.0 PAROXYSMAL ATRIAL FIBRILLATION (HCC): ICD-10-CM

## 2017-07-10 RX ORDER — METOPROLOL SUCCINATE 50 MG/1
TABLET, EXTENDED RELEASE ORAL
Qty: 30 TAB | Refills: 6 | Status: SHIPPED | OUTPATIENT
Start: 2017-07-10 | End: 2018-01-30 | Stop reason: SDUPTHER

## 2017-07-10 NOTE — TELEPHONE ENCOUNTER
Requested Prescriptions     Signed Prescriptions Disp Refills    metoprolol succinate (TOPROL-XL) 50 mg XL tablet 30 Tab 6     Sig: TAKE ONE TABLET BY MOUTH ONCE DAILY     Authorizing Provider: KONG CARBAJAL     Ordering User: Xuan Argueta     Patient now seeing Dr. Zoltan Welsh not Dr. Dena Butterfield.

## 2017-07-24 ENCOUNTER — TELEPHONE (OUTPATIENT)
Dept: CARDIOLOGY CLINIC | Age: 52
End: 2017-07-24

## 2017-07-24 NOTE — TELEPHONE ENCOUNTER
Patient said his pacemaker was connected to the home phone but now he no longer has a home phone. Is there a way to connect it to the cell phone?

## 2017-07-24 NOTE — TELEPHONE ENCOUNTER
Spoke with patient. He will come in for checks until [facility] starts offering free cell adapters.

## 2017-09-12 ENCOUNTER — CLINICAL SUPPORT (OUTPATIENT)
Dept: CARDIOLOGY CLINIC | Age: 52
End: 2017-09-12

## 2017-09-12 DIAGNOSIS — Z95.810 PRESENCE OF AUTOMATIC CARDIOVERTER/DEFIBRILLATOR (AICD): Primary | ICD-10-CM

## 2017-12-18 ENCOUNTER — TELEPHONE (OUTPATIENT)
Dept: CARDIOLOGY CLINIC | Age: 52
End: 2017-12-18

## 2017-12-18 RX ORDER — ENALAPRIL MALEATE 5 MG/1
5 TABLET ORAL 2 TIMES DAILY
Qty: 180 TAB | Refills: 1 | Status: SHIPPED | OUTPATIENT
Start: 2017-12-18 | End: 2018-06-20 | Stop reason: SDUPTHER

## 2017-12-19 ENCOUNTER — OFFICE VISIT (OUTPATIENT)
Dept: CARDIOLOGY CLINIC | Age: 52
End: 2017-12-19

## 2017-12-19 DIAGNOSIS — Z95.810 PRESENCE OF AUTOMATIC CARDIOVERTER/DEFIBRILLATOR (AICD): Primary | ICD-10-CM

## 2018-01-18 ENCOUNTER — CLINICAL SUPPORT (OUTPATIENT)
Dept: CARDIOLOGY CLINIC | Age: 53
End: 2018-01-18

## 2018-01-18 ENCOUNTER — OFFICE VISIT (OUTPATIENT)
Dept: CARDIOLOGY CLINIC | Age: 53
End: 2018-01-18

## 2018-01-18 VITALS
BODY MASS INDEX: 31.22 KG/M2 | SYSTOLIC BLOOD PRESSURE: 124 MMHG | DIASTOLIC BLOOD PRESSURE: 74 MMHG | WEIGHT: 223 LBS | RESPIRATION RATE: 16 BRPM | HEIGHT: 71 IN | HEART RATE: 69 BPM | OXYGEN SATURATION: 98 %

## 2018-01-18 DIAGNOSIS — Z95.810 PRESENCE OF AUTOMATIC CARDIOVERTER/DEFIBRILLATOR (AICD): Primary | ICD-10-CM

## 2018-01-18 DIAGNOSIS — Z51.81 ENCOUNTER FOR MONITORING FLECAINIDE THERAPY: ICD-10-CM

## 2018-01-18 DIAGNOSIS — I48.0 PAROXYSMAL ATRIAL FIBRILLATION (HCC): ICD-10-CM

## 2018-01-18 DIAGNOSIS — Z79.899 ENCOUNTER FOR MONITORING FLECAINIDE THERAPY: ICD-10-CM

## 2018-01-18 DIAGNOSIS — Z86.79 H/O COMPLETE ATRIOVENTRICULAR BLOCK: ICD-10-CM

## 2018-01-18 DIAGNOSIS — Q20.5 CORRECTED TRANSPOSITION OF GREAT VESSELS: ICD-10-CM

## 2018-01-18 DIAGNOSIS — Z95.810 ICD (IMPLANTABLE CARDIOVERTER-DEFIBRILLATOR) IN PLACE: ICD-10-CM

## 2018-01-18 DIAGNOSIS — I47.29 PAROXYSMAL VT: ICD-10-CM

## 2018-01-18 DIAGNOSIS — Q20.5 L-TGA, LEVO-TRANSPOSITION OF GREAT ARTERIES WITH VENTRICULAR INVERSION: Primary | ICD-10-CM

## 2018-01-18 NOTE — MR AVS SNAPSHOT
[address] [facility] [address] [place] 
[phone] Patient: Stevie Guajardo MRN:  :[id number] Visit Information Date & Time Provider Department Dept. Phone Encounter #  
 2018  4:00 PM Conrad Rodrigues MD [facility] Marialuisa Malin [phone] [id number]  
  
 3/26/2018  8:00 AM  
REMOTE OFFICE VISIT with Matt Taylor [facility] (GINGER SCHEDULING) Appt Note: [facility] icd, rc b 17  
 [address] [place] [address]  
[address] [facility] [address] [facility] [facility] [address] [facility] [address] Upcoming Health Maintenance Date Due DTaP/Tdap/Td series (2 - Td) 12/10/2022 COLONOSCOPY 2027 Allergies as of 2018  Review Complete On: 2018 By: Conrad Rodrigues MD  
 No Known Allergies Current Immunizations  Reviewed on 2017 Name Date H1N1 FLU VACCINE 11/10/2009 Influenza Vaccine 2017, 10/6/2016, 10/5/2014 Influenza Vaccine Whole 10/6/2012 Pneumococcal Polysaccharide (PPSV-23) 2015 TD Vaccine 2004 Tdap 12/10/2012 ZZZ-RETIRED (DO NOT USE) Pneumococcal Vaccine (Unspecified Type) 2006 Not reviewed this visit You Were Diagnosed With   
  
 Codes Comments Paroxysmal atrial fibrillation (HCC)    -  Primary ICD-10-CM: I48.0 ICD-9-CM: 427.31 Vitals BP Pulse Resp Height(growth percentile) Weight(growth percentile) SpO2  
 124/74 (BP 1 Location: Left arm, BP Patient Position: Sitting) 69 16 5' 11\" (1.803 m) 223 lb (101.2 kg) 98% BMI Smoking Status 31.1 kg/m2 Never Smoker BMI and BSA Data Body Mass Index Body Surface Area  
 31.1 kg/m 2 2.25 m 2 Preferred Pharmacy Pharmacy Name Phone [address] [place] [address] [phone] Your Updated Medication List  
  
   
 This list is accurate as of: 1/18/18  4:52 PM.  Always use your most recent med list.  
  
  
  
  
 albuterol 90 mcg/actuation inhaler Commonly known as:  VENTOLIN HFA INHALE TWO PUFFS BY MOUTH EVERY 4 HOURS AS NEEDED FOR  WHEEZING  
  
 ALPRAZolam 0.5 mg tablet Commonly known as:  Soraya Menominee Take 1 Tab by mouth two (2) times daily as needed for Anxiety. Max Daily Amount: 1 mg.  
  
 aspirin 325 mg tablet Commonly known as:  ASPIRIN Take 1 Tab by mouth daily. atorvastatin 40 mg tablet Commonly known as:  LIPITOR  
TAKE ONE TABLET BY MOUTH DAILY. CLARITIN 10 mg tablet Generic drug:  loratadine Take 10 mg by mouth.  
  
 enalapril 5 mg tablet Commonly known as:  Zebedee Eliecer Take 1 Tab by mouth two (2) times a day. flecainide 100 mg tablet Commonly known as:  TAMBOCOR Take 1 Tab by mouth two (2) times a day. metoprolol succinate 50 mg XL tablet Commonly known as:  TOPROL-XL  
TAKE ONE TABLET BY MOUTH ONCE DAILY  
  
 mometasone-formoterol 200-5 mcg/actuation HFA inhaler Commonly known as:  Stephanie Fort Take 2 Puffs by inhalation two (2) times a day. We Performed the Following AMB POC EKG ROUTINE W/ 12 LEADS, INTER & REP [67478 CPT(R)] Patient Instructions You will need to follow up in clinic with Dr. Samantha Sanders in 12 months. Introducing [facility] & HEALTH SERVICES! Dear Vargas Shafer: Thank you for requesting a [employer] account. Our records indicate that you already have an active D[employer]ogix account. You can access your account anytime at [email or web address] Did you know that you can access your hospital and ER discharge instructions at any time in [employer]? You can also review all of your test results from your hospital stay or ER visit. Additional Information If you have questions, please visit the Frequently Asked Questions section of the [employer] website at [email or web address] Remember, MyChart is NOT to be used for urgent needs. For medical emergencies, dial 911. Now available from your iPhone and Android! Please provide this summary of care documentation to your next provider. Your primary care clinician is listed as Latoya Macias. If you have any questions after today's visit, please call [phone].

## 2018-01-18 NOTE — PROGRESS NOTES
Cardiac Electrophysiology Office Note     Subjective:      Valeria Linares is a 46 y.o. 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 The patient's last ICD check last year with Dr Adelina Trevizo and demonstrated normal ICD function with NSVT   He had seen me before after Dr Justin Kim retired but due to location he saw Dr Adelina Trevizo and now wants to transfer back to [facility] office with me  He was on flecainide 100 mg bid with Dr Justin Kim but in 2015 I noted on echo LVEF 45% so I changed to amiodarone 200 mg every day and he is still on but he felt neuro and vision side effects and when seeing Dr Adelina Trevizo he recommended tikosyn but patient did not want to be admitted and he questioned if this can control his VT so he did not want to do that  He has done well with flecainide and toprol    Hx of abnormal nuclear stress 2011 with cardiac cath 9/2011 that showed normal coronary arteries. echo 2012 Normal left (pulmonary) ventricular chamber size and systolic function. A linear echodensity in noted consistent with pacemaker or ICD lead. RV Normal right (systemic) ventricular chamber size with mild right ventricular systolic dysfunction (EF 74%). There is dense apical severe hypokinesis to akinesis with apical hypertrophy as well. Doppler examination of the tricuspid inflow is consistent with impaired right ventricular relaxation. Left atrium: The atrium was mildly to moderately dilated. MV: right-sided AV valve between the left ventricle and right atrium. There are no hemodynamically significant flow abnormalities noted. Mild AR   TV:left-sided AV valve between the right ventricle and left atrium. Mild tricuspid regurgitation. Echo 3/3/15 showed thickened RV apex with severe hypokinesis and EF 45%.   This is the functioning systemic ventricle because of the transposition of great vessels. This finding is similar to previous echo. 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        Fenton Mapidy ICD implanted 10/20/11 upgraded from pacer, RA lead in 2005    VT 7/2/2015 ATP for VT     Patient Active Problem List    Diagnosis Date Noted    Moderate persistent asthma without complication 74/53/2792    ICD (implantable cardioverter-defibrillator) in place 08/12/2015    Paroxysmal VT ([facility] 09/13/2011    Paroxsymal atrial fibrillation 09/30/2010    H/O complete atrioventricular block     Sinoatrial node dysfunction ([facility]     Hypercholesterolemia without hypertriglyceridemia 08/25/2010    Hypertension, essential, benign 08/25/2010    History of colonoscopy with polypectomy 08/25/2010    Corrected transposition of great vessels 08/25/2010     Current Outpatient Prescriptions   Medication Sig Dispense Refill    enalapril (VASOTEC) 5 mg tablet Take 1 Tab by mouth two (2) times a day. 180 Tab 1    metoprolol succinate (TOPROL-XL) 50 mg XL tablet TAKE ONE TABLET BY MOUTH ONCE DAILY 30 Tab 6    flecainide (TAMBOCOR) 100 mg tablet Take 1 Tab by mouth two (2) times a day. 60 Tab 12    loratadine (CLARITIN) 10 mg tablet Take 10 mg by mouth.  aspirin (ASPIRIN) 325 mg tablet Take 1 Tab by mouth daily. 90 Tab 3    ALPRAZolam (XANAX) 0.5 mg tablet Take 1 Tab by mouth two (2) times daily as needed for Anxiety. Max Daily Amount: 1 mg. 20 Tab 0    atorvastatin (LIPITOR) 40 mg tablet TAKE ONE TABLET BY MOUTH DAILY. (Patient taking differently: every evening. TAKE ONE TABLET BY MOUTH DAILY. ) 30 Tab 11    albuterol (VENTOLIN HFA) 90 mcg/actuation inhaler INHALE TWO PUFFS BY MOUTH EVERY 4 HOURS AS NEEDED FOR  WHEEZING 1 Inhaler 2    mometasone-formoterol (DULERA) 200-5 mcg/actuation HFA inhaler Take 2 Puffs by inhalation two (2) times a day.  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  Pacemaker      Past Surgical History:   Procedure Laterality Date    CARDIAC CATHETERIZATION  9/13/2011    Hemodynamics - RA 5, RV 43/12 LVEDP12, RV sat 81% FA 98%, systemic ventricular function diminished, normal coronaries    COLONOSCOPY N/A 4/21/2017    COLONOSCOPY [facility] by Rehana Oconnell [facility] at [facility]  4/21/2017         HX APPENDECTOMY      HX HEART CATHETERIZATION      HX PACEMAKER  6/2008     [facility] # [id number]. Family History   Problem Relation Age of Onset    High Cholesterol Mother     COPD Mother     Diabetes Father     COPD Father     No Known Problems Sister     Heart Disease Other      Social History   Substance Use Topics    Smoking status: Never Smoker    Smokeless tobacco: Former User    Alcohol use 18.0 oz/week     12 Cans of beer, 18 Standard drinks or equivalent per week      Comment: 1-12 beers per week        Review of Systems:   Constitutional: Negative for fever, chills, weight loss, malaise/fatigue. HEENT: Negative for nosebleeds, vision changes. Respiratory: No cough, hemoptysis, sputum production, and  wheezing. Cardiovascular: Negative for chest pain, palpitations, orthopnea, claudication, leg swelling, syncope, and PND. Gastrointestinal: Negative for nausea, vomiting, diarrhea, constipation, blood in stool and melena.    Genitourinary: Negative for dysuria, and hematuria. Musculoskeletal: Negative for myalgias, arthralgia. Skin: Negative for rash. Heme: Does not bleed or bruise easily. Neurological: Negative for speech change and focal weakness     Objective:     Visit Vitals    /74 (BP 1 Location: Left arm, BP Patient Position: Sitting)    Pulse 69    Resp 16    Ht 5' 11\" (1.803 m)    Wt 223 lb (101.2 kg)    SpO2 98%    BMI 31.1 kg/m2      Physical Exam:   Constitutional: well-developed and well-nourished. No distress. Head: Normocephalic and atraumatic. Eyes: Pupils are equal, round  Neck: supple. No JVD present. Cardiovascular: Normal rate, regular rhythm and 2/6 systolic murmur heard. Pulmonary/Chest: Lungs clear to auscultation bilaterally   Abdominal: Soft, no tenderness. Musculoskeletal: no edema. Neurological: alert, oriented. Skin: Skin is warm and dry. ICD incision small keloid scar, unremarkable site  Psychiatric: normal mood and affect. Behavior is normal. Judgment and thought content normal.      ECG atrial sensing and ventricular pacing    Assessment/Plan:       ICD-10-CM ICD-9-CM    1. Presence of automatic cardioverter/defibrillator (AICD) Z95.810 V45.02    2. Paroxysmal atrial fibrillation (HCC) I48.0 427.31 AMB POC EKG ROUTINE W/ 12 LEADS, INTER & REP   3. H/O complete atrioventricular block Z86.79 V12.59    4. ICD (implantable cardioverter-defibrillator) in place Z95.810 V45.02    5. Corrected transposition of great vessels Q20.5 745.12    6. Paroxysmal VT ([address] I47.2 427.1    7. Encounter for monitoring flecainide therapy Z51.81 V58.83     Z79.899 V58.69      Echo 2015 showed RV function of 45%. Continue toprol dose and enalapril.    He had mild LV dysfunction and I explained to him the risk with flecainide  He will repeat echocardiogram    He is taking aspirin full dose    he is a persaud  He wants ICD remote check and one year follow up     Follow-up Disposition:  Return in about 1 year (around 1/18/2019). Thank you for involving me in this patient's care and please call with further concerns or questions. Ambar Martinez M.D.   Electrophysiology/Cardiology  [facility] [place]  [facility] [address], [facility] [address] [place] [address]  [phone]

## 2018-01-19 ENCOUNTER — TELEPHONE (OUTPATIENT)
Dept: CARDIOLOGY CLINIC | Age: 53
End: 2018-01-19

## 2018-01-19 NOTE — TELEPHONE ENCOUNTER
----- Message from Arin Michelle MD sent at 1/19/2018  7:14 AM EST -----  EF remains stable  Continue with medications  No BIV pacer ICD upgrade for now

## 2018-01-26 ENCOUNTER — APPOINTMENT (OUTPATIENT)
Dept: GENERAL RADIOLOGY | Age: 53
End: 2018-01-26
Attending: EMERGENCY MEDICINE
Payer: COMMERCIAL

## 2018-01-26 ENCOUNTER — HOSPITAL ENCOUNTER (EMERGENCY)
Age: 53
Discharge: HOME | End: 2018-01-26
Attending: EMERGENCY MEDICINE
Payer: COMMERCIAL

## 2018-01-26 ENCOUNTER — HOSPITAL ENCOUNTER (EMERGENCY)
Age: 53
Discharge: OTHER INSTITUTION | End: 2018-01-26
Attending: FAMILY MEDICINE

## 2018-01-26 VITALS
OXYGEN SATURATION: 99 % | SYSTOLIC BLOOD PRESSURE: 129 MMHG | TEMPERATURE: 99.2 F | HEART RATE: 50 BPM | HEIGHT: 71 IN | WEIGHT: 223 LBS | DIASTOLIC BLOOD PRESSURE: 59 MMHG | BODY MASS INDEX: 31.22 KG/M2 | RESPIRATION RATE: 20 BRPM

## 2018-01-26 VITALS
SYSTOLIC BLOOD PRESSURE: 125 MMHG | RESPIRATION RATE: 15 BRPM | BODY MASS INDEX: 31.36 KG/M2 | HEIGHT: 71 IN | WEIGHT: 223.99 LBS | DIASTOLIC BLOOD PRESSURE: 48 MMHG | TEMPERATURE: 97.5 F | OXYGEN SATURATION: 95 % | HEART RATE: 65 BPM

## 2018-01-26 DIAGNOSIS — Z98.890 HISTORY OF RECENT DENTAL PROCEDURE: ICD-10-CM

## 2018-01-26 DIAGNOSIS — R21 RASH AND OTHER NONSPECIFIC SKIN ERUPTION: Primary | ICD-10-CM

## 2018-01-26 DIAGNOSIS — B34.9 VIRAL SYNDROME: ICD-10-CM

## 2018-01-26 DIAGNOSIS — L51.9 ERYTHEMA MULTIFORME: Primary | ICD-10-CM

## 2018-01-26 DIAGNOSIS — R00.1 BRADYCARDIA: ICD-10-CM

## 2018-01-26 DIAGNOSIS — R50.9 FEVER, UNSPECIFIED FEVER CAUSE: ICD-10-CM

## 2018-01-26 LAB
ALBUMIN SERPL-MCNC: 4 G/DL (ref 3.5–5)
ALBUMIN/GLOB SERPL: 1.2 {RATIO} (ref 1.1–2.2)
ALP SERPL-CCNC: 61 U/L (ref 45–117)
ALT SERPL-CCNC: 29 U/L (ref 12–78)
ANION GAP SERPL CALC-SCNC: 5 MMOL/L (ref 5–15)
AST SERPL-CCNC: 17 U/L (ref 15–37)
BASOPHILS # BLD: 0 K/UL (ref 0–0.1)
BASOPHILS NFR BLD: 0 % (ref 0–1)
BILIRUB SERPL-MCNC: 1.5 MG/DL (ref 0.2–1)
BUN SERPL-MCNC: 13 MG/DL (ref 6–20)
BUN/CREAT SERPL: 11 (ref 12–20)
CALCIUM SERPL-MCNC: 9 MG/DL (ref 8.5–10.1)
CHLORIDE SERPL-SCNC: 103 MMOL/L (ref 97–108)
CO2 SERPL-SCNC: 28 MMOL/L (ref 21–32)
CREAT SERPL-MCNC: 1.23 MG/DL (ref 0.7–1.3)
DEPRECATED S PYO AG THROAT QL EIA: NEGATIVE
DIFFERENTIAL METHOD BLD: ABNORMAL
EOSINOPHIL # BLD: 0.1 K/UL (ref 0–0.4)
EOSINOPHIL NFR BLD: 1 % (ref 0–7)
ERYTHROCYTE [DISTWIDTH] IN BLOOD BY AUTOMATED COUNT: 12.1 % (ref 11.5–14.5)
ERYTHROCYTE [SEDIMENTATION RATE] IN BLOOD: 7 MM/HR (ref 0–20)
GLOBULIN SER CALC-MCNC: 3.4 G/DL (ref 2–4)
GLUCOSE SERPL-MCNC: 108 MG/DL (ref 65–100)
HCT VFR BLD AUTO: 45.7 % (ref 36.6–50.3)
HGB BLD-MCNC: 15.8 G/DL (ref 12.1–17)
IMM GRANULOCYTES # BLD: 0.1 K/UL (ref 0–0.04)
IMM GRANULOCYTES NFR BLD AUTO: 1 % (ref 0–0.5)
INFLUENZA A AG (POC): NORMAL
INFLUENZA AG (POC) NEGATIVE CTRL.: NORMAL
INFLUENZA AG (POC) POSITIVE CTRL.: NORMAL
INFLUENZA B AG (POC): NORMAL
LOT NUMBER POC: NORMAL
LYMPHOCYTES # BLD: 0.9 K/UL (ref 0.8–3.5)
LYMPHOCYTES NFR BLD: 7 % (ref 12–49)
MCH RBC QN AUTO: 31.3 PG (ref 26–34)
MCHC RBC AUTO-ENTMCNC: 34.6 G/DL (ref 30–36.5)
MCV RBC AUTO: 90.5 FL (ref 80–99)
MONOCYTES # BLD: 0.6 K/UL (ref 0–1)
MONOCYTES NFR BLD: 5 % (ref 5–13)
NEUTS SEG # BLD: 11.3 K/UL (ref 1.8–8)
NEUTS SEG NFR BLD: 87 % (ref 32–75)
NRBC # BLD: 0 K/UL (ref 0–0.01)
NRBC BLD-RTO: 0 PER 100 WBC
PLATELET # BLD AUTO: 221 K/UL (ref 150–400)
PMV BLD AUTO: 10.4 FL (ref 8.9–12.9)
POTASSIUM SERPL-SCNC: 4.1 MMOL/L (ref 3.5–5.1)
PROT SERPL-MCNC: 7.4 G/DL (ref 6.4–8.2)
RBC # BLD AUTO: 5.05 M/UL (ref 4.1–5.7)
SODIUM SERPL-SCNC: 136 MMOL/L (ref 136–145)
WBC # BLD AUTO: 13 K/UL (ref 4.1–11.1)

## 2018-01-26 PROCEDURE — 71046 X-RAY EXAM CHEST 2 VIEWS: CPT

## 2018-01-26 PROCEDURE — 85652 RBC SED RATE AUTOMATED: CPT | Performed by: EMERGENCY MEDICINE

## 2018-01-26 PROCEDURE — 85025 COMPLETE CBC W/AUTO DIFF WBC: CPT | Performed by: EMERGENCY MEDICINE

## 2018-01-26 PROCEDURE — 99282 EMERGENCY DEPT VISIT SF MDM: CPT

## 2018-01-26 PROCEDURE — 80053 COMPREHEN METABOLIC PANEL: CPT | Performed by: EMERGENCY MEDICINE

## 2018-01-26 PROCEDURE — 74011636637 HC RX REV CODE- 636/637: Performed by: EMERGENCY MEDICINE

## 2018-01-26 PROCEDURE — 36415 COLL VENOUS BLD VENIPUNCTURE: CPT | Performed by: EMERGENCY MEDICINE

## 2018-01-26 PROCEDURE — 77030029684 HC NEB SM VOL KT MONA -A

## 2018-01-26 PROCEDURE — 87070 CULTURE OTHR SPECIMN AEROBIC: CPT | Performed by: EMERGENCY MEDICINE

## 2018-01-26 PROCEDURE — 87880 STREP A ASSAY W/OPTIC: CPT | Performed by: EMERGENCY MEDICINE

## 2018-01-26 RX ORDER — PREDNISONE 10 MG/1
TABLET ORAL
Qty: 42 TAB | Refills: 0 | Status: SHIPPED | OUTPATIENT
Start: 2018-01-26 | End: 2018-08-23 | Stop reason: ALTCHOICE

## 2018-01-26 RX ORDER — PREDNISONE 20 MG/1
60 TABLET ORAL ONCE
Status: COMPLETED | OUTPATIENT
Start: 2018-01-26 | End: 2018-01-26

## 2018-01-26 RX ADMIN — PREDNISONE 60 MG: 20 TABLET ORAL at 18:25

## 2018-01-26 NOTE — UC PROVIDER NOTE
HPI Comments:   Onset of heartburn last night. Woke up with rash palms, wrists. Have progressively gotten worse throughout the day. Had another episode of heartburn at 10 am.  Rash now on head, waist, torso, lower extremities, gluteal crease. They are predominately painful/irritating. Associated fever of 100.7 F toady at home. Has not taken any medications. Overall progressively worsening. No history of any similar rashes.    + Dental procedure several weeks ago: root canal  No known tick bites. + slept in his camper 1 week ago; does not remember bites. Hx significant for: congenitally corrected TGA, implatable ICD, asthma, HTN, GERD, Afib    Patient is a 46 y.o. male presenting with rash. 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  Pacemaker         Past Surgical History:   Procedure Laterality Date    CARDIAC CATHETERIZATION  9/13/2011    Hemodynamics - RA 5, RV 43/12 LVEDP12, RV sat 81% FA 98%, systemic ventricular function diminished, normal coronaries    COLONOSCOPY N/A 4/21/2017    COLONOSCOPY performed by Danyel Luo MD at [facility]  4/21/2017         HX APPENDECTOMY      HX HEART CATHETERIZATION      HX PACEMAKER  6/2008     [facility] # [id number].          Family History   Problem Relation Age of Onset    High Cholesterol Mother     COPD Mother     Diabetes Father     COPD Father     No Known Problems Sister     Heart Disease Other Social History     Social History    Marital status:      Spouse name: N/A    Number of children: N/A    Years of education: N/A     Occupational History    Not on file. Social History Main Topics    Smoking status: Never Smoker    Smokeless tobacco: Former User    Alcohol use 18.0 oz/week     12 Cans of beer, 18 Standard drinks or equivalent per week      Comment: 1-12 beers per week    Drug use: No    Sexual activity: Yes     Partners: Female     Other Topics Concern    Not on file     Social History Narrative                ALLERGIES: Review of patient's allergies indicates no known allergies. Review of Systems   Constitutional: Positive for chills and fever. HENT: Negative for rhinorrhea and sore throat. Eyes: Negative for visual disturbance. Respiratory: Negative for cough, chest tightness and shortness of breath. Gastrointestinal: Negative for nausea and vomiting. Musculoskeletal: Negative for back pain, myalgias and neck stiffness. Skin: Positive for rash. Neurological: Negative for dizziness, weakness, light-headedness and headaches. Vitals:    01/26/18 1338   BP: 129/59   Pulse: (!) 50   Resp: 20   Temp: 99.2 °F (37.3 °C)   SpO2: 99%   Weight: 101.2 kg (223 lb)   Height: 5' 11\" (1.803 m)       Physical Exam   Constitutional: He is oriented to person, place, and time. No distress. Non toxic appearing   HENT:   Head: Normocephalic and atraumatic. Nose: Nose normal.   Mouth/Throat: Oropharynx is clear and moist. No oropharyngeal exudate. OP clear. Mucus membranes moist. TMs normal appearing   Eyes: Conjunctivae and EOM are normal. Pupils are equal, round, and reactive to light. Neck: Normal range of motion. Neck supple. No thyromegaly present. Cardiovascular: Regular rhythm and normal heart sounds. Exam reveals no gallop and no friction rub. No murmur heard. Pulmonary/Chest: Effort normal and breath sounds normal. No respiratory distress.  He has no wheezes. He has no rales. Musculoskeletal: He exhibits no edema. Lymphadenopathy:     He has no cervical adenopathy. Neurological: He is alert and oriented to person, place, and time. No cranial nerve deficit. Coordination normal.   Skin: No petechiae noted. Rash is not pustular. He is not diaphoretic. No pallor. Multiple mildly erythematous tender papular to nodular lesions scattered various locations body. Multiple on forehead/top of head. Several lesions palms of hands, buttocks, upper chest, inside forearms/wrists. None on mucus membranes. None on bottom of feet. Psychiatric: He has a normal mood and affect. His behavior is normal. Thought content normal.       MDM     Differential Diagnosis; Clinical Impression; Plan:     CLINICAL IMPRESSION:  (R21) Rash and other nonspecific skin eruption  (primary encounter diagnosis)  (R00.1) Bradycardia  (R50.9) Fever, unspecified fever cause  (Z98.890) History of recent dental procedure    Orders Placed This Encounter      POC INFLUENZA A & B SCREEN    Rapid flu negative. Given history of recent dental procedure and extensive cardiac history, concern for possible endocarditis. DDX: coxackie virus, papular urticaria, insect bite, tickborne illness, other viral rash    Go immediately to ED after leaving clinic today ([facility])  Call ahead report given to ED physician. Amount and/or Complexity of Data Reviewed:    Discuss the patient with another provider:  Yes  Risk of Significant Complications, Morbidity, and/or Mortality:   Presenting problems:  High  Diagnostic procedures:   Moderate  Management options:  High  Progress:   Patient progress:  Stable      Procedures

## 2018-01-26 NOTE — DISCHARGE INSTRUCTIONS
Erythema Multiforme: Care Instructions  Your Care Instructions  Erythema multiforme (say \"air--THE-[facility]-FOR-tameka\") is a rash that often causes red spots. These spots can look like targets, with a [place] around the edge and a dot in the middle. In most cases, doctors know the rash when they see it. But sometimes blood tests or testing a tissue sample (biopsy) can confirm what type of rash you have. Or these tests can help rule out other problems. This skin condition is usually found on the hands, feet, arms, or legs. But it can affect any part of the body. Sometimes, the rash itches or burns. Some people have a fever or feel a little sick. Doctors don't always know what causes erythema multiforme. But the rash may be related to a medicine, an infection, or another health problem. So your doctor may have you stop taking a certain medicine or treat you for a different problem. In many cases, the rash goes away on its own in a few weeks. But it can take longer. Some cases may need to be treated with medicines such as steroid medicines. The doctor has checked you carefully, but problems can develop later. If you notice any problems or new symptoms, get medical treatment right away. Follow-up care is a key part of your treatment and safety. Be sure to make and go to all appointments, and call your doctor if you are having problems. It's also a good idea to know your test results and keep a list of the medicines you take. How can you care for yourself at home? · Put a cool, moist cloth on the rash. · Be safe with medicines. Take your medicines exactly as prescribed. Call your doctor if you think you are having a problem with your medicine. When should you call for help? Call your doctor now or seek immediate medical care if:  ? · You have a new rash that affects your eyes, mouth, or genitals. ? · You have a fever or chills. ? Watch closely for changes in your health, and be sure to contact your doctor if:  ? · Your rash is changing or getting worse. ? · You do not get better as expected. Where can you learn more? Go to http://genesis-vicky.info/. Enter R657 in the search box to learn more about \"Erythema Multiforme: Care Instructions. \"  Current as of: October 13, 2016  Content Version: 11.4  © 3837-8158 [employer]. Care instructions adapted under license by [employer] (which disclaims liability or warranty for this information). If you have questions about a medical condition or this instruction, always ask your healthcare professional. [place] [address] any warranty or liability for your use of this information.

## 2018-01-26 NOTE — ED PROVIDER NOTES
EMERGENCY DEPARTMENT HISTORY AND PHYSICAL EXAM      Date: 1/26/2018  Patient Name: Stevie Guajardo    History of Presenting Illness     Chief Complaint   Patient presents with    Referral Follow Up     Ambulatory w/ referral from [address] [place] [address] after patient was seen today for rash on head, groin, hands. Pt was sent here b/c he had a root canal about 3 weeks prior & clinic was concerned for sepsis. History Provided By: Patient    HPI: Stevie Guajardo, 46 y.o. male with PMHx significant for defibrillator, HLD, asthma, anxiety, HTN, GERD, arrhythmia, presents ambulatory to the ED with cc of scattered, painful red-colored rash along his scalp and upper extremities, present x last night. Pt describes his rash as \"painful to touch\". Pt reports associated sx of fever of 100.9 last night, as well as full-body myalgias today. Pt was seen at [facility] today and was referred to the ED to rule out any serious infection. Pt reports a negative flu swab done at [address] [place] [address] today. He reports a recent root canal done 3 weeks ago and finished Abx 8 days ago. He denies any recent sick contact to his knowledge. NKDA. He denies abdominal pain, CP, SOB, cough. Patient denies smoking but does drink alcohol. PCP: Ana Stone MD    There are no other complaints, changes, or physical findings at this time. Current Outpatient Prescriptions   Medication Sig Dispense Refill    predniSONE (DELTASONE) 10 mg tablet Take as follows   Day 1-2: 6 tabs PO -   Day 3-4: 5 tabs PO -   Day 5-6: 4 tabs PO -   Day 7-8: 3 tabs PO -   Day 9-10: 2 tabs PO -   Day 11-12: 1 tab PO    Dispense 42 tabs 42 Tab 0    enalapril (VASOTEC) 5 mg tablet Take 1 Tab by mouth two (2) times a day. 180 Tab 1    metoprolol succinate (TOPROL-XL) 50 mg XL tablet TAKE ONE TABLET BY MOUTH ONCE DAILY 30 Tab 6    flecainide (TAMBOCOR) 100 mg tablet Take 1 Tab by mouth two (2) times a day.  60 Tab 12    loratadine (CLARITIN) 10 mg tablet Take 10 mg by mouth.  aspirin (ASPIRIN) 325 mg tablet Take 1 Tab by mouth daily. 90 Tab 3    ALPRAZolam (XANAX) 0.5 mg tablet Take 1 Tab by mouth two (2) times daily as needed for Anxiety. Max Daily Amount: 1 mg. 20 Tab 0    atorvastatin (LIPITOR) 40 mg tablet TAKE ONE TABLET BY MOUTH DAILY. (Patient taking differently: every evening. TAKE ONE TABLET BY MOUTH DAILY. ) 30 Tab 11    albuterol (VENTOLIN HFA) 90 mcg/actuation inhaler INHALE TWO PUFFS BY MOUTH EVERY 4 HOURS AS NEEDED FOR  WHEEZING 1 Inhaler 2    mometasone-formoterol (DULERA) 200-5 mcg/actuation HFA inhaler Take 2 Puffs by inhalation two (2) times a day. 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  Pacemaker        Past Surgical History:  Past Surgical History:   Procedure Laterality Date    CARDIAC CATHETERIZATION  9/13/2011    Hemodynamics - RA 5, RV 37/13 LVEDP12, RV sat 81% FA 98%, systemic ventricular function diminished, normal coronaries    COLONOSCOPY N/A 4/21/2017    COLONOSCOPY performed by Timoteo Munoz [facility] at [facility]  4/21/2017         HX APPENDECTOMY      HX HEART CATHETERIZATION      HX PACEMAKER  6/2008     [facility] # [id number].        Family History:  Family History   Problem Relation Age of Onset    High Cholesterol Mother     COPD Mother     Diabetes Father  COPD Father     No Known Problems Sister     Heart Disease Other        Social History:  Social History   Substance Use Topics    Smoking status: Never Smoker    Smokeless tobacco: Former User    Alcohol use 18.0 oz/week     12 Cans of beer, 18 Standard drinks or equivalent per week      Comment: 1-12 beers per week       Allergies:  No Known Allergies      Review of Systems   Review of Systems   Constitutional: Positive for fever. Negative for chills. HENT: Negative for congestion and sore throat. Eyes: Negative for visual disturbance. Respiratory: Negative for cough and shortness of breath. Cardiovascular: Negative for chest pain and leg swelling. Gastrointestinal: Negative for abdominal pain, blood in stool, diarrhea and nausea. Endocrine: Negative for polyuria. Genitourinary: Negative for dysuria and testicular pain. Musculoskeletal: Positive for myalgias. Negative for arthralgias and joint swelling. Skin: Positive for rash. Allergic/Immunologic: Negative for immunocompromised state. Neurological: Negative for weakness and headaches. Hematological: Does not bruise/bleed easily. Psychiatric/Behavioral: Negative for confusion. Physical Exam   Physical Exam   Constitutional: He is oriented to person, place, and time. He appears well-developed and well-nourished. HENT:   Head: Normocephalic and atraumatic. Moist mucous membranes   Eyes: Conjunctivae are normal. Pupils are equal, round, and reactive to light. Right eye exhibits no discharge. Left eye exhibits no discharge. Neck: Normal range of motion. Neck supple. No tracheal deviation present. Cardiovascular: Normal rate, regular rhythm and normal heart sounds. No murmur heard. Pulmonary/Chest: Effort normal and breath sounds normal. No respiratory distress. He has no wheezes. He has no rales. Abdominal: Soft. Bowel sounds are normal. There is no tenderness. There is no rebound and no guarding. Musculoskeletal: Normal range of motion. He exhibits no edema, tenderness or deformity. Neurological: He is alert and oriented to person, place, and time. Skin:   + Target-like lesions to scalp with central clearing and raised borders  + Erythematous papules to bilateral arms, legs, soles of hands, all with blanching    Psychiatric: His behavior is normal.   Nursing note and vitals reviewed. Diagnostic Study Results     Labs -     Recent Results (from the past 12 hour(s))   POC INFLUENZA A & B SCREEN    Collection Time: 01/26/18  1:54 PM   Result Value Ref Range    Influenza A Ag (POC) neg     Influenza B Ag (POC) neg     Influenza Ag (POC) negative ctrl. accp     Influenza Ag (POC) positive ctrl. accp     LOT NUMBER [id number]    CBC WITH AUTOMATED DIFF    Collection Time: 01/26/18  5:19 PM   Result Value Ref Range    WBC 13.0 (H) 4.1 - 11.1 K/uL    RBC 5.05 4. 10 - 5.70 M/uL    HGB 15.8 12.1 - 17.0 g/dL    HCT 45.7 36.6 - 50.3 %    MCV 90.5 80.0 - 99.0 FL    MCH 31.3 26.0 - 34.0 PG    MCHC 34.6 30.0 - 36.5 g/dL    RDW 12.1 11.5 - 14.5 %    PLATELET 317 284 - 513 K/uL    MPV 10.4 8.9 - 12.9 FL    NRBC 0.0 0  WBC    ABSOLUTE NRBC 0.00 0.00 - 0.01 K/uL    NEUTROPHILS 87 (H) 32 - 75 %    LYMPHOCYTES 7 (L) 12 - 49 %    MONOCYTES 5 5 - 13 %    EOSINOPHILS 1 0 - 7 %    BASOPHILS 0 0 - 1 %    IMMATURE GRANULOCYTES 1 (H) 0.0 - 0.5 %    ABS. NEUTROPHILS 11.3 (H) 1.8 - 8.0 K/UL    ABS. LYMPHOCYTES 0.9 0.8 - 3.5 K/UL    ABS. MONOCYTES 0.6 0.0 - 1.0 K/UL    ABS. EOSINOPHILS 0.1 0.0 - 0.4 K/UL    ABS. BASOPHILS 0.0 0.0 - 0.1 K/UL    ABS. IMM.  GRANS. 0.1 (H) 0.00 - 0.04 K/UL    DF AUTOMATED     METABOLIC PANEL, COMPREHENSIVE    Collection Time: 01/26/18  5:19 PM   Result Value Ref Range    Sodium 136 136 - 145 mmol/L    Potassium 4.1 3.5 - 5.1 mmol/L    Chloride 103 97 - 108 mmol/L    CO2 28 21 - 32 mmol/L    Anion gap 5 5 - 15 mmol/L    Glucose 108 (H) 65 - 100 mg/dL    BUN 13 6 - 20 MG/DL    Creatinine 1.23 0.70 - 1.30 MG/DL    BUN/Creatinine ratio 11 (L) 12 - 20      GFR est AA >60 >60 ml/min/1.73m2    GFR est non-AA >60 >60 ml/min/1.73m2    Calcium 9.0 8.5 - 10.1 MG/DL    Bilirubin, total 1.5 (H) 0.2 - 1.0 MG/DL    ALT (SGPT) 29 12 - 78 U/L    AST (SGOT) 17 15 - 37 U/L    Alk. phosphatase 61 45 - 117 U/L    Protein, total 7.4 6.4 - 8.2 g/dL    Albumin 4.0 3.5 - 5.0 g/dL    Globulin 3.4 2.0 - 4.0 g/dL    A-G Ratio 1.2 1.1 - 2.2     SED RATE (ESR)    Collection Time: 01/26/18  5:19 PM   Result Value Ref Range    Sed rate, automated 7 0 - 20 mm/hr   STREP AG SCREEN, GROUP A    Collection Time: 01/26/18  5:43 PM   Result Value Ref Range    Group A Strep Ag ID NEGATIVE  NEG         Radiologic Studies -     CXR Results  (Last 48 hours)               01/26/18 1743  XR CHEST PA LAT Final result    Impression:  IMPRESSION:    No acute cardiopulmonary disease radiographically. .  . Narrative:  INDICATION:  pna. . Since       EXAM: 2 VIEW CHEST RADIOGRAPH. COMPARISON: . Jairo Godwin FINDINGS: Frontal and lateral views of the chest show clear lungs. . The heart,   mediastinum and pulmonary vasculature are stable. AICD from the left is stable. The bony thorax is unremarkable for age. ..                   Medical Decision Making   I am the first provider for this patient. I reviewed the vital signs, available nursing notes, past medical history, past surgical history, family history and social history. Vital Signs-Reviewed the patient's vital signs. Patient Vitals for the past 12 hrs:   Temp Pulse Resp BP SpO2   01/26/18 1513 97.5 °F (36.4 °C) 65 15 132/64 100 %         Records Reviewed: Old Medical Records    Provider Notes (Medical Decision Making):   DDx: consistent with Erythema multiforme. Low concern for rheumatic fever or infective endocarditis. Will treat with steroids and likely discharge if remaining work up is negative. ED Course:   Initial assessment performed.  The patients presenting problems have been discussed, and they are in agreement with the care plan formulated and outlined with them. I have encouraged them to ask questions as they arise throughout their visit. Medications   predniSONE (DELTASONE) tablet 60 mg (60 mg Oral Given 1/26/18 1825)       Disposition:  Discharge Note:  6:59 PM  The pt is ready for discharge. The pt's signs, symptoms, diagnosis, and discharge instructions have been discussed and pt has conveyed their understanding. The pt is to follow up as recommended or return to ER should their symptoms worsen. Plan has been discussed and pt is in agreement. This note is prepared by Cierra Stanley, acting as a Scribe for [employer], DO    [employer], DO: The scribe's documentation has been prepared under my direction and personally reviewed by me in its entirety. I confirm that the notes above accurately reflects all work, treatment, procedures, and medical decision making performed by me. PLAN:  1. Current Discharge Medication List      START taking these medications    Details   predniSONE (DELTASONE) 10 mg tablet Take as follows   Day 1-2: 6 tabs PO -   Day 3-4: 5 tabs PO -   Day 5-6: 4 tabs PO -   Day 7-8: 3 tabs PO -   Day 9-10: 2 tabs PO -   Day 11-12: 1 tab PO    Dispense 42 tabs  Qty: 42 Tab, Refills: 0           2. Follow-up Information     Follow up With Details Comments Contact Info    Latoya Macias MD Call in 1 day  [address]  [phone]      [facility] EMERGENCY DEPT  If symptoms worsen [address]  [address] [facility]  [phone]        Return to ED if worse     Diagnosis     Clinical Impression:   1. Erythema multiforme    2. Viral syndrome        Attestations: This note is prepared by Cierra Stanley, acting as a Scribe for [employer], DO    [employer], DO: The scribe's documentation has been prepared under my direction and personally reviewed by me in its entirety.  I confirm that the notes above accurately reflects all work, treatment, procedures, and medical decision making performed by me.

## 2018-01-26 NOTE — UC NOTE
After being seen by provider pt advised to be evaluated in the emergency department. Report called to ED [facility] ED by RAGHAVENDRA Castillo NP

## 2018-01-27 NOTE — ED NOTES
Discharge instructions reviewed w/ pt and copy given by Dr. Shaq Pa. Pt discharged ambulatory from the.

## 2018-01-28 ENCOUNTER — HOSPITAL ENCOUNTER (EMERGENCY)
Age: 53
Discharge: OTHER INSTITUTION | End: 2018-01-28
Attending: EMERGENCY MEDICINE
Payer: COMMERCIAL

## 2018-01-28 ENCOUNTER — APPOINTMENT (OUTPATIENT)
Dept: GENERAL RADIOLOGY | Age: 53
End: 2018-01-28
Attending: EMERGENCY MEDICINE
Payer: COMMERCIAL

## 2018-01-28 VITALS
HEART RATE: 50 BPM | DIASTOLIC BLOOD PRESSURE: 55 MMHG | OXYGEN SATURATION: 95 % | HEIGHT: 71 IN | SYSTOLIC BLOOD PRESSURE: 129 MMHG | BODY MASS INDEX: 31.23 KG/M2 | RESPIRATION RATE: 16 BRPM | WEIGHT: 223.11 LBS | TEMPERATURE: 98.2 F

## 2018-01-28 DIAGNOSIS — L51.9 ERYTHEMA MULTIFORME: Primary | ICD-10-CM

## 2018-01-28 LAB
ALBUMIN SERPL-MCNC: 3.8 G/DL (ref 3.5–5)
ALBUMIN/GLOB SERPL: 1 {RATIO} (ref 1.1–2.2)
ALP SERPL-CCNC: 61 U/L (ref 45–117)
ALT SERPL-CCNC: 24 U/L (ref 12–78)
ANION GAP SERPL CALC-SCNC: 8 MMOL/L (ref 5–15)
APPEARANCE UR: CLEAR
AST SERPL-CCNC: 14 U/L (ref 15–37)
BACTERIA SPEC CULT: NORMAL
BACTERIA URNS QL MICRO: NEGATIVE /HPF
BASOPHILS # BLD: 0 K/UL (ref 0–0.1)
BASOPHILS NFR BLD: 0 % (ref 0–1)
BILIRUB SERPL-MCNC: 0.6 MG/DL (ref 0.2–1)
BILIRUB UR QL: NEGATIVE
BUN SERPL-MCNC: 18 MG/DL (ref 6–20)
BUN/CREAT SERPL: 15 (ref 12–20)
CALCIUM SERPL-MCNC: 9 MG/DL (ref 8.5–10.1)
CHLORIDE SERPL-SCNC: 101 MMOL/L (ref 97–108)
CO2 SERPL-SCNC: 27 MMOL/L (ref 21–32)
COLOR UR: NORMAL
CREAT SERPL-MCNC: 1.21 MG/DL (ref 0.7–1.3)
DIFFERENTIAL METHOD BLD: ABNORMAL
EOSINOPHIL # BLD: 0.1 K/UL (ref 0–0.4)
EOSINOPHIL NFR BLD: 1 % (ref 0–7)
EPITH CASTS URNS QL MICRO: NORMAL /LPF
ERYTHROCYTE [DISTWIDTH] IN BLOOD BY AUTOMATED COUNT: 12.3 % (ref 11.5–14.5)
ERYTHROCYTE [SEDIMENTATION RATE] IN BLOOD: 5 MM/HR (ref 0–20)
GLOBULIN SER CALC-MCNC: 3.9 G/DL (ref 2–4)
GLUCOSE SERPL-MCNC: 103 MG/DL (ref 65–100)
GLUCOSE UR STRIP.AUTO-MCNC: NEGATIVE MG/DL
HCT VFR BLD AUTO: 49.1 % (ref 36.6–50.3)
HGB BLD-MCNC: 16.9 G/DL (ref 12.1–17)
HGB UR QL STRIP: NEGATIVE
HYALINE CASTS URNS QL MICRO: NORMAL /LPF (ref 0–5)
IMM GRANULOCYTES # BLD: 0.1 K/UL (ref 0–0.04)
IMM GRANULOCYTES NFR BLD AUTO: 1 % (ref 0–0.5)
INR PPP: 1 (ref 0.9–1.1)
KETONES UR QL STRIP.AUTO: NEGATIVE MG/DL
LACTATE SERPL-SCNC: 1.7 MMOL/L (ref 0.4–2)
LEUKOCYTE ESTERASE UR QL STRIP.AUTO: NEGATIVE
LYMPHOCYTES # BLD: 0.9 K/UL (ref 0.8–3.5)
LYMPHOCYTES NFR BLD: 6 % (ref 12–49)
MCH RBC QN AUTO: 31.2 PG (ref 26–34)
MCHC RBC AUTO-ENTMCNC: 34.4 G/DL (ref 30–36.5)
MCV RBC AUTO: 90.6 FL (ref 80–99)
MONOCYTES # BLD: 0.8 K/UL (ref 0–1)
MONOCYTES NFR BLD: 5 % (ref 5–13)
NEUTS SEG # BLD: 14.7 K/UL (ref 1.8–8)
NEUTS SEG NFR BLD: 89 % (ref 32–75)
NITRITE UR QL STRIP.AUTO: NEGATIVE
NRBC # BLD: 0 K/UL (ref 0–0.01)
NRBC BLD-RTO: 0 PER 100 WBC
PH UR STRIP: 5.5 [PH] (ref 5–8)
PLATELET # BLD AUTO: 297 K/UL (ref 150–400)
PMV BLD AUTO: 10.6 FL (ref 8.9–12.9)
POTASSIUM SERPL-SCNC: 3.7 MMOL/L (ref 3.5–5.1)
PROT SERPL-MCNC: 7.7 G/DL (ref 6.4–8.2)
PROT UR STRIP-MCNC: NEGATIVE MG/DL
PROTHROMBIN TIME: 10 SEC (ref 9–11.1)
RBC # BLD AUTO: 5.42 M/UL (ref 4.1–5.7)
RBC #/AREA URNS HPF: NORMAL /HPF (ref 0–5)
SERVICE CMNT-IMP: NORMAL
SODIUM SERPL-SCNC: 136 MMOL/L (ref 136–145)
SP GR UR REFRACTOMETRY: 1.02 (ref 1–1.03)
TROPONIN I SERPL-MCNC: <0.04 NG/ML
UA: UC IF INDICATED,UAUC: NORMAL
UROBILINOGEN UR QL STRIP.AUTO: 0.2 EU/DL (ref 0.2–1)
WBC # BLD AUTO: 16.5 K/UL (ref 4.1–11.1)
WBC URNS QL MICRO: NORMAL /HPF (ref 0–4)

## 2018-01-28 PROCEDURE — 85652 RBC SED RATE AUTOMATED: CPT | Performed by: EMERGENCY MEDICINE

## 2018-01-28 PROCEDURE — 80053 COMPREHEN METABOLIC PANEL: CPT | Performed by: EMERGENCY MEDICINE

## 2018-01-28 PROCEDURE — 96374 THER/PROPH/DIAG INJ IV PUSH: CPT

## 2018-01-28 PROCEDURE — 83605 ASSAY OF LACTIC ACID: CPT | Performed by: EMERGENCY MEDICINE

## 2018-01-28 PROCEDURE — 96361 HYDRATE IV INFUSION ADD-ON: CPT

## 2018-01-28 PROCEDURE — 85025 COMPLETE CBC W/AUTO DIFF WBC: CPT | Performed by: EMERGENCY MEDICINE

## 2018-01-28 PROCEDURE — 84484 ASSAY OF TROPONIN QUANT: CPT | Performed by: EMERGENCY MEDICINE

## 2018-01-28 PROCEDURE — 36415 COLL VENOUS BLD VENIPUNCTURE: CPT | Performed by: EMERGENCY MEDICINE

## 2018-01-28 PROCEDURE — 99284 EMERGENCY DEPT VISIT MOD MDM: CPT

## 2018-01-28 PROCEDURE — 81001 URINALYSIS AUTO W/SCOPE: CPT | Performed by: EMERGENCY MEDICINE

## 2018-01-28 PROCEDURE — 77030029684 HC NEB SM VOL KT MONA -A

## 2018-01-28 PROCEDURE — 94640 AIRWAY INHALATION TREATMENT: CPT

## 2018-01-28 PROCEDURE — 74011000250 HC RX REV CODE- 250: Performed by: EMERGENCY MEDICINE

## 2018-01-28 PROCEDURE — 85610 PROTHROMBIN TIME: CPT | Performed by: EMERGENCY MEDICINE

## 2018-01-28 PROCEDURE — 71046 X-RAY EXAM CHEST 2 VIEWS: CPT

## 2018-01-28 PROCEDURE — 87040 BLOOD CULTURE FOR BACTERIA: CPT | Performed by: EMERGENCY MEDICINE

## 2018-01-28 PROCEDURE — 74011250636 HC RX REV CODE- 250/636: Performed by: EMERGENCY MEDICINE

## 2018-01-28 RX ORDER — IPRATROPIUM BROMIDE AND ALBUTEROL SULFATE 2.5; .5 MG/3ML; MG/3ML
3 SOLUTION RESPIRATORY (INHALATION) ONCE
Status: COMPLETED | OUTPATIENT
Start: 2018-01-28 | End: 2018-01-28

## 2018-01-28 RX ADMIN — IPRATROPIUM BROMIDE AND ALBUTEROL SULFATE 3 ML: .5; 3 SOLUTION RESPIRATORY (INHALATION) at 11:41

## 2018-01-28 RX ADMIN — METHYLPREDNISOLONE SODIUM SUCCINATE 125 MG: 125 INJECTION, POWDER, FOR SOLUTION INTRAMUSCULAR; INTRAVENOUS at 11:22

## 2018-01-28 RX ADMIN — SODIUM CHLORIDE 500 ML: 900 INJECTION, SOLUTION INTRAVENOUS at 11:42

## 2018-01-28 NOTE — ED NOTES
Pt. Woke up Friday with a rash, was seen here and started to fade but then came back on Sat. Night and started with sore throat, itching and whelps.

## 2018-01-28 NOTE — ED NOTES
1710: Spoke with Nurse from [facility] who is requesting disc of patient X-ray and Flu result from [employer]. Flu result faxed to [facility] and nurse transferred to radiology per Dr. Haylee Miller, who states that image can be electronically sent via radiology.

## 2018-01-28 NOTE — PROGRESS NOTES
Spoke with Guillermina Rodriguez from [address] [place] [address] regarding pt's radiology disc. Ruth advised that radiology images were sent electronically. Medical records that were requested were faxed.

## 2018-01-28 NOTE — ED PROVIDER NOTES
EMERGENCY DEPARTMENT HISTORY AND PHYSICAL EXAM      Date: 1/28/2018  Patient Name: Heena Brady    History of Presenting Illness     Chief Complaint   Patient presents with    Allergic Reaction     pt seen for allergic reaction Friday, pt awoke this morning with worsening symptoms       History Provided By: Patient    HPI: Heena Brady, 46 y.o. 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 He reports additional symptoms of 100.7 F fever yesterday, arthralgias, SOB, fever, wheezing, lightheadedness, and dysuria. He originally went to [facility] on 1/26/18 for a rash to his head, waist, and torso and for having a fever of 100.7 F. Pt was transferred to [facility] ED from [facility] for further evaluation. He had a root canal done 3 weeks prior to the visit, and finished his antibiotics (keflex) 8 days prior to the visit. Pt was diagnosed with erythema multiforme and was sent home on prednisone taper. He had a clear CXR and stable vitals. After his visit to [facility] ED, he took the prednisone as prescribed and his symptoms were improving yesterday. However, pt notes that today he woke up with worsening symptoms. He reports that he has welts on his tongue, and he has chest tightness when he takes a deep breath. Pt notes that he took 1 benadryl with little relief. He reports that he has CHF and his EF is 45%. He denies any new medications other than the keflex or any tick bites. Pt denies focal chest pain, or use of fluid pills. PCP: Shawn Farmer MD   Cardiologist: Dr. Cici Castro    There are no other complaints, changes, or physical findings at this time.     Current Outpatient Prescriptions   Medication Sig Dispense Refill    metoprolol succinate (TOPROL-XL) 50 mg XL tablet TAKE ONE TABLET BY MOUTH ONCE DAILY 90 Tab 1    predniSONE (DELTASONE) 10 mg tablet Take as follows   Day 1-2: 6 tabs PO -   Day 3-4: 5 tabs PO -   Day 5-6: 4 tabs PO -   Day 7-8: 3 tabs PO -   Day 9-10: 2 tabs PO -   Day 11-12: 1 tab PO    Dispense 42 tabs 42 Tab 0    enalapril (VASOTEC) 5 mg tablet Take 1 Tab by mouth two (2) times a day. 180 Tab 1    flecainide (TAMBOCOR) 100 mg tablet Take 1 Tab by mouth two (2) times a day. 60 Tab 12    loratadine (CLARITIN) 10 mg tablet Take 10 mg by mouth.  aspirin (ASPIRIN) 325 mg tablet Take 1 Tab by mouth daily. 90 Tab 3    ALPRAZolam (XANAX) 0.5 mg tablet Take 1 Tab by mouth two (2) times daily as needed for Anxiety. Max Daily Amount: 1 mg. 20 Tab 0    atorvastatin (LIPITOR) 40 mg tablet TAKE ONE TABLET BY MOUTH DAILY. (Patient taking differently: every evening. TAKE ONE TABLET BY MOUTH DAILY. ) 30 Tab 11    albuterol (VENTOLIN HFA) 90 mcg/actuation inhaler INHALE TWO PUFFS BY MOUTH EVERY 4 HOURS AS NEEDED FOR  WHEEZING 1 Inhaler 2    mometasone-formoterol (DULERA) 200-5 mcg/actuation HFA inhaler Take 2 Puffs by inhalation two (2) times a day. 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     Pacemaker        Past Surgical History:  Past Surgical History:   Procedure Laterality Date    CARDIAC CATHETERIZATION  9/13/2011    Hemodynamics - RA 5, RV 43/12 LVEDP12, RV sat 81% FA 98%, systemic ventricular function diminished, normal coronaries    COLONOSCOPY N/A 4/21/2017    COLON[facility]COPY performed by Chelly [facility] at [facility]  4/21/2017         HX APPENDECTOMY      HX HEART CATHETERIZATION      HX PACEMAKER  6/2008     [facility] # [id number]. Family History:  Family History   Problem Relation Age of Onset    High Cholesterol Mother     COPD Mother     Diabetes Father     COPD Father     No Known Problems Sister     Heart Disease Other        Social History:  Social History   Substance Use Topics    Smoking status: Never Smoker    Smokeless tobacco: Former User    Alcohol use 18.0 oz/week     12 Cans of beer, 18 Standard drinks or equivalent per week      Comment: 1-12 beers per week       Allergies:  No Known Allergies      Review of Systems   Review of Systems   Constitutional: Positive for fever. Negative for activity change and chills. HENT: Negative for congestion and sore throat. Eyes: Negative for pain and redness. Respiratory: Positive for chest tightness, shortness of breath and wheezing. Negative for cough. Cardiovascular: Negative for chest pain and palpitations. Gastrointestinal: Negative for abdominal pain, diarrhea, nausea and vomiting. Genitourinary: Positive for dysuria. Negative for frequency and urgency. Musculoskeletal: Positive for arthralgias. Negative for back pain and neck pain. Skin: Positive for rash. Neurological: Positive for light-headedness. Negative for syncope and headaches. Psychiatric/Behavioral: Negative for confusion. All other systems reviewed and are negative. Physical Exam   Physical Exam   Constitutional: He is oriented to person, place, and time. He appears well-developed and well-nourished. No distress. HENT:   Head: Normocephalic. Nose: Nose normal.   Mouth/Throat: Oropharynx is clear and moist. No oropharyngeal exudate. Eyes: Conjunctivae are normal. Pupils are equal, round, and reactive to light.  No scleral icterus. Neck: Normal range of motion. Neck supple. No JVD present. No tracheal deviation present. No thyromegaly present. Cardiovascular: Normal rate, regular rhythm and intact distal pulses. Exam reveals no gallop and no friction rub. No murmur heard. Pulmonary/Chest: Effort normal and breath sounds normal. No stridor. No respiratory distress. He has no wheezes. He has no rales. Abdominal: Soft. Bowel sounds are normal. He exhibits no distension. There is no tenderness. There is no rebound and no guarding. Musculoskeletal: Normal range of motion. He exhibits no edema. Lymphadenopathy:     He has no cervical adenopathy. Neurological: He is alert and oriented to person, place, and time. No cranial nerve deficit. He exhibits normal muscle tone. Coordination normal.   Skin: Skin is warm and dry. No rash noted. He is not diaphoretic. No erythema. Erythematous rash on trunk, arms, scalp, palms   Several target lesions minimally raised   He has 1 small lesion on his tongue   No lip lesions   No disclamation    Psychiatric: He has a normal mood and affect. His behavior is normal.   Nursing note and vitals reviewed. Diagnostic Study Results     Labs -     No results found for this or any previous visit (from the past 12 hour(s)). Radiologic Studies -     CXR Results  (Last 48 hours)    None            Medical Decision Making   I am the first provider for this patient. I reviewed the vital signs, available nursing notes, past medical history, past surgical history, family history and social history. Vital Signs-Reviewed the patient's vital signs. No data found. Pulse Oximetry Analysis - 99% on room air    Cardiac Monitor:   Rate: 52 bpm  Rhythm: Sinus Bradycardia     Records Reviewed: Old Medical Records    Provider Notes (Medical Decision Making):   DDx: erythema multiforme, anaphylaxis     ED Course:   Initial assessment performed.  The patients presenting problems have been discussed, and they are in agreement with the care plan formulated and outlined with them. I have encouraged them to ask questions as they arise throughout their visit. 10:32 AM  Records reviewed. Pt was seen on 1/26 at [facility] for a rash to his head, waist, and torso and for having a fever of 100.7 F. He denied any tick bites. Pt was transferred to [facility] ED from [facility] for further evaluation. He had a root canal done 3 weeks prior to the visit, and finished his antibiotics 8 days prior to the visit. Pt was diagnosed with erythema multiforme and was sent home on prednisone taper. He had a clear CXR and stable vitals. Written by Chris Mittal, ED Scribe, as dictated by Jose Tavarez MD.    CONSULT NOTE:  12:10 PM  Jose Tavarez MD spoke with Dr. Yves Peoples  Specialty: Emergency Medicine  Discussed pt's hx, disposition, and available diagnostic and imaging results. Reviewed care plans. Consultant agrees with plans as outlined. Dr. Jenny Arora has accepted pt to [facility] ED. Written by Chris Mittal, ED Scribe, as dictated by Jose Tavarez MD.    Disposition:  Transfer Note:  12:10 PM  Pt is being transferred to [facility] ED, transfer is accepted by Dr. Yves Peoples. The reasons for their transfer have been discussed with them and available family. They convey agreement and understanding of the need to be transferred as explained to them by Jose Tavarez MD.    Will transfer to [facility] ED for eval by dermatology for possible erythema multiforme; febrile at home; vs wnl; wbc elevated but pt on current steroid course; no source of rash other than recent keflex rx, although 2 wks prior; no new medications; no recent tick bites or exposures; +oral lesion on tongue with multiple target lesions on scalp and extremities as well as palms; ESR normal; negative trop; Jose Tavarez MD        PLAN:  1. Transfer to [facility] ED    Diagnosis     Clinical Impression:   1. Erythema multiforme        Attestations:     This note is prepared by Jonas Reed, acting as Scribe for MD Josette Walden MD: The scribe's documentation has been prepared under my direction and personally reviewed by me in its entirety. I confirm that the note above accurately reflects all work, treatment, procedures, and medical decision making performed by me.

## 2018-01-30 DIAGNOSIS — I48.0 PAROXYSMAL ATRIAL FIBRILLATION (HCC): ICD-10-CM

## 2018-01-30 RX ORDER — METOPROLOL SUCCINATE 50 MG/1
TABLET, EXTENDED RELEASE ORAL
Qty: 90 TAB | Refills: 1 | Status: SHIPPED | OUTPATIENT
Start: 2018-01-30 | End: 2018-07-30 | Stop reason: SDUPTHER

## 2018-01-30 NOTE — TELEPHONE ENCOUNTER
Request for Toprol-XL 50 mg daily. Last office visit 1/18/18, next office visit 3/26/18. Refills per verbal order from Dr. Elie Méndez.

## 2018-01-31 ENCOUNTER — TELEPHONE (OUTPATIENT)
Dept: CARDIOLOGY CLINIC | Age: 53
End: 2018-01-31

## 2018-01-31 NOTE — TELEPHONE ENCOUNTER
Verified patient with two types of identifiers. Patient calling to state that he had an anaphylactic reaction to an antibiotic and will begin an increased dose of 80 mg of Prednisone tomorrow. Patient wanted to inform Dr. Veliz Clear because he has been having fluttering in his chest and wanted to make sure it would be okay to start on increased dose of steroids. Patient also states that he sent in a manual transmission the other day and wanted to make sure Tram received. Will notify MD and Tram. Patient verbalized understanding and will call with any other questions.

## 2018-02-03 LAB
BACTERIA SPEC CULT: NORMAL
SERVICE CMNT-IMP: NORMAL

## 2018-02-06 ENCOUNTER — CLINICAL SUPPORT (OUTPATIENT)
Dept: CARDIOLOGY CLINIC | Age: 53
End: 2018-02-06

## 2018-02-06 DIAGNOSIS — Z95.810 PRESENCE OF AUTOMATIC CARDIOVERTER/DEFIBRILLATOR (AICD): Primary | ICD-10-CM

## 2018-02-15 ENCOUNTER — OFFICE VISIT (OUTPATIENT)
Dept: INTERNAL MEDICINE CLINIC | Age: 53
End: 2018-02-15

## 2018-02-15 VITALS
RESPIRATION RATE: 20 BRPM | TEMPERATURE: 97.6 F | WEIGHT: 216 LBS | SYSTOLIC BLOOD PRESSURE: 125 MMHG | HEIGHT: 71 IN | BODY MASS INDEX: 30.24 KG/M2 | DIASTOLIC BLOOD PRESSURE: 73 MMHG | HEART RATE: 70 BPM | OXYGEN SATURATION: 97 %

## 2018-02-15 DIAGNOSIS — E78.00 HYPERCHOLESTEROLEMIA WITHOUT HYPERTRIGLYCERIDEMIA: ICD-10-CM

## 2018-02-15 DIAGNOSIS — L50.9 URTICARIAL DERMATITIS: Primary | ICD-10-CM

## 2018-02-15 DIAGNOSIS — I10 HYPERTENSION, ESSENTIAL, BENIGN: ICD-10-CM

## 2018-02-15 RX ORDER — AMOXICILLIN 500 MG/1
CAPSULE ORAL
COMMUNITY
Start: 2017-12-28 | End: 2018-08-23 | Stop reason: ALTCHOICE

## 2018-02-15 NOTE — PROGRESS NOTES
Benita Richey  Identified pt with two pt identifiers(name and ). Chief Complaint   Patient presents with   [facility] Follow-up       Reviewed record In preparation for visit and have obtained necessary documentation. Has info on advanced directive but has not filled them out. 1. Have you been to the ER, urgent care clinic or hospitalized since your last visit? ED [facility], [facility] ER      2. Have you seen or consulted any other health care providers outside of the [address] [place] [address] since your last visit? Include any pap smears or colon screening. Colonoscopy, cardiology     Vitals reviewed with provider. Health Maintenance reviewed: There are no preventive care reminders to display for this patient. Wt Readings from Last 3 Encounters:   02/15/18 216 lb (98 kg)   18 223 lb 1.7 oz (101.2 kg)   18 223 lb 15.8 oz (101.6 kg)        Temp Readings from Last 3 Encounters:   02/15/18 97.6 °F (36.4 °C) (Oral)   18 98.2 °F (36.8 °C)   18 97.5 °F (36.4 °C)        BP Readings from Last 3 Encounters:   02/15/18 125/73   18 129/55   18 125/48        Pulse Readings from Last 3 Encounters:   02/15/18 70   18 (!) 50   18 65        Vitals:    02/15/18 1632   BP: 125/73   Pulse: 70   Resp: 20   Temp: 97.6 °F (36.4 °C)   TempSrc: Oral   SpO2: 97%   Weight: 216 lb (98 kg)   Height: 5' 11\" (1.803 m)   PainSc:   0 - No pain          Learning Assessment:   :       Learning Assessment 2014   PRIMARY LEARNER Patient   PRIMARY LANGUAGE ENGLISH   LEARNER PREFERENCE PRIMARY READING     DEMONSTRATION     OTHER (COMMENT)   ANSWERED BY patient   RELATIONSHIP SELF        Depression Screening:   :       PHQ over the last two weeks 2017   Little interest or pleasure in doing things Not at all   Feeling down, depressed or hopeless Not at all   Total Score PHQ 2 0        Fall Risk Assessment:   :     No flowsheet data found.      Abuse Screening:   : No flowsheet data found.      ADL Screening:   :       ADL Assessment 2/18/2015   Feeding yourself No Help Needed   Getting from bed to chair No Help Needed   Getting dressed No Help Needed   Bathing or showering No Help Needed   Walk across the room (includes cane/walker) No Help Needed   Using the telphone No Help Needed   Taking your medications No Help Needed   Preparing meals No Help Needed   Managing money (expenses/bills) No Help Needed   Moderately strenuous housework (laundry) No Help Needed   Shopping for personal items (toiletries/medicines) No Help Needed   Shopping for groceries No Help Needed   Driving No Help Needed   Climbing a flight of stairs No Help Needed   Getting to places beyond walking distances No Help Needed

## 2018-02-15 NOTE — PROGRESS NOTES
HISTORY OF PRESENT ILLNESS  Valeria Linares is a 46 y.o. male. HPI  He was seen at urgent care on 1/26, [facility] emergency room on 1/26, and again on 1/28 for a rash, fever, arthralgias, shortness of breath. The rash was present on his head, hands, feet, back and chest.  At the time of the second emergency room visit he was referred to [facility] where dermatologist was available for immediate consultation. There he underwent a skin biopsy which was consistent with urticaria with no vasculitis. He continues on a tapering course of prednisone. He is doing better, although he had some increase in whleps and itching when he made the last dose decrease. Has next decrease is 2/18. He no longer has fever or increase over usual shortness of breath. Does have the contact information for the dermatologist at [facility] if the rash returns. 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     Diet and Lifestyle: generally follows a low fat low cholesterol diet, generally follows a low sodium diet, exercises sporadically, nonsmoker  Medication compliance: compliant all of the time  Medication side effects: none      Patient Active Problem List   Diagnosis Code    Hypercholesterolemia without hypertriglyceridemia E78.00    Hypertension, essential, benign I10    History of colonoscopy with polypectomy Z98.890, Z86.010    Corrected transposition of great vessels Q20.5    H/O complete atrioventricular block Z86.79    Sinoatrial node dysfunction ([address] I49.5    Paroxsymal atrial fibrillation I48.91    Paroxysmal VT ([address] I47.2    ICD (implantable cardioverter-defibrillator) in place Z95.810    Moderate persistent asthma without complication P78.79     Past Medical History:   Diagnosis Date    Anxiety     anxiety vs panic disorder    Arrhythmia     Atiral fibrillation, paroxysmal V tach    Asthma     Atrioventricular block, complete (HCC)     Congenital heart problem     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  Pacemaker      Past Surgical History:   Procedure Laterality Date    CARDIAC CATHETERIZATION  9/13/2011    Hemodynamics - RA 5, RV 43/12 LVEDP12, RV sat 81% FA 98%, systemic ventricular function diminished, normal coronaries    COLONOSCOPY N/A 4/21/2017    COLON[facility]leana [facility]bu[facility] at [facility]  4/21/2017         HX APPENDECTOMY      HX HEART CATHETERIZATION      HX PACEMAKER  6/2008     [facility] Scientific # [id number]. Social History     Social History    Marital status:      Spouse name: N/A    Number of children: N/A    Years of education: N/A     Social History Main Topics    Smoking status: Never Smoker    Smokeless tobacco: Former User    Alcohol use 18.0 oz/week     12 Cans of beer, 18 Standard drinks or equivalent per week      Comment: 1-12 beers per week    Drug use: No    Sexual activity: Yes     Partners: Female     Other Topics Concern    None     Social History Narrative     Family History   Problem Relation Age of Onset    High Cholesterol Mother     COPD Mother     Diabetes Father     COPD Father     No Known Problems Sister     Heart Disease Other      Allergies   Allergen Reactions    Cephalexin Hives     Current Outpatient Prescriptions   Medication Sig Dispense Refill    Cetirizine (ZYRTEC) 10 mg cap Take  by mouth.       amoxicillin (AMOXIL) 500 mg capsule Prior to dental work      metoprolol succinate (TOPROL-XL) 50 mg XL tablet TAKE ONE TABLET BY MOUTH ONCE DAILY 90 Tab 1    predniSONE (DELTASONE) 10 mg tablet Take as follows   Day 1-2: 6 tabs PO -   Day 3-4: 5 tabs PO -   Day 5-6: 4 tabs PO -   Day 7-8: 3 tabs PO -   Day 9-10: 2 tabs PO -   Day 11-12: 1 tab PO    Dispense 42 tabs 42 Tab 0    enalapril (VASOTEC) 5 mg tablet Take 1 Tab by mouth two (2) times a day. 180 Tab 1    flecainide (TAMBOCOR) 100 mg tablet Take 1 Tab by mouth two (2) times a day. 60 Tab 12    aspirin (ASPIRIN) 325 mg tablet Take 1 Tab by mouth daily. 90 Tab 3    ALPRAZolam (XANAX) 0.5 mg tablet Take 1 Tab by mouth two (2) times daily as needed for Anxiety. Max Daily Amount: 1 mg. 20 Tab 0    atorvastatin (LIPITOR) 40 mg tablet TAKE ONE TABLET BY MOUTH DAILY. (Patient taking differently: every evening. TAKE ONE TABLET BY MOUTH DAILY. ) 30 Tab 11    albuterol (VENTOLIN HFA) 90 mcg/actuation inhaler INHALE TWO PUFFS BY MOUTH EVERY 4 HOURS AS NEEDED FOR  WHEEZING 1 Inhaler 2    mometasone-formoterol (DULERA) 200-5 mcg/actuation HFA inhaler Take 2 Puffs by inhalation two (2) times a day. (Patient taking differently: Take 2 Puffs by inhalation as needed.) 1 Inhaler 11    loratadine (CLARITIN) 10 mg tablet Take 10 mg by mouth. ROS     Visit Vitals    /73 (BP 1 Location: Left arm, BP Patient Position: Sitting)    Pulse 70    Temp 97.6 °F (36.4 °C) (Oral)    Resp 20    Ht 5' 11\" (1.803 m)    Wt 216 lb (98 kg)    SpO2 97%    BMI 30.13 kg/m2     Physical Exam   Constitutional: He is oriented to person, place, and time. He appears well-developed and well-nourished. HENT:   Head: Normocephalic and atraumatic. Eyes: Conjunctivae are normal. Pupils are equal, round, and reactive to light. Neck: Neck supple. Carotid bruit is not present. Cardiovascular: Normal rate, regular rhythm, S1 normal, S2 normal and normal heart sounds. Exam reveals no gallop. No murmur heard.   Pulmonary/Chest: Effort normal and breath sounds normal. He has no wheezes. He has no rhonchi. He has no rales. Musculoskeletal: He exhibits no edema. Neurological: He is alert and oriented to person, place, and time. Skin: Skin is warm, dry and intact. No rash noted. There are a few excoriations on the arms. Psychiatric: He has a normal mood and affect. His behavior is normal.   Nursing note and vitals reviewed. Lab Results   Component Value Date/Time    WBC 16.5 (H) 01/28/2018 11:15 AM    HGB 16.9 01/28/2018 11:15 AM    HCT 49.1 01/28/2018 11:15 AM    PLATELET 695 94/96/7838 11:15 AM    MCV 90.6 01/28/2018 11:15 AM     Lab Results   Component Value Date/Time    Sodium 136 01/28/2018 11:15 AM    Potassium 3.7 01/28/2018 11:15 AM    Chloride 101 01/28/2018 11:15 AM    CO2 27 01/28/2018 11:15 AM    Anion gap 8 01/28/2018 11:15 AM    Glucose 103 (H) 01/28/2018 11:15 AM    BUN 18 01/28/2018 11:15 AM    Creatinine 1.21 01/28/2018 11:15 AM    BUN/Creatinine ratio 15 01/28/2018 11:15 AM    GFR est AA >60 01/28/2018 11:15 AM    GFR est non-AA >60 01/28/2018 11:15 AM    Calcium 9.0 01/28/2018 11:15 AM    Bilirubin, total 0.6 01/28/2018 11:15 AM    AST (SGOT) 14 (L) 01/28/2018 11:15 AM    Alk. phosphatase 61 01/28/2018 11:15 AM    Protein, total 7.7 01/28/2018 11:15 AM    Albumin 3.8 01/28/2018 11:15 AM    Globulin 3.9 01/28/2018 11:15 AM    A-G Ratio 1.0 (L) 01/28/2018 11:15 AM    ALT (SGPT) 24 01/28/2018 11:15 AM     Lab Results   Component Value Date/Time    Cholesterol, total 176 05/04/2017 09:13 AM    HDL Cholesterol 67 05/04/2017 09:13 AM    LDL, calculated 93 05/04/2017 09:13 AM    VLDL, calculated 16 05/04/2017 09:13 AM    Triglyceride 80 05/04/2017 09:13 AM    CHOL/HDL Ratio 3.0 11/04/2009 09:05 AM       ASSESSMENT and PLAN    ICD-10-CM ICD-9-CM    1. Urticarial dermatitis L50.9 708.9    2. Hypercholesterolemia without hypertriglyceridemia E78.00 272.0 LIPID PANEL   3. Hypertension, essential, benign I10 401.1      Diagnoses and all orders for this visit:    1. Urticarial dermatitis  Will follow up at [facility] dermatology is rash returns. 2. Hypercholesterolemia without hypertriglyceridemia  Hyperlipidemia is controlled  -     LIPID PANEL; Future    3. Hypertension, essential, benign  Hypertension is controlled      Follow-up Disposition:  Return in about 6 months (around 8/15/2018) for HTN, chol  Fasting lab one week prior. lab results and schedule of future lab studies reviewed with patient  reviewed diet, exercise and weight control  I have discussed the diagnosis with the patient and the intended plan as seen in the above orders. Patient is in agreement. The patient has received an after-visit summary and questions were answered concerning future plans. I have discussed medication side effects and warnings with the patient as well.

## 2018-02-15 NOTE — PATIENT INSTRUCTIONS
If rash returns with prednisone taper, can increase Zyrtec (cetirizine) to every 12 hours and Pepcid to every 12 hours while contacting dermatologist.   .Office visit in 6 months with fasting lab work a week before visit. Hives: Care Instructions  Your Care Instructions  Hives are raised, red, itchy patches of skin. They are also called wheals or welts. They usually have red borders and pale centers. Hives range in size from ¼ inch to 3 inches or more across. They may seem to move from place to place on the skin. Several hives may form a large area of raised, red skin. You can get hives after an insect sting, after taking medicine or eating certain foods, or because of infection or stress. Other causes include plants, things you breathe in, makeup, heat, cold, sunlight, and latex. You cannot spread hives to other people. Hives may last a few minutes or a few days, but a single spot may last less than 36 hours. Follow-up care is a key part of your treatment and safety. Be sure to make and go to all appointments, and call your doctor if you are having problems. It's also a good idea to know your test results and keep a list of the medicines you take. How can you care for yourself at home? · Avoid whatever you think may have caused your hives, such as a certain food or medicine. However, you may not know the cause. · Put a cool, wet towel on the area to relieve itching. · Take an over-the-counter antihistamine, such as diphenhydramine (Benadryl), cetirizine (Zyrtec), or loratadine (Claritin), to help stop the hives and calm the itching. Read and follow directions on the label. These medicines can make you feel sleepy. Do not drive while using them. · Stay away from strong soaps, detergents, and chemicals. These can make itching worse. When should you call for help? Call 911 anytime you think you may need emergency care. For example, call if:  ? · You have symptoms of a severe allergic reaction.  These may include:  ¨ Sudden raised, red areas (hives) all over your body. ¨ Swelling of the throat, mouth, lips, or tongue. ¨ Trouble breathing. ¨ Passing out (losing consciousness). Or you may feel very lightheaded or suddenly feel weak, confused, or restless. ?Call your doctor now or seek immediate medical care if:  ? · You have symptoms of an allergic reaction, such as:  ¨ A rash or hives (raised, red areas on the skin). ¨ Itching. ¨ Swelling. ¨ Belly pain, nausea, or vomiting. ? · You get hives after you start a new medicine. ? · Hives have not gone away after 24 hours. ? Watch closely for changes in your health, and be sure to contact your doctor if:  ? · You do not get better as expected. Where can you learn more? Go to http://genesis-vicky.info/. Enter Y624 in the search box to learn more about \"Hives: Care Instructions. \"  Current as of: March 20, 2017  Content Version: 11.4  © 8667-3727 [employer]. Care instructions adapted under license by [employer] (which disclaims liability or warranty for this information). If you have questions about a medical condition or this instruction, always ask your healthcare professional. [address] any warranty or liability for your use of this information.

## 2018-02-15 NOTE — MR AVS SNAPSHOT
[address] [place] [address] [phone] Patient: Margaret Wells MRN: [id number] :1965 Visit Information Date & Time Provider Department Dept. Phone Encounter #  
 2/15/2018  4:00 PM Tadeo Alejandra MD [facility] [phone] [id number] Follow-up Instructions Return in about 6 months (around 8/15/2018) for HTN, chol  Fasting lab one week prior. 3/26/2018  8:00 AM  
REMOTE OFFICE VISIT with Dahiana Alan [facility] (GINGER SCHEDULING) Appt Note: [facility] icd, rc b 17  
 330 [facility] [address] [place] [address],[address] [facility] 7 [address] Upcoming Health Maintenance Date Due DTaP/Tdap/Td series (2 - Td) 12/10/2022 COLONOSCOPY 2027 Allergies as of 2/15/2018  Review Complete On: 2/15/2018 By: Tadeo Alejandra MD  
  
 Severity Noted Reaction Type Reactions Cephalexin  02/15/2018   Systemic Hives Current Immunizations  Reviewed on 2/15/2018 Name Date H1N1 FLU VACCINE 11/10/2009 Influenza Vaccine 2017, 10/6/2016, 10/5/2014 Influenza Vaccine Whole 10/6/2012 Pneumococcal Polysaccharide (PPSV-23) 2015 TD Vaccine 2004 Tdap 12/10/2012 ZZZ-RETIRED (DO NOT USE) Pneumococcal Vaccine (Unspecified Type) 2006 Reviewed by Mayco Robbins LPN on  at  4:19 PM  
You Were Diagnosed With   
  
 Codes Comments Urticarial dermatitis    -  Primary ICD-10-CM: L50.9 ICD-9-CM: 708.9 Hypercholesterolemia without hypertriglyceridemia     ICD-10-CM: E78.00 ICD-9-CM: 272.0 Hypertension, essential, benign     ICD-10-CM: I10 
ICD-9-CM: 401.1 Vitals BP Pulse Temp Resp Height(growth percentile) Weight(growth percentile)  125/73 (BP 1 Location: Left arm, BP Patient Position: Sitting) 70 97.6 °F (36.4 °C) (Oral) 20 5' 11\" (1.803 m) 216 lb (98 kg) SpO2 BMI Smoking Status 97% 30.13 kg/m2 Never Smoker BMI and BSA Data Body Mass Index Body Surface Area  
 30.13 kg/m 2 2.22 m 2 Preferred Pharmacy Pharmacy Name Phone [address] [phone] Your Updated Medication List  
  
   
This list is accurate as of: 2/15/18  5:27 PM.  Always use your most recent med list.  
  
  
  
  
 albuterol 90 mcg/actuation inhaler Commonly known as:  VENTOLIN HFA INHALE TWO PUFFS BY MOUTH EVERY 4 HOURS AS NEEDED FOR  WHEEZING  
  
 ALPRAZolam 0.5 mg tablet Commonly known as:  Margaretta Ditch Take 1 Tab by mouth two (2) times daily as needed for Anxiety. Max Daily Amount: 1 mg.  
  
 amoxicillin 500 mg capsule Commonly known as:  AMOXIL Prior to dental work  
  
 aspirin 325 mg tablet Commonly known as:  ASPIRIN Take 1 Tab by mouth daily. atorvastatin 40 mg tablet Commonly known as:  LIPITOR  
TAKE ONE TABLET BY MOUTH DAILY. CLARITIN 10 mg tablet Generic drug:  loratadine Take 10 mg by mouth.  
  
 enalapril 5 mg tablet Commonly known as:  Sabas Heaps Take 1 Tab by mouth two (2) times a day. flecainide 100 mg tablet Commonly known as:  TAMBOCOR Take 1 Tab by mouth two (2) times a day. metoprolol succinate 50 mg XL tablet Commonly known as:  TOPROL-XL  
TAKE ONE TABLET BY MOUTH ONCE DAILY  
  
 mometasone-formoterol 200-5 mcg/actuation HFA inhaler Commonly known as:  Devonda Spindale Take 2 Puffs by inhalation two (2) times a day. predniSONE 10 mg tablet Commonly known as:  Julita Savers Take as follows  Day 1-2: 6 tabs PO -  Day 3-4: 5 tabs PO -  Day 5-6: 4 tabs PO -  Day 7-8: 3 tabs PO -  Day 9-10: 2 tabs PO -  Day 11-12: 1 tab PO  Dispense 42 tabs ZyrTEC 10 mg Cap Generic drug:  Cetirizine Take  by mouth. Follow-up Instructions Return in about 6 months (around 8/15/2018) for HTN, chol  Fasting lab one week prior. To-Do List   
 08/15/2018 Lab:  LIPID PANEL Patient Instructions If rash returns with prednisone taper, can increase Zyrtec (cetirizine) to every 12 hours and Pepcid to every 12 hours while contacting dermatologist.  
.Office visit in 6 months with fasting lab work a week before visit. Hives: Care Instructions Your Care Instructions Hives are raised, red, itchy patches of skin. They are also called wheals or welts. They usually have red borders and pale centers. Hives range in size from ¼ inch to 3 inches or more across. They may seem to move from place to place on the skin. Several hives may form a large area of raised, red skin. You can get hives after an insect sting, after taking medicine or eating certain foods, or because of infection or stress. Other causes include plants, things you breathe in, makeup, heat, cold, sunlight, and latex. You cannot spread hives to other people. Hives may last a few minutes or a few days, but a single spot may last less than 36 hours. Follow-up care is a key part of your treatment and safety. Be sure to make and go to all appointments, and call your doctor if you are having problems. It's also a good idea to know your test results and keep a list of the medicines you take. How can you care for yourself at home? · Avoid whatever you think may have caused your hives, such as a certain food or medicine. However, you may not know the cause. · Put a cool, wet towel on the area to relieve itching. · Take an over-the-counter antihistamine, such as diphenhydramine (Benadryl), cetirizine (Zyrtec), or loratadine (Claritin), to help stop the hives and calm the itching. Read and follow directions on the label. These medicines can make you feel sleepy. Do not drive while using them. · Stay away from strong soaps, detergents, and chemicals.  These can make itching worse. When should you call for help? Call 911 anytime you think you may need emergency care. For example, call if: 
? · You have symptoms of a severe allergic reaction. These may include: 
¨ Sudden raised, red areas (hives) all over your body. ¨ Swelling of the throat, mouth, lips, or tongue. ¨ Trouble breathing. ¨ Passing out (losing consciousness). Or you may feel very lightheaded or suddenly feel weak, confused, or restless. ?Call your doctor now or seek immediate medical care if: 
? · You have symptoms of an allergic reaction, such as: ¨ A rash or hives (raised, red areas on the skin). ¨ Itching. ¨ Swelling. ¨ Belly pain, nausea, or vomiting. ? · You get hives after you start a new medicine. ? · Hives have not gone away after 24 hours. ? Watch closely for changes in your health, and be sure to contact your doctor if: 
? · You do not get better as expected. Where can you learn more? Go to http://genesis-vicky.info/. Enter B661 in the search box to learn more about \"Hives: Care Instructions. \" Current as of: March 20, 2017 Content Version: 11.4 © 4485-4570 [employer]. Care instructions adapted under license by [employer] (which disclaims liability or warranty for this information). If you have questions about a medical condition or this instruction, always ask your healthcare professional. [place] [address] any warranty or liability for your use of this information. Introducing [facility]! Dear Britney Ochoa: Thank you for requesting a [employer] account. Our records indicate that you already have an active [employer] account. You can access your account anytime at [email or web address] Medication [email or web address]/[employer] Did you know that you can access your hospital and ER discharge instructions at any time in [employer]? You can also review all of your test results from your hospital stay or ER visit. Additional Information If you have questions, please visit the Frequently Asked Questions section of the [employer] website at [email or web address] Remember, [employer] is NOT to be used for urgent needs. For medical emergencies, dial 911. Now available from your iPhone and Android! Please provide this summary of care documentation to your next provider. Your primary care clinician is listed as Nan Claros. If you have any questions after today's visit, please call [phone].

## 2018-03-08 DIAGNOSIS — J45.40 MODERATE PERSISTENT ASTHMA WITHOUT COMPLICATION: ICD-10-CM

## 2018-03-08 RX ORDER — MOMETASONE FUROATE AND FORMOTEROL FUMARATE DIHYDRATE 200; 5 UG/1; UG/1
AEROSOL RESPIRATORY (INHALATION)
Qty: 1 INHALER | Refills: 11 | Status: SHIPPED | OUTPATIENT
Start: 2018-03-08 | End: 2019-12-01 | Stop reason: SDUPTHER

## 2018-03-26 ENCOUNTER — OFFICE VISIT (OUTPATIENT)
Dept: CARDIOLOGY CLINIC | Age: 53
End: 2018-03-26

## 2018-03-26 DIAGNOSIS — Z95.810 PRESENCE OF AUTOMATIC CARDIOVERTER/DEFIBRILLATOR (AICD): Primary | ICD-10-CM

## 2018-04-13 DIAGNOSIS — E78.00 HYPERCHOLESTEROLEMIA WITHOUT HYPERTRIGLYCERIDEMIA: ICD-10-CM

## 2018-04-15 RX ORDER — ATORVASTATIN CALCIUM 40 MG/1
TABLET, FILM COATED ORAL
Qty: 30 TAB | Refills: 11 | Status: SHIPPED | OUTPATIENT
Start: 2018-04-15 | End: 2018-08-23 | Stop reason: SDUPTHER

## 2018-06-04 RX ORDER — FLECAINIDE ACETATE 100 MG/1
TABLET ORAL
Qty: 180 TAB | Refills: 0 | Status: SHIPPED | OUTPATIENT
Start: 2018-06-04 | End: 2018-08-27 | Stop reason: SDUPTHER

## 2018-06-04 NOTE — TELEPHONE ENCOUNTER
Request for flecainide 100mg twice a day. Last office visit 1/18/18, next office visit not scheduled. Refills per verbal order from Dr. He Rodriguez.

## 2018-06-20 RX ORDER — ENALAPRIL MALEATE 5 MG/1
TABLET ORAL
Qty: 180 TAB | Refills: 1 | Status: SHIPPED | OUTPATIENT
Start: 2018-06-20 | End: 2018-12-24 | Stop reason: SDUPTHER

## 2018-06-20 NOTE — TELEPHONE ENCOUNTER
Request for Enalapril 5 mg BID. Last office visit 1/18/18, next office visit not yet scheduled. Refills per verbal order from Dr. Evelyn Valles.

## 2018-07-02 ENCOUNTER — OFFICE VISIT (OUTPATIENT)
Dept: CARDIOLOGY CLINIC | Age: 53
End: 2018-07-02

## 2018-07-02 DIAGNOSIS — Z95.810 PRESENCE OF AUTOMATIC CARDIOVERTER/DEFIBRILLATOR (AICD): Primary | ICD-10-CM

## 2018-07-30 DIAGNOSIS — I48.0 PAROXYSMAL ATRIAL FIBRILLATION (HCC): ICD-10-CM

## 2018-07-30 RX ORDER — METOPROLOL SUCCINATE 50 MG/1
TABLET, EXTENDED RELEASE ORAL
Qty: 90 TAB | Refills: 1 | Status: SHIPPED | OUTPATIENT
Start: 2018-07-30 | End: 2019-01-27 | Stop reason: SDUPTHER

## 2018-07-30 NOTE — TELEPHONE ENCOUNTER
Request for metoprolol 50mg every day. Last office visit 1/18/18, next office visit 1/10/19. Refills per verbal order from Dr. Danny Danielson.

## 2018-08-23 ENCOUNTER — OFFICE VISIT (OUTPATIENT)
Dept: INTERNAL MEDICINE CLINIC | Facility: CLINIC | Age: 53
End: 2018-08-23

## 2018-08-23 VITALS
OXYGEN SATURATION: 95 % | BODY MASS INDEX: 30.85 KG/M2 | TEMPERATURE: 97.9 F | HEART RATE: 64 BPM | RESPIRATION RATE: 18 BRPM | WEIGHT: 220.4 LBS | DIASTOLIC BLOOD PRESSURE: 82 MMHG | HEIGHT: 71 IN | SYSTOLIC BLOOD PRESSURE: 122 MMHG

## 2018-08-23 DIAGNOSIS — F41.9 ANXIETY: ICD-10-CM

## 2018-08-23 DIAGNOSIS — E78.00 HYPERCHOLESTEROLEMIA WITHOUT HYPERTRIGLYCERIDEMIA: ICD-10-CM

## 2018-08-23 DIAGNOSIS — I10 HYPERTENSION, ESSENTIAL, BENIGN: Primary | ICD-10-CM

## 2018-08-23 RX ORDER — ATORVASTATIN CALCIUM 40 MG/1
TABLET, FILM COATED ORAL
Qty: 90 TAB | Refills: 3 | Status: SHIPPED | OUTPATIENT
Start: 2018-08-23 | End: 2019-09-22 | Stop reason: SDUPTHER

## 2018-08-23 RX ORDER — ALPRAZOLAM 0.5 MG/1
0.5 TABLET ORAL
Qty: 20 TAB | Refills: 0 | Status: SHIPPED | OUTPATIENT
Start: 2018-08-23 | End: 2020-11-09 | Stop reason: SDUPTHER

## 2018-08-23 NOTE — MR AVS SNAPSHOT
[address] [address] [phone] Patient: Moshe Gregory MRN: [id number] :1965 Visit Information Date & Time Provider Department Dept. Phone Encounter #  
 2018  2:30 PM Flora Williamson, [address] [facility] [address] [place] [address] [id number] Follow-up Instructions Return in about 1 year (around 2019), or if symptoms worsen or fail to improve, for w/Dr. PRECIADO for BP, XOL, (fasting labs 1 wk prior). Your Appointments 1/10/2019 11:00 AM  
PACEMAKER with [id number] [facility] (GINGER SCHEDULING) Appt Note: added ariela sci ICD/rc Carleen Herr annual b   (mailed 18) [address] [place]  
[address] [facility] [address]  
  
    
 1/10/2019 11:20 AM  
ESTABLISHED PATIENT with Zoila Rojas MD  
[facility] ([facility]) Appt Note: added ariela sci ICD/rc Carleen Herr annual b   (mailed 18) [address] [phone]  
[address] [phone]  
  
    
  
 10/3/2018  1:30 PM  
REMOTE OFFICE VISIT with Natasha Robertson [facility] (GINGER SCHEDULING) Appt Note: LAT ICD/rc b 18  (he has no schedule? ?? made and mailed. .cr)  
 [address] [place]  
[address] [phone] Upcoming Health Maintenance Date Due Influenza Age 5 to Adult 2018 DTaP/Tdap/Td series (2 - Td) 12/10/2022 COLONOSCOPY 2027 Allergies as of 2018  Review Complete On: 2018 By: Flora Williamson NP Severity Noted Reaction Type Reactions Cephalexin  02/15/2018   Systemic Hives Current Immunizations  Reviewed on 2018 Name Date H1N1 FLU VACCINE 11/10/2009 Influenza Vaccine 9/29/2017, 10/6/2016, 10/5/2014 Influenza Vaccine Whole 10/6/2012 Pneumococcal Polysaccharide (PPSV-23) 12/11/2015 TD Vaccine 8/1/2004 Tdap 12/10/2012 ZZZ-RETIRED (DO NOT USE) Pneumococcal Vaccine (Unspecified Type) 9/1/2006 Reviewed by Alicia Sosa LPN on 6/66/0146 at  2:22 PM  
You Were Diagnosed With   
  
 Codes Comments Hypertension, essential, benign    -  Primary ICD-10-CM: I10 
ICD-9-CM: 401.1 Hypercholesterolemia without hypertriglyceridemia     ICD-10-CM: E78.00 ICD-9-CM: 272.0 Anxiety     ICD-10-CM: F41.9 ICD-9-CM: 300.00 Vitals BP Pulse Temp Resp Height(growth percentile) Weight(growth percentile) 122/82 (BP 1 Location: Left arm, BP Patient Position: Sitting) 64 97.9 °F (36.6 °C) (Oral) 18 5' 11\" (1.803 m) 220 lb 6.4 oz (100 kg) SpO2 BMI Smoking Status 95% 30.74 kg/m2 Never Smoker Vitals History BMI and BSA Data Body Mass Index Body Surface Area 30.74 kg/m 2 2.24 m 2 Preferred Pharmacy Pharmacy Name Phone [address] [place] [address] [phone] Your Updated Medication List  
  
   
This list is accurate as of 8/23/18  2:54 PM.  Always use your most recent med list.  
  
  
  
  
 albuterol 90 mcg/actuation inhaler Commonly known as:  VENTOLIN HFA INHALE TWO PUFFS BY MOUTH EVERY 4 HOURS AS NEEDED FOR  WHEEZING  
  
 ALPRAZolam 0.5 mg tablet Commonly known as:  Shantel Fell Take 1 Tab by mouth two (2) times daily as needed for Anxiety. Max Daily Amount: 1 mg.  
  
 aspirin 325 mg tablet Commonly known as:  ASPIRIN Take 1 Tab by mouth daily. atorvastatin 40 mg tablet Commonly known as:  LIPITOR  
TAKE ONE TABLET BY MOUTH ONCE DAILY DULERA 200-5 mcg/actuation HFA inhaler Generic drug:  mometasone-formoterol INHALE TWO PUFFS BY MOUTH TWICE DAILY  
  
 enalapril 5 mg tablet Commonly known as:  Jonas Sharif  
 TAKE ONE TABLET BY MOUTH TWICE DAILY flecainide 100 mg tablet Commonly known as:  TAMBOCOR  
TAKE ONE TABLET BY MOUTH TWICE DAILY  
  
 metoprolol succinate 50 mg XL tablet Commonly known as:  TOPROL-XL  
TAKE 1 TABLET BY MOUTH ONCE DAILY ZyrTEC 10 mg Cap Generic drug:  Cetirizine Take  by mouth. Prescriptions Printed Refills ALPRAZolam (XANAX) 0.5 mg tablet 0 Sig: Take 1 Tab by mouth two (2) times daily as needed for Anxiety. Max Daily Amount: 1 mg. Class: Print Route: Oral  
  
Prescriptions Sent to Pharmacy Refills  
 atorvastatin (LIPITOR) 40 mg tablet 3 Sig: TAKE ONE TABLET BY MOUTH ONCE DAILY Class: Normal  
 Pharmacy: [facility] [address], [place] Ph #: [phone] Follow-up Instructions Return in about 1 year (around 8/23/2019), or if symptoms worsen or fail to improve, for w/Dr. PRECIADO for BP, XOL, (fasting labs 1 wk prior). To-Do List   
 08/01/2019 Lab:  LIPID PANEL   
  
 08/01/2019 Lab:  METABOLIC PANEL, COMPREHENSIVE Patient Instructions Well Visit, Men 48 to 72: Care Instructions Your Care Instructions Physical exams can help you stay healthy. Your doctor has checked your overall health and may have suggested ways to take good care of yourself. He or she also may have recommended tests. At home, you can help prevent illness with healthy eating, regular exercise, and other steps. Follow-up care is a key part of your treatment and safety. Be sure to make and go to all appointments, and call your doctor if you are having problems. It's also a good idea to know your test results and keep a list of the medicines you take. How can you care for yourself at home? · Reach and stay at a healthy weight. This will lower your risk for many problems, such as obesity, diabetes, heart disease, and high blood pressure. · Get at least 30 minutes of exercise on most days of the week. Walking is a good choice. You also may want to do other activities, such as running, swimming, cycling, or playing tennis or team sports. · Do not smoke. Smoking can make health problems worse. If you need help quitting, talk to your doctor about stop-smoking programs and medicines. These can increase your chances of quitting for good. · Protect your skin from too much sun. When you're outdoors from 10 a.m. to 4 p.m., stay in the shade or cover up with clothing and a hat with a wide brim. Wear sunglasses that block UV rays. Even when it's cloudy, put broad-spectrum sunscreen (SPF 30 or higher) on any exposed skin. · See a dentist one or two times a year for checkups and to have your teeth cleaned. · Wear a seat belt in the car. · Limit alcohol to 2 drinks a day. Too much alcohol can cause health problems. Follow your doctor's advice about when to have certain tests. These tests can spot problems early. · Cholesterol. Your doctor will tell you how often to have this done based on your overall health and other things that can increase your risk for heart attack and stroke. · Blood pressure. Have your blood pressure checked during a routine doctor visit. Your doctor will tell you how often to check your blood pressure based on your age, your blood pressure results, and other factors. · Prostate exam. Talk to your doctor about whether you should have a blood test (called a PSA test) for prostate cancer. Experts disagree on whether men should have this test. Some experts recommend that you discuss the benefits and risks of the test with your doctor. · Diabetes. Ask your doctor whether you should have tests for diabetes. · Vision. Some experts recommend that you have yearly exams for glaucoma and other age-related eye problems starting at age 48. · Hearing. Tell your doctor if you notice any change in your hearing.  You can have tests to find out how well you hear. · Colon cancer. You should begin tests for colon cancer at age 48. You may have one of several tests. Your doctor will tell you how often to have tests based on your age and risk. Risks include whether you already had a precancerous polyp removed from your colon or whether your parent, brother, sister, or child has had colon cancer. · Heart attack and stroke risk. At least every 4 to 6 years, you should have your risk for heart attack and stroke assessed. Your doctor uses factors such as your age, blood pressure, cholesterol, and whether you smoke or have diabetes to show what your risk for a heart attack or stroke is over the next 10 years. · Abdominal aortic aneurysm. Ask your doctor whether you should have a test to check for an aneurysm. You may need a test if you ever smoked or if your parent, brother, sister, or child has had an aneurysm. When should you call for help? Watch closely for changes in your health, and be sure to contact your doctor if you have any problems or symptoms that concern you. Where can you learn more? Go to http://genesis-vicky.info/. Enter Z218 in the search box to learn more about \"Well Visit, Men 48 to 72: Care Instructions. \" Current as of: Sandrine 10, 2017 Content Version: 11.7 © 3998-0176 [employer]. Care instructions adapted under license by [employer] (which disclaims liability or warranty for this information). If you have questions about a medical condition or this instruction, always ask your healthcare professional. [address] [address] any warranty or liability for your use of this information. Introducing [facility]! Dear [address]: Thank you for requesting a HomeRun account. Our records indicate that you already have an active HomeRun account. You can access your account anytime at [email or web address] Did you know that you can access your hospital and ER discharge instructions at any time in Roomtag? You can also review all of your test results from your hospital stay or ER visit. Additional Information If you have questions, please visit the Frequently Asked Questions section of the Roomtag website at [email or web address] Remember, Roomtag is NOT to be used for urgent needs. For medical emergencies, dial 911. Now available from your iPhone and Android! Please provide this summary of care documentation to your next provider. Your primary care clinician is listed as Cheng Stokes. If you have any questions after today's visit, please call [phone].

## 2018-08-23 NOTE — PROGRESS NOTES
Tony Casanova  Identified pt with two pt identifiers(name and ). Chief Complaint   Patient presents with    Blood Pressure Check    Cholesterol Problem       Reviewed record In preparation for visit and have obtained necessary documentation. Has info on advanced directive but has not filled them out. 1. Have you been to the ER, urgent care clinic or hospitalized since your last visit? No     2. Have you seen or consulted any other health care providers outside of the [address] [place] [address] since your last visit? Include any pap smears or colon screening. No    Vitals reviewed with provider.     Health Maintenance reviewed:     Health Maintenance Due   Topic    Influenza Age 5 to Adult    asur  Wt Readings from Last 3 Encounters:   18 220 lb 6.4 oz (100 kg)   02/15/18 216 lb (98 kg)   18 223 lb 1.7 oz (101.2 kg)   e in doing things Not at all   Feeling down, depressed, irritable, or hopeless Not at all   Total Score PHQ 2 0            No Help Needed    No Help Needed    No Help Needed            BP Readings from Last 3 Encounters:   18 122/82   02/15/18 125/73   18 129/55       No Help Needed    No Help Needed       Vitals:    18 1428   BP: 122/82   Pulse: 64   Resp: 18   Temp: 97.9 °F (36.6 °C)   TempSrc: Oral   SpO2: 95%   Weight: 220 lb 6.4 oz (100 kg)   Height: 5' 11\" (1.803 m)   PainSc:   0 - No pain                          Learning Assessment 2014   PRIMARY LEARNER Patient   PRIMARY LANGUAGE ENGLISH   LEARNER PREFERENCE PRIMARY READING     DEMONSTRATION     OTHER (COMMENT)   ANSWERED BY patient   RELATIONSHIP SELF

## 2018-08-23 NOTE — PATIENT INSTRUCTIONS

## 2018-08-23 NOTE — PROGRESS NOTES
HPI  Sheryle Goltz is a 48y.o. year old male patient of Betina Kulkarni MD who presents with c/o f/u HTN and XOL. Pt has history of has Hypercholesterolemia without hypertriglyceridemia, Hypertension, essential, benign, History of colonoscopy with polypectomy, Corrected transposition of great vessels, H/O complete atrioventricular block, Sinoatrial node dysfunction (HCC), Paroxsymal atrial fibrillation, Paroxysmal VT ([address] ICD (implantable cardioverter-defibrillator) in place, and Moderate persistent asthma without complication on his problem list.. HTN  Home BP: doesn't check at home  Medication regimen/ADR/missed doses: metoprolol 50mg, enalapril 5mg  Diet:avois salt  Exercise: tries to stay active  Weight: stable  Denies chest pain, chest pressure, or palpitations. Denies SOB, orthopnea, or PND. Denies HA, dizziness, blurred vision, or swelling. Denies N/V/D or abd pain. Denies F/C. Follows w/Dr. [facility], last saw 5 mos ago. Goes to pacemaker clinic every 3-6 months. C/o some sore throat, nasal discharge over last few days, thinks he's getting a cold. Sometimes gets cramps in his hands, thinks its from arthritis. Hyperlipidemia  Patient has history of hyperlipidemia that is being managed with lipitor 40mg. José Miguel Stuartedgars He has been taking his statin cholesterol medication regularly without side effects such as myalgias or upper abdominal pain, nausea or jaundice. Takes xanax for anxiety, last refill was 4/2017, requesting rx today. Doesn't take very often, only when gets really anxious with his breathing or his pacemaker. States overall anxiety is well controled.      Patient Active Problem List   Diagnosis Code    Hypercholesterolemia without hypertriglyceridemia E78.00    Hypertension, essential, benign I10    History of colonoscopy with polypectomy Z98.890, Z86.010    Corrected transposition of great vessels Q20.5    H/O complete atrioventricular block Z86.79    Sinoatrial node dysfunction ([facility]) I49.5    Paroxsymal atrial fibrillation I48.91    Paroxysmal VT ([facility]) I47.2    ICD (implantable cardioverter-defibrillator) in place Z95.810    Moderate persistent asthma without complication O40.80     Past Medical History:   Diagnosis Date    Anxiety     anxiety vs panic disorder    Arrhythmia     Atiral fibrillation, paroxysmal V tach    Asthma     Atrioventricular block, complete ([address]     Congenital heart problem     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  Pacemaker      Past Surgical History:   Procedure Laterality Date    CARDIAC CATHETERIZATION  9/13/2011    Hemodynamics - RA 5, RV 43/12 LVEDP12, RV sat 81% FA 98%, systemic ventricular function diminished, normal coronaries    COLONOSCOPY N/A 4/21/2017    COL[facility]COPY performed by [facility]ilar [facility] at [facility]  4/21/2017         HX APPENDECTOMY      HX HEART CATHETERIZATION      HX PACEMAKER  6/2008     [facility] [employer] # [id number].      Social History     Social History    Marital status:      Spouse name: N/A    Number of children: N/A    Years of education: N/A     Social History Main Topics    Smoking status: Never Smoker    Smokeless tobacco: Former User    Alcohol use 18.0 oz/week     12 Cans of beer, 18 Standard drinks or equivalent per week      Comment: 1-12 beers per week    Drug use: No    Sexual activity: Yes     Partners: Female     Other Topics Concern    None     Social History Narrative     Family History   Problem Relation Age of Onset    High Cholesterol Mother     COPD Mother     Diabetes Father     COPD Father     No Known Problems Sister     Heart Disease Other      Allergies Allergen Reactions    Cephalexin Hives       MEDICATIONS  Current Outpatient Prescriptions   Medication Sig    metoprolol succinate (TOPROL-XL) 50 mg XL tablet TAKE 1 TABLET BY MOUTH ONCE DAILY    enalapril (VASOTEC) 5 mg tablet TAKE ONE TABLET BY MOUTH TWICE DAILY    flecainide (TAMBOCOR) 100 mg tablet TAKE ONE TABLET BY MOUTH TWICE DAILY    atorvastatin (LIPITOR) 40 mg tablet TAKE ONE TABLET BY MOUTH ONCE DAILY    DULERA 200-5 mcg/actuation HFA inhaler INHALE TWO PUFFS BY MOUTH TWICE DAILY    Cetirizine (ZYRTEC) 10 mg cap Take  by mouth.  aspirin (ASPIRIN) 325 mg tablet Take 1 Tab by mouth daily.  ALPRAZolam (XANAX) 0.5 mg tablet Take 1 Tab by mouth two (2) times daily as needed for Anxiety. Max Daily Amount: 1 mg.  albuterol (VENTOLIN HFA) 90 mcg/actuation inhaler INHALE TWO PUFFS BY MOUTH EVERY 4 HOURS AS NEEDED FOR  WHEEZING     No current facility-administered medications for this visit. REVIEW OF SYSTEMS  Per HPI        Visit Vitals    /82 (BP 1 Location: Left arm, BP Patient Position: Sitting)    Pulse 64    Temp 97.9 °F (36.6 °C) (Oral)    Resp 18    Ht 5' 11\" (1.803 m)    Wt 220 lb 6.4 oz (100 kg)    SpO2 95%    BMI 30.74 kg/m2         General: Well-developed, well-nourished. In no distress. A&O x 3. Head: Normocephalic, atraumatic. Eyes: Conjunctiva clear. Pupils equal, round, reactive to light. Extraocular movements intact. Ears/Nose: TM's and ear canals normal bilaterally. Nares normal. Septum midline. Normal nasal mucosa. No drainage or sinus tenderness. Mouth/Throat: Lips, mucosa, and tongue normal. Oropharynx benign. Neck: Supple, symmetrical, trachea midline, no lymphadenopathy, no carotid bruits, no JVD, thyroid: not enlarged, symmetric, no tenderness/mass/nodules. Lungs: Clear to auscultation bilaterally. No crackles or wheezes. No use of accessory muscles. Speaks in full sentences without SOB. Chest Wall: No tenderness or deformity.   Heart: RRR, normal S1 and S2, no murmur, click, rub, or gallop. Abdomen: Soft, non-distended  Skin: No rashes or lesions. Neurovasc: No edema appreciated. Musculoskeletal: Gait normal. ROM normal at both knees and shoulders. Psychiatric: Normal mood and affect. Behavior is normal.       Lab Results   Component Value Date/Time    Sodium 136 01/28/2018 11:15 AM    Potassium 3.7 01/28/2018 11:15 AM    Chloride 101 01/28/2018 11:15 AM    CO2 27 01/28/2018 11:15 AM    Anion gap 8 01/28/2018 11:15 AM    Glucose 103 (H) 01/28/2018 11:15 AM    BUN 18 01/28/2018 11:15 AM    Creatinine 1.21 01/28/2018 11:15 AM    BUN/Creatinine ratio 15 01/28/2018 11:15 AM    GFR est AA >60 01/28/2018 11:15 AM    GFR est non-AA >60 01/28/2018 11:15 AM    Calcium 9.0 01/28/2018 11:15 AM    Bilirubin, total 0.6 01/28/2018 11:15 AM    AST (SGOT) 14 (L) 01/28/2018 11:15 AM    Alk. phosphatase 61 01/28/2018 11:15 AM    Protein, total 7.7 01/28/2018 11:15 AM    Albumin 3.8 01/28/2018 11:15 AM    Globulin 3.9 01/28/2018 11:15 AM    A-G Ratio 1.0 (L) 01/28/2018 11:15 AM    ALT (SGPT) 24 01/28/2018 11:15 AM     Lab Results   Component Value Date/Time    Cholesterol, total 154 08/13/2018 08:46 AM    HDL Cholesterol 51 08/13/2018 08:46 AM    LDL, calculated 93 08/13/2018 08:46 AM    VLDL, calculated 10 08/13/2018 08:46 AM    Triglyceride 52 08/13/2018 08:46 AM    CHOL/HDL Ratio 3.0 11/04/2009 09:05 AM         ASSESSMENT and PLAN  Diagnoses and all orders for this visit:    1. Hypertension, essential, benign  -     METABOLIC PANEL, COMPREHENSIVE; Future  -well controlled    2. Hypercholesterolemia without hypertriglyceridemia  -     atorvastatin (LIPITOR) 40 mg tablet; TAKE ONE TABLET BY MOUTH ONCE DAILY- 90 day supply  -     LIPID PANEL; Future  -well controlled, continue current regimen    3. Anxiety  -     ALPRAZolam (XANAX) 0.5 mg tablet; Take 1 Tab by mouth two (2) times daily as needed for Anxiety. Max Daily Amount: 1 mg.   - checked, no refills since Dr. Marty Carl prescribed 4/2017            Patient Instructions        Well Visit, Men 48 to 72: Care Instructions  Your Care Instructions    Physical exams can help you stay healthy. Your doctor has checked your overall health and may have suggested ways to take good care of yourself. He or she also may have recommended tests. At home, you can help prevent illness with healthy eating, regular exercise, and other steps. Follow-up care is a key part of your treatment and safety. Be sure to make and go to all appointments, and call your doctor if you are having problems. It's also a good idea to know your test results and keep a list of the medicines you take. How can you care for yourself at home? · Reach and stay at a healthy weight. This will lower your risk for many problems, such as obesity, diabetes, heart disease, and high blood pressure. · Get at least 30 minutes of exercise on most days of the week. Walking is a good choice. You also may want to do other activities, such as running, swimming, cycling, or playing tennis or team sports. · Do not smoke. Smoking can make health problems worse. If you need help quitting, talk to your doctor about stop-smoking programs and medicines. These can increase your chances of quitting for good. · Protect your skin from too much sun. When you're outdoors from 10 a.m. to 4 p.m., stay in the shade or cover up with clothing and a hat with a wide brim. Wear sunglasses that block UV rays. Even when it's cloudy, put broad-spectrum sunscreen (SPF 30 or higher) on any exposed skin. · See a dentist one or two times a year for checkups and to have your teeth cleaned. · Wear a seat belt in the car. · Limit alcohol to 2 drinks a day. Too much alcohol can cause health problems. Follow your doctor's advice about when to have certain tests. These tests can spot problems early. · Cholesterol.  Your doctor will tell you how often to have this done based on your overall health and other things that can increase your risk for heart attack and stroke. · Blood pressure. Have your blood pressure checked during a routine doctor visit. Your doctor will tell you how often to check your blood pressure based on your age, your blood pressure results, and other factors. · Prostate exam. Talk to your doctor about whether you should have a blood test (called a PSA test) for prostate cancer. Experts disagree on whether men should have this test. Some experts recommend that you discuss the benefits and risks of the test with your doctor. · Diabetes. Ask your doctor whether you should have tests for diabetes. · Vision. Some experts recommend that you have yearly exams for glaucoma and other age-related eye problems starting at age 48. · Hearing. Tell your doctor if you notice any change in your hearing. You can have tests to find out how well you hear. · Colon cancer. You should begin tests for colon cancer at age 48. You may have one of several tests. Your doctor will tell you how often to have tests based on your age and risk. Risks include whether you already had a precancerous polyp removed from your colon or whether your parent, brother, sister, or child has had colon cancer. · Heart attack and stroke risk. At least every 4 to 6 years, you should have your risk for heart attack and stroke assessed. Your doctor uses factors such as your age, blood pressure, cholesterol, and whether you smoke or have diabetes to show what your risk for a heart attack or stroke is over the next 10 years. · Abdominal aortic aneurysm. Ask your doctor whether you should have a test to check for an aneurysm. You may need a test if you ever smoked or if your parent, brother, sister, or child has had an aneurysm. When should you call for help? Watch closely for changes in your health, and be sure to contact your doctor if you have any problems or symptoms that concern you. Where can you learn more?   Go to http://genesis-vicky.info/. Enter X730 in the search box to learn more about \"Well Visit, Men 48 to 72: Care Instructions. \"  Current as of: Sandrine 10, 2017  Content Version: 11.7  © 7826-9210 [employer]. Care instructions adapted under license by [employer] (which disclaims liability or warranty for this information). If you have questions about a medical condition or this instruction, always ask your healthcare professional. [place] [address] any warranty or liability for your use of this information. Please keep your follow-up appointment with Pelon Valles MD.     Follow-up Disposition:  Return in about 1 year (around 8/23/2019), or if symptoms worsen or fail to improve, for w/Dr. PRECIADO for BP, XOL, (fasting labs 1 wk prior). Health Maintenance Due   Topic Date Due    Influenza Age 5 to Adult  08/01/2018       I have discussed the diagnosis with the patient and the intended plan as seen in the above orders. Patient is in agreement. The patient has received an after-visit summary and questions were answered concerning future plans. I have discussed medication side effects and warnings with the patient as well. Warning signs for the above conditions were discussed including when to call our office or go to the emergency room. The nurse provided the patient and/or family with advanced directive information if needed and encouraged the patient to provide a copy to the office when available.

## 2018-08-28 RX ORDER — FLECAINIDE ACETATE 100 MG/1
TABLET ORAL
Qty: 180 TAB | Refills: 0 | Status: SHIPPED | OUTPATIENT
Start: 2018-08-28 | End: 2018-11-26 | Stop reason: SDUPTHER

## 2018-08-28 NOTE — TELEPHONE ENCOUNTER
Request for Flecainide 100 mg BId. Last office visit 1/18/18, next office visit 1/10/19. Refills per verbal order from Dr. Mary Moore.

## 2018-10-03 ENCOUNTER — OFFICE VISIT (OUTPATIENT)
Dept: CARDIOLOGY CLINIC | Age: 53
End: 2018-10-03

## 2018-10-03 DIAGNOSIS — Z95.810 CARDIAC DEFIBRILLATOR IN SITU: Primary | ICD-10-CM

## 2018-11-27 RX ORDER — FLECAINIDE ACETATE 100 MG/1
TABLET ORAL
Qty: 180 TAB | Refills: 0 | Status: SHIPPED | OUTPATIENT
Start: 2018-11-27 | End: 2019-02-25 | Stop reason: SDUPTHER

## 2018-11-27 NOTE — TELEPHONE ENCOUNTER
Request for Flecainide 100 mg BID. Last office visit 1/18/18, next office visit 1/10/19. Refills per verbal order from Dr. Salvador Springer.

## 2018-12-24 RX ORDER — ENALAPRIL MALEATE 5 MG/1
TABLET ORAL
Qty: 180 TAB | Refills: 1 | Status: SHIPPED | OUTPATIENT
Start: 2018-12-24 | End: 2019-06-26 | Stop reason: SDUPTHER

## 2018-12-24 NOTE — TELEPHONE ENCOUNTER
Request for enalapril 5mg twice a day. Last office visit 1/18/18, next office visit 1/10/19. Refills per verbal order from Dr. Paula Salvador for Dr Salvador Springer.

## 2019-01-27 DIAGNOSIS — I48.0 PAROXYSMAL ATRIAL FIBRILLATION (HCC): ICD-10-CM

## 2019-01-28 RX ORDER — METOPROLOL SUCCINATE 50 MG/1
TABLET, EXTENDED RELEASE ORAL
Qty: 90 TAB | Refills: 0 | Status: SHIPPED | OUTPATIENT
Start: 2019-01-28 | End: 2019-04-29 | Stop reason: SDUPTHER

## 2019-01-28 NOTE — TELEPHONE ENCOUNTER
Request for metoprolol 50mg every day. Last office visit 1/18/18, next office visit patient cancelled. Refills per verbal order from Dr. Bijan Thomas.

## 2019-02-25 ENCOUNTER — TELEPHONE (OUTPATIENT)
Dept: CARDIOLOGY CLINIC | Age: 54
End: 2019-02-25

## 2019-02-25 RX ORDER — FLECAINIDE ACETATE 100 MG/1
TABLET ORAL
Qty: 180 TAB | Refills: 0 | OUTPATIENT
Start: 2019-02-25

## 2019-02-25 NOTE — TELEPHONE ENCOUNTER
Pt called stating his refill for Flecainide 100mg was denied and he wasn't sure on what to do. Pt stating he has about 7 days left on this medication.   Phone #[phone]  Thanks

## 2019-02-26 RX ORDER — FLECAINIDE ACETATE 100 MG/1
100 TABLET ORAL 2 TIMES DAILY
Qty: 180 TAB | Refills: 0 | Status: SHIPPED | OUTPATIENT
Start: 2019-02-26 | End: 2019-05-28 | Stop reason: SDUPTHER

## 2019-02-26 NOTE — TELEPHONE ENCOUNTER
Requested Prescriptions     Signed Prescriptions Disp Refills    flecainide (TAMBOCOR) 100 mg tablet 180 Tab 0     Sig: Take 1 Tab by mouth two (2) times a day.      Authorizing Provider: Piotr Kramer     Ordering User: Nicholas Daily   Date Time Provider Karan Destini   2/28/2019  3:30 PM [address] [place] [address], [id number] [facility]   5/21/2019 10:40 AM Chase David  [address]    8/19/2019  8:15 AM LAB [facility]   8/26/2019  2:30 PM Malka Peraza MD [facility]

## 2019-02-28 ENCOUNTER — CLINICAL SUPPORT (OUTPATIENT)
Dept: CARDIOLOGY CLINIC | Age: 54
End: 2019-02-28

## 2019-02-28 DIAGNOSIS — Z95.810 CARDIAC DEFIBRILLATOR IN PLACE: Primary | ICD-10-CM

## 2019-04-29 DIAGNOSIS — I48.0 PAROXYSMAL ATRIAL FIBRILLATION (HCC): ICD-10-CM

## 2019-04-29 RX ORDER — METOPROLOL SUCCINATE 50 MG/1
TABLET, EXTENDED RELEASE ORAL
Qty: 30 TAB | Refills: 0 | Status: SHIPPED | OUTPATIENT
Start: 2019-04-29 | End: 2019-05-28 | Stop reason: SDUPTHER

## 2019-04-29 NOTE — TELEPHONE ENCOUNTER
Requested Prescriptions     Signed Prescriptions Disp Refills    metoprolol succinate (TOPROL-XL) 50 mg XL tablet 30 Tab 0     Sig: TAKE 1 TABLET BY MOUTH ONCE DAILY     Authorizing Provider: KONG CARBAJAL     Ordering User: Luwanna Distance       Last office visit 1/18/18    Per Dr. Briones Sit   Date Time Provider [facility] Destini   5/21/2019 10:40 AM Todd Kee  [address]   5/21/2019 10:30 PM [address] [place] [address], [id number] [facility]   8/19/2019  8:15 AM LAB [facility] [address] [facility]   8/26/2019  2:30 PM [facility]

## 2019-05-21 ENCOUNTER — CLINICAL SUPPORT (OUTPATIENT)
Dept: CARDIOLOGY CLINIC | Age: 54
End: 2019-05-21

## 2019-05-21 ENCOUNTER — OFFICE VISIT (OUTPATIENT)
Dept: CARDIOLOGY CLINIC | Age: 54
End: 2019-05-21

## 2019-05-21 VITALS
BODY MASS INDEX: 31.5 KG/M2 | SYSTOLIC BLOOD PRESSURE: 100 MMHG | HEIGHT: 71 IN | DIASTOLIC BLOOD PRESSURE: 70 MMHG | RESPIRATION RATE: 16 BRPM | WEIGHT: 225 LBS | HEART RATE: 68 BPM | OXYGEN SATURATION: 98 %

## 2019-05-21 DIAGNOSIS — I47.29 PAROXYSMAL VT: ICD-10-CM

## 2019-05-21 DIAGNOSIS — I49.5 SINOATRIAL NODE DYSFUNCTION (HCC): ICD-10-CM

## 2019-05-21 DIAGNOSIS — Z86.79 H/O COMPLETE ATRIOVENTRICULAR BLOCK: ICD-10-CM

## 2019-05-21 DIAGNOSIS — Z51.81 ENCOUNTER FOR MONITORING FLECAINIDE THERAPY: ICD-10-CM

## 2019-05-21 DIAGNOSIS — Z79.899 ENCOUNTER FOR MONITORING FLECAINIDE THERAPY: ICD-10-CM

## 2019-05-21 DIAGNOSIS — Q20.5 L-TGA, LEVO-TRANSPOSITION OF GREAT ARTERIES WITH VENTRICULAR INVERSION: ICD-10-CM

## 2019-05-21 DIAGNOSIS — Z95.810 CARDIAC DEFIBRILLATOR IN SITU: Primary | ICD-10-CM

## 2019-05-21 DIAGNOSIS — I48.0 PAROXYSMAL ATRIAL FIBRILLATION (HCC): ICD-10-CM

## 2019-05-21 NOTE — PROGRESS NOTES
Cardiac Electrophysiology Office Note     Subjective:      Ryan Wilkes is a 48 y.o. male patient who presents today for follow up, is s/p [employer] dual chamber ICD (last gen change 10/06/2011, RV lead 10/06/2011, RA lead 05/31/1995). Device check today shows proper lead & generator function. Generator longevity estimated 3 yrs. No interim detections or discharges. Ventricular paced with underlying sinus rhythm on ECG today. QRSd 148 ms, consistent with previous. Currently on flecainide 100 mg po bid. States he has been doing well. He denies chest pain, palpitations, SOB, PND, orthopnea, lightheadedness, syncope, or edema. He does note some sensitivity/discomfort at ICD pocket site since doing some heavy lifting over his head. Believes he may have a bit of swelling at the site since then. Estimates that this has been ongoing since March. Previous:  Echo (01/18/2018): LVEF 45%, mild diffuse LV hypokinesis, paradoxical ventricular septal motion. RV functions as systemic ventricle receiving blood from LA through TV, apex thickened with mod hypokinesis, RVEF 45%, mild to mod RV dilation, mild RVH. Mild REED. Marked MAC, mild MR. Mild AR. Milt TR. In 2015, switched from flecainide to amiodarone, but had visual & neuro side effects, switched back to flecainide. He declined Tikosyn. History of physiologically corrected transposition of the great vessels, complete av block, paroxsymal atrial fibrillation and ventricular tachycardia s/p ICD placement 10/6/11 Shai Arzate. Previously followed by Dr. Karlo Torres, has since transferred back to [facility] office. Abnormal nuclear stress 2011 with cardiac cath 9/2011 that showed normal coronary arteries. Works as a persaud.     Patient Active Problem List    Diagnosis Date Noted    Moderate persistent asthma without complication 05/09/7067    ICD (implantable cardioverter-defibrillator) in place 08/12/2015   [facility] Paroxysmal VT ([facility] [address] 09/13/2011    Paroxsymal atrial fibrillation 09/30/2010    H/O complete atrioventricular block     Sinoatrial node dysfunction (HCC)     Hypercholesterolemia without hypertriglyceridemia 08/25/2010    Hypertension, essential, benign 08/25/2010    History of colonoscopy with polypectomy 08/25/2010    Corrected transposition of great vessels 08/25/2010     Current Outpatient Medications   Medication Sig Dispense Refill    metoprolol succinate (TOPROL-XL) 50 mg XL tablet TAKE 1 TABLET BY MOUTH ONCE DAILY 30 Tab 0    flecainide (TAMBOCOR) 100 mg tablet Take 1 Tab by mouth two (2) times a day. 180 Tab 0    enalapril (VASOTEC) 5 mg tablet TAKE 1 TABLET BY MOUTH TWICE DAILY 180 Tab 1    atorvastatin (LIPITOR) 40 mg tablet TAKE ONE TABLET BY MOUTH ONCE DAILY 90 Tab 3    ALPRAZolam (XANAX) 0.5 mg tablet Take 1 Tab by mouth two (2) times daily as needed for Anxiety. Max Daily Amount: 1 mg. 20 Tab 0    DULERA 200-5 mcg/actuation HFA inhaler INHALE TWO PUFFS BY MOUTH TWICE DAILY 1 Inhaler 11    Cetirizine (ZYRTEC) 10 mg cap Take  by mouth.  aspirin (ASPIRIN) 325 mg tablet Take 1 Tab by mouth daily. 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     Pacemaker      Past Surgical History:   Procedure Laterality Date    CARDIAC CATHETERIZATION  9/13/2011    Hemodynamics - RA 5, RV 43/12 LVEDP12, RV sat 81% FA 98%, systemic ventricular function diminished, normal coronaries    COLONOSCOPY N/A 4/21/2017    [facility]  4/21/2017         HX APPENDECTOMY      HX HEART CATHETERIZATION      HX PACEMAKER  6/2008     [facility] [employer] # [id number]. Family History   Problem Relation Age of Onset    High Cholesterol Mother     COPD Mother     Diabetes Father     COPD Father     No Known Problems Sister     Heart Disease Other      Social History     Tobacco Use    Smoking status: Never Smoker    Smokeless tobacco: Former User   Substance Use Topics    Alcohol use: Yes     Alcohol/week: 18.0 oz     Types: 12 Cans of beer, 18 Standard drinks or equivalent per week     Comment: 1-12 beers per week        Review of Systems:   Constitutional: Negative for fever, chills, weight loss, malaise/fatigue. HEENT: Negative for nosebleeds, vision changes. Respiratory: No cough, hemoptysis, sputum production, and  wheezing. Cardiovascular: Negative for chest pain, palpitations, orthopnea, claudication, leg swelling, syncope, and PND. Gastrointestinal: Negative for nausea, vomiting, diarrhea, constipation, blood in stool and melena. Genitourinary: Negative for dysuria, and hematuria. Musculoskeletal: Negative for myalgias, arthralgia. Skin: Negative for rash. Heme: Does not bleed or bruise easily. Neurological: Negative for speech change and focal weakness     Objective:     Visit Vitals  /70 (BP 1 Location: Left arm, BP Patient Position: Sitting)   Pulse 68   Resp 16   Ht 5' 11\" (1.803 m)   Wt 225 lb (102.1 kg)   SpO2 98%   BMI 31.38 kg/m²      Physical Exam:   Constitutional: well-developed and well-nourished. No distress. Head: Normocephalic and atraumatic. Eyes: Pupils are equal, round  Neck: supple. No JVD present. Cardiovascular: Normal rate, regular rhythm and 2/6 systolic murmur heard. Pulmonary/Chest: Lungs clear to auscultation bilaterally   Abdominal: Soft, no tenderness. Musculoskeletal: no edema. Neurological: alert, oriented. Skin: Skin is warm and dry. ICD incision small keloid scar, unremarkable site, slightly prominent along lateral edge. No swelling. Psychiatric: normal mood and affect. Behavior is normal. Judgment and thought content normal.      ECG: Ventricular pacing with atrial sensing sinus rhythm. QRSd 148 ms, consistent with previous. Assessment/Plan:       ICD-10-CM ICD-9-CM    1. Cardiac defibrillator in situ Z95.810 V45.02    2. Paroxysmal atrial fibrillation (HCC) I48.0 427.31 AMB POC EKG ROUTINE W/ 12 LEADS, INTER & REP   3. H/O complete atrioventricular block Z86.79 V12.59    4. Paroxysmal VT ([address] I47.2 427.1    5. Encounter for monitoring flecainide therapy Z51.81 V58.83     Z79.899 V58.69    6. Sinoatrial node dysfunction (HCC) I49.5 427.81    7. L-TGA, levo-transposition of great arteries with ventricular inversion Q20.5 745.12      Mr. Ortiz's dual chamber ICD check today shows proper lead & generator function, 3 yrs generator longevity. No interim detections or discharges. He reports some pocket discomfort, but site is unremarkable. May revise if worse    Continue flecainide 100 mg po bid. QRSd 148 ms, stable. Last echo done in 01/2018, showed LVEF 45%, stable. No new echo needed at this time. We talked about LVEF and flecainide before but he has been on for long time and no CHF    Continue Toprol XL & enalapril as previously prescribed. /70, but denies orthostatic lightheadedness. Continue  mg po daily. Remote device checks q 3 months, EP clinic follow up in 1 year. He will need ECG at that time to monitor QRS due to flecainide as well.   May need echo for LVEF next year     Follow-up and Dispositions    · Return in about 1 year (around 5/21/2020). Future Appointments   Date Time Provider Karan Georges   8/19/2019  8:15 AM LAB [facility] GINGER SCHED   8/26/2019  2:30 PM Alejandra Ko  [address] [place]   9/16/2019  1:30 PM REMOTE[address], [address]   12/9/2019 11:30 AM REMOTE1, [address]   3/18/2020  8:30 AM REMOTE1, [address]   6/4/2020  3:00 PM PACEMAKER3, [address]     Thank you for involving me in this patient's care and please call with further concerns or questions. Arturo Gutierrez M.D.   Electrophysiology/Cardiology  [facility] [place]  [facility], [address]  [phone]

## 2019-05-21 NOTE — PROGRESS NOTES
Cardiac Electrophysiology Office Note Subjective:  
  
Jazmine Magana is a 48 y.o. male patient who presents today for follow up, is s/p [employer] dual chamber ICD (last gen change 10/06/2011, RV lead 10/06/2011, RA lead 05/31/1995). Device check today shows proper lead & generator function. Generator longevity estimated 3 yrs. No interim detections or discharges. Ventricular paced with underlying sinus rhythm on ECG today. QRSd 148 ms, consistent with previous. Currently on flecainide 100 mg po bid. States he has been doing well. He denies chest pain, palpitations, SOB, PND, orthopnea, lightheadedness, syncope, or edema. He does note some sensitivity/discomfort at ICD pocket site since doing some heavy lifting over his head. Believes he may have a bit of swelling at the site since then. Estimates that this has been ongoing since March. Previous: 
Echo (01/18/2018): LVEF 45%, mild diffuse LV hypokinesis, paradoxical ventricular septal motion. RV functions as systemic ventricle receiving blood from LA through TV, apex thickened with mod hypokinesis, RVEF 45%, mild to mod RV dilation, mild RVH. Mild REED. Marked MAC, mild MR. Mild AR. Milt TR. In 2015, switched from flecainide to amiodarone, but had visual & neuro side effects, switched back to flecainide. He declined Tikosyn. 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 Previously followed by Dr. Lucas Snare Dr. Chalino Snatamaria, has since transferred back to [facility] office. Abnormal nuclear stress 2011 with cardiac cath 9/2011 that showed normal coronary arteries. Works as a persaud. Patient Active Problem List  
 Diagnosis Date Noted  Moderate persistent asthma without complication 06/51/6948  ICD (implantable cardioverter-defibrillator) in place 08/12/2015  Paroxysmal VT ([facility] [address] 09/13/2011  Paroxsymal atrial fibrillation 09/30/2010  
 H/O complete atrioventricular block  Sinoatrial node dysfunction (HCC)  Hypercholesterolemia without hypertriglyceridemia 08/25/2010  Hypertension, essential, benign 08/25/2010  
 History of colonoscopy with polypectomy 08/25/2010  Corrected transposition of great vessels 08/25/2010 Current Outpatient Medications Medication Sig Dispense Refill  metoprolol succinate (TOPROL-XL) 50 mg XL tablet TAKE 1 TABLET BY MOUTH ONCE DAILY 30 Tab 0  
 flecainide (TAMBOCOR) 100 mg tablet Take 1 Tab by mouth two (2) times a day. 180 Tab 0  
 enalapril (VASOTEC) 5 mg tablet TAKE 1 TABLET BY MOUTH TWICE DAILY 180 Tab 1  
 atorvastatin (LIPITOR) 40 mg tablet TAKE ONE TABLET BY MOUTH ONCE DAILY 90 Tab 3  ALPRAZolam (XANAX) 0.5 mg tablet Take 1 Tab by mouth two (2) times daily as needed for Anxiety. Max Daily Amount: 1 mg. 20 Tab 0  
 DULERA 200-5 mcg/actuation HFA inhaler INHALE TWO PUFFS BY MOUTH TWICE DAILY 1 Inhaler 11  Cetirizine (ZYRTEC) 10 mg cap Take  by mouth.  aspirin (ASPIRIN) 325 mg tablet Take 1 Tab by mouth daily. 90 Tab 3  
 albuterol (VENTOLIN HFA) 90 mcg/actuation inhaler INHALE TWO PUFFS BY MOUTH EVERY 4 HOURS AS NEEDED FOR  WHEEZING 1 Inhaler 2 Allergies Allergen Reactions  Cephalexin Hives Past Medical History:  
Diagnosis Date  Anxiety   
 anxiety vs panic disorder  Arrhythmia Atiral fibrillation, paroxysmal V tach  Asthma  Atrioventricular block, complete (HCC)  Congenital heart problem   
 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   
 patient denies, BP meds for A-fib  ICD (implantable cardiac defibrillator) in place 6/2008 [place] # [id number].  Pacemaker Past Surgical History: Procedure Laterality Date  CARDIAC CATHETERIZATION  9/13/2011 Hemodynamics - RA 5, RV 43/12 LVEDP12, RV sat 81% FA 98%, systemic ventricular function diminished, normal coronaries  COLONOSCOPY N/A 4/21/2017 COLONOSCOPY performed by Gila Estrada [facility] at [facility] ENDOSCOPY  COLONOSCOPY,DIAGNOSTIC  4/21/2017  HX APPENDECTOMY  HX HEART CATHETERIZATION    
 HX PACEMAKER  6/2008 [employer] # [id number]. Family History Problem Relation Age of Onset  High Cholesterol Mother  COPD Mother  Diabetes Father  COPD Father  No Known Problems Sister  Heart Disease Other Social History Tobacco Use  Smoking status: Never Smoker  Smokeless tobacco: Former User Substance Use Topics  Alcohol use: Yes Alcohol/week: 18.0 oz Types: 12 Cans of beer, 18 Standard drinks or equivalent per week Comment: 1-12 beers per week Review of Systems:  
Constitutional: Negative for fever, chills, weight loss, malaise/fatigue. HEENT: Negative for nosebleeds, vision changes. Respiratory: No cough, hemoptysis, sputum production, and  wheezing. Cardiovascular: Negative for chest pain, palpitations, orthopnea, claudication, leg swelling, syncope, and PND. Gastrointestinal: Negative for nausea, vomiting, diarrhea, constipation, blood in stool and melena. Genitourinary: Negative for dysuria, and hematuria. Musculoskeletal: Negative for myalgias, arthralgia. Skin: Negative for rash. Heme: Does not bleed or bruise easily. Neurological: Negative for speech change and focal weakness Objective:  
 
Visit Vitals /70 (BP 1 Location: Left arm, BP Patient Position: Sitting) Pulse 68 Resp 16 Ht 5' 11\" (1.803 m) Wt 225 lb (102.1 kg) SpO2 98% BMI 31.38 kg/m² Physical Exam:  
Constitutional: well-developed and well-nourished. No distress. Head: Normocephalic and atraumatic. Eyes: Pupils are equal, round Neck: supple. No JVD present. Cardiovascular: Normal rate, regular rhythm and 2/6 systolic murmur heard. Pulmonary/Chest: Lungs clear to auscultation bilaterally Abdominal: Soft, no tenderness. Musculoskeletal: no edema. Neurological: alert, oriented. Skin: Skin is warm and dry. ICD incision small keloid scar, unremarkable site, slightly prominent along lateral edge. No swelling. Psychiatric: normal mood and affect. Behavior is normal. Judgment and thought content normal.   
 
ECG: Ventricular pacing with underlying sinus rhythm. QRSd 148 ms, consistent with previous. Assessment/Plan: ICD-10-CM ICD-9-CM 1. Paroxysmal atrial fibrillation (HCC) I48.0 427.31 AMB POC EKG ROUTINE W/ 12 LEADS, INTER & REP  
2. H/O complete atrioventricular block Z86.79 V12.59 3. Paroxysmal VT (HCC) I47.2 427.1 4. Encounter for monitoring flecainide therapy Z51.81 V58.83   
 Z79.899 V58.69   
5. Sinoatrial node dysfunction (HCC) I49.5 427.81   
 
Mr. Ortiz's dual chamber ICD check today shows proper lead & generator function, 3 yrs generator longevity. No interim detections or discharges. He reports some pocket discomfort, but site is unremarkable. Continue flecainide 100 mg po bid. QRSd 148 ms, stable. Last echo done in 01/2018, showed LVEF 45%, stable. No new echo needed at this time. Continue Toprol XL & enalapril as previously prescribed. /70, but denies orthostatic lightheadedness. Continue  mg po daily. Remote device checks q 3 months, EP clinic follow up in 1 year. He will need ECG at that time to monitor QRS due to flecainide as well. Future Appointments Date Time Provider Karan Georges 8/19/2019  8:15 AM LAB [facility] BSIMA GINGER SCHED  
8/26/2019  2:30 PM Michael Patel  [address] [place]  
9/16/2019  1:30 PM REMOTE1, [address]  
12/9/2019 11:30 AM REMOTE1, [address]  
 3/18/2020  8:30 AM REMOTE1, [address]  
6/4/2020  3:00 PM PACEMAKER3, [address] Thank you for involving me in this patient's care and please call with further concerns or questions. Jack Mirza M.D. Electrophysiology/Cardiology [facility] [place] [facility] [address], [facility] [address] [place] [address] 
[phone]

## 2019-05-28 DIAGNOSIS — I48.0 PAROXYSMAL ATRIAL FIBRILLATION (HCC): ICD-10-CM

## 2019-05-28 RX ORDER — FLECAINIDE ACETATE 100 MG/1
TABLET ORAL
Qty: 180 TAB | Refills: 0 | Status: SHIPPED | OUTPATIENT
Start: 2019-05-28 | End: 2019-08-29 | Stop reason: SDUPTHER

## 2019-05-28 RX ORDER — METOPROLOL SUCCINATE 50 MG/1
TABLET, EXTENDED RELEASE ORAL
Qty: 30 TAB | Refills: 0 | Status: SHIPPED | OUTPATIENT
Start: 2019-05-28 | End: 2019-06-26 | Stop reason: SDUPTHER

## 2019-06-26 DIAGNOSIS — I48.0 PAROXYSMAL ATRIAL FIBRILLATION (HCC): ICD-10-CM

## 2019-06-27 RX ORDER — METOPROLOL SUCCINATE 50 MG/1
TABLET, EXTENDED RELEASE ORAL
Qty: 90 TAB | Refills: 1 | Status: SHIPPED | OUTPATIENT
Start: 2019-06-27 | End: 2019-12-23 | Stop reason: SDUPTHER

## 2019-06-27 RX ORDER — ENALAPRIL MALEATE 5 MG/1
TABLET ORAL
Qty: 180 TAB | Refills: 1 | Status: SHIPPED | OUTPATIENT
Start: 2019-06-27 | End: 2020-01-02

## 2019-06-27 NOTE — TELEPHONE ENCOUNTER
Request for Toprol 50 mg daily and Enalapril 5 mg BID. Last office visit 5/21/19, next office visit not yet scheduled. Refills per verbal order from Dr. Jyoti Zambrano.

## 2019-08-05 NOTE — TELEPHONE ENCOUNTER
Pharmacy verified.   [phone]  SK
Request for enalapril 5mg twice a day. Last office visit 5/22/17, next office visit 1/18/18. Refills per verbal order from Dr. Kwan Grimaldo.
: Yes

## 2019-08-16 DIAGNOSIS — I10 HYPERTENSION, ESSENTIAL, BENIGN: ICD-10-CM

## 2019-08-16 DIAGNOSIS — E78.00 HYPERCHOLESTEROLEMIA WITHOUT HYPERTRIGLYCERIDEMIA: ICD-10-CM

## 2019-08-20 LAB
ALBUMIN SERPL-MCNC: 4.4 G/DL (ref 3.5–5.5)
ALBUMIN/GLOB SERPL: 2.2 {RATIO} (ref 1.2–2.2)
ALP SERPL-CCNC: 57 IU/L (ref 39–117)
ALT SERPL-CCNC: 21 IU/L (ref 0–44)
AST SERPL-CCNC: 17 IU/L (ref 0–40)
BILIRUB SERPL-MCNC: 0.4 MG/DL (ref 0–1.2)
BUN SERPL-MCNC: 12 MG/DL (ref 6–24)
BUN/CREAT SERPL: 10 (ref 9–20)
CALCIUM SERPL-MCNC: 8.9 MG/DL (ref 8.7–10.2)
CHLORIDE SERPL-SCNC: 104 MMOL/L (ref 96–106)
CHOLEST SERPL-MCNC: 169 MG/DL (ref 100–199)
CO2 SERPL-SCNC: 21 MMOL/L (ref 20–29)
CREAT SERPL-MCNC: 1.17 MG/DL (ref 0.76–1.27)
GLOBULIN SER CALC-MCNC: 2 G/DL (ref 1.5–4.5)
GLUCOSE SERPL-MCNC: 106 MG/DL (ref 65–99)
HDLC SERPL-MCNC: 52 MG/DL
LDLC SERPL CALC-MCNC: 100 MG/DL (ref 0–99)
POTASSIUM SERPL-SCNC: 4.4 MMOL/L (ref 3.5–5.2)
PROT SERPL-MCNC: 6.4 G/DL (ref 6–8.5)
SODIUM SERPL-SCNC: 140 MMOL/L (ref 134–144)
TRIGL SERPL-MCNC: 84 MG/DL (ref 0–149)
VLDLC SERPL CALC-MCNC: 17 MG/DL (ref 5–40)

## 2019-08-25 NOTE — PROGRESS NOTES
HISTORY OF PRESENT ILLNESS  Artemio Joe is a 47 y.o. male. HPI  He presents for follow up of hypertension, hyperlipidemia. 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 IThere have been no AICD shocks. Diet and Lifestyle: generally follows a low fat low cholesterol diet, generally follows a low sodium diet, exercises sporadically, nonsmoker  Medication compliance: compliant all of the time  Medication side effects: none  Home BP Monitorin's/80's  Cardiovascular ROS:  He complains of palpitations, dyspnea on exertion. Both chronic and unchanged. He denies orthopnea, exertional chest pressure/discomfort, claudication, lower extremity edema, dizziness     He is being seen for follow up of asthma. He has occasional wheezing and cough that are execise related. He is  taking medications as instructed, no medication side effects noted, no significant ongoing wheezing or shortness of breath, using bronchodilator MDI less than twice a week.      Uses rescue inhaler or nebulizer:1  times /week      Patient Active Problem List   Diagnosis Code    Hypercholesterolemia without hypertriglyceridemia E78.00    Hypertension, essential, benign I10    History of colonoscopy with polypectomy Z98.890, Z86.010    Corrected transposition of great vessels Q20.5    H/O complete atrioventricular block Z86.79    Sinoatrial node dysfunction (HCC) I49.5    Paroxsymal atrial fibrillation I48.91    Paroxysmal VT ([address] I47.2    ICD (implantable cardioverter-defibrillator) in place Z95.810    Moderate persistent asthma without complication U54.86     Past Medical History:   Diagnosis Date    Anxiety     anxiety vs panic disorder    Arrhythmia     Atiral fibrillation, paroxysmal V tach    Asthma     Atrioventricular block, complete (HCC)     Congenital heart problem     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  Pacemaker      Past Surgical History:   Procedure Laterality Date    CARDIAC CATHETERIZATION  9/13/2011    Hemodynamics - RA 5, RV 43/12 LVEDP12, RV sat 81% FA 98%, systemic ventricular function diminished, normal coronaries    COLONOSCOPY N/A 4/21/2017    COL[facility]  4/21/2017         HX APPENDECTOMY      HX HEART CATHETERIZATION      HX PACEMAKER  6/2008     [facility] [employer] # [id number]. Social History     Socioeconomic History    Marital status:      Spouse name: Not on file    Number of children: Not on file    Years of education: Not on file    Highest education level: Not on file   Tobacco Use    Smoking status: Never Smoker    Smokeless tobacco: Former User   Substance and Sexual Activity    Alcohol use:  Yes     Alcohol/week: 30.0 standard drinks     Types: 12 Cans of beer, 18 Standard drinks or equivalent per week     Comment: 1-12 beers per week    Drug use: No    Sexual activity: Yes     Partners: Female     Family History   Problem Relation Age of Onset    High Cholesterol Mother     COPD Mother     Diabetes Father     COPD Father     No Known Problems Sister     Heart Disease Other      Allergies   Allergen Reactions    Cephalexin Hives     Current Outpatient Medications   Medication Sig Dispense Refill    enalapril (VASOTEC) 5 mg tablet TAKE 1 TABLET BY MOUTH TWICE DAILY 180 Tab 1    metoprolol succinate (TOPROL-XL) 50 mg XL tablet TAKE 1 TABLET BY MOUTH ONCE DAILY 90 Tab 1    flecainide (TAMBOCOR) 100 mg tablet TAKE 1 TABLET BY MOUTH TWICE DAILY 180 Tab 0    atorvastatin (LIPITOR) 40 mg tablet TAKE ONE TABLET BY MOUTH ONCE DAILY 90 Tab 3    ALPRAZolam (XANAX) 0.5 mg tablet Take 1 Tab by mouth two (2) times daily as needed for Anxiety. Max Daily Amount: 1 mg. 20 Tab 0    DULERA 200-5 mcg/actuation HFA inhaler INHALE TWO PUFFS BY MOUTH TWICE DAILY 1 Inhaler 11    Cetirizine (ZYRTEC) 10 mg cap Take  by mouth.  aspirin (ASPIRIN) 325 mg tablet Take 1 Tab by mouth daily. 90 Tab 3    albuterol (VENTOLIN HFA) 90 mcg/actuation inhaler INHALE TWO PUFFS BY MOUTH EVERY 4 HOURS AS NEEDED FOR  WHEEZING 1 Inhaler 2       Review of Systems   Constitutional: Negative for malaise/fatigue and weight loss. Gastrointestinal: Negative for constipation and diarrhea. Musculoskeletal: Positive for joint pain (fingers, knees, feet). Negative for back pain. Neurological: Negative for tingling and focal weakness. Visit Vitals  /65 (BP 1 Location: Left arm, BP Patient Position: Sitting)   Pulse 66   Temp 98 °F (36.7 °C) (Oral)   Resp 12   Ht 5' 11\" (1.803 m)   Wt 224 lb (101.6 kg)   SpO2 95%   BMI 31.24 kg/m²     Physical Exam   Constitutional: He is oriented to person, place, and time. He appears well-developed and well-nourished. HENT:   Head: Normocephalic and atraumatic. Eyes: Pupils are equal, round, and reactive to light. Conjunctivae are normal.   Neck: Neck supple. Carotid bruit is not present. No thyromegaly present. Cardiovascular: Normal rate and regular rhythm. PMI is not displaced. Exam reveals no gallop. Murmur heard. Systolic murmur is present. Pulses:       Dorsalis pedis pulses are 2+ on the right side, and 2+ on the left side. Posterior tibial pulses are 2+ on the right side, and 2+ on the left side. Pulmonary/Chest: Effort normal. He has no wheezes. He has no rhonchi. He has no rales. Abdominal: Soft. Normal appearance. He exhibits no abdominal bruit and no mass.  There is no hepatosplenomegaly. There is no tenderness. Musculoskeletal: He exhibits no edema. Lymphadenopathy:     He has no cervical adenopathy. Right: No supraclavicular adenopathy present. Left: No supraclavicular adenopathy present. Neurological: He is alert and oriented to person, place, and time. No sensory deficit. Skin: Skin is warm, dry and intact. No rash noted. Psychiatric: He has a normal mood and affect. His behavior is normal.   Nursing note and vitals reviewed. Results for orders placed or performed in visit on 08/26/19   AMB POC HEMOGLOBIN A1C   Result Value Ref Range    Hemoglobin A1c (POC) 5.0 %     Results for orders placed or performed in visit on 92/15/92   METABOLIC PANEL, COMPREHENSIVE   Result Value Ref Range    Glucose 106 (H) 65 - 99 mg/dL    BUN 12 6 - 24 mg/dL    Creatinine 1.17 0.76 - 1.27 mg/dL    GFR est non-AA 70 >59 mL/min/1.73    GFR est AA 81 >59 mL/min/1.73    BUN/Creatinine ratio 10 9 - 20    Sodium 140 134 - 144 mmol/L    Potassium 4.4 3.5 - 5.2 mmol/L    Chloride 104 96 - 106 mmol/L    CO2 21 20 - 29 mmol/L    Calcium 8.9 8.7 - 10.2 mg/dL    Protein, total 6.4 6.0 - 8.5 g/dL    Albumin 4.4 3.5 - 5.5 g/dL    GLOBULIN, TOTAL 2.0 1.5 - 4.5 g/dL    A-G Ratio 2.2 1.2 - 2.2    Bilirubin, total 0.4 0.0 - 1.2 mg/dL    Alk. phosphatase 57 39 - 117 IU/L    AST (SGOT) 17 0 - 40 IU/L    ALT (SGPT) 21 0 - 44 IU/L   LIPID PANEL   Result Value Ref Range    Cholesterol, total 169 100 - 199 mg/dL    Triglyceride 84 0 - 149 mg/dL    HDL Cholesterol 52 >39 mg/dL    VLDL, calculated 17 5 - 40 mg/dL    LDL, calculated 100 (H) 0 - 99 mg/dL     Echo (01/18/2018): LVEF 45%, mild diffuse LV hypokinesis, paradoxical ventricular septal motion. RV functions as systemic ventricle receiving blood from LA through TV, apex thickened with mod hypokinesis, RVEF 45%, mild to mod RV dilation, mild RVH. Mild REED.  marked mitral annular calcification, mild MR. Mild AR. Mild TR. ASSESSMENT and PLAN    ICD-10-CM ICD-9-CM    1. Hypertension, essential, benign I10 401.1    2. Hypercholesterolemia without hypertriglyceridemia E78.00 272.0    3. Moderate persistent asthma without complication B64.26 900.00    4. Paroxysmal atrial fibrillation (HCC) I48.0 427.31    5. Paroxysmal VT ([address] I47.2 427.1    6. ICD (implantable cardioverter-defibrillator) in place Z95.810 V45.02    7. Corrected transposition of great vessels Q20.5 745.12    8. Hyperglycemia R73.9 790.29 AMB POC HEMOGLOBIN A1C   9. Screening for prostate cancer Z12.5 V76.44 PSA SCREENING (SCREENING)     Diagnoses and all orders for this visit:    1. Hypertension, essential, benign  Hypertension is controlled. 2. Hypercholesterolemia without hypertriglyceridemia  Hyperlipidemia is controlled    3. Moderate persistent asthma without complication  Well controlled. 4. Paroxysmal atrial fibrillation (HCC)  Stable with no episodes seen during device checks. 5. Paroxysmal VT (HCC)  Stable on flecainide. 6. ICD (implantable cardioverter-defibrillator) in place  No shocks delivered. 7. Corrected transposition of great vessels  Physiologic correction. Anatomic abnormalities present. 8. Hyperglycemia  A1c is normal.   -     AMB POC HEMOGLOBIN A1C    9. Screening for prostate cancer  -     PSA SCREENING (SCREENING)      Follow-up and Dispositions    · Return in about 6 months (around 2/26/2020) for HTN, chol, asthma.       lab results and schedule of future lab studies reviewed with patient  reviewed diet, exercise and weight control  I have discussed the diagnosis, evaluation and treatment options and the intended plan with the patient. Patient understands and is in agreement. The patient has received an after-visit summary and questions were answered concerning future plans. I have discussed side effects and warnings of any new medications with the patient as well.

## 2019-08-25 NOTE — PATIENT INSTRUCTIONS
Remember to get your flu shot in September or October. You may call us for a nurse appointment or get this from your pharmacy. Office visit in 6 months              Prostate-Specific Antigen (PSA) Test: About This Test  What is it? A prostate-specific antigen (PSA) test measures the amount of PSA in your blood. PSA is released by a man's prostate gland into his blood. A high PSA level may mean that you have an enlargement, infection, or cancer of the prostate. Why is this test done? You may have this test to:  · Check for prostate cancer. · Watch prostate cancer and see if treatment is working. How can you prepare for the test?  Do not ejaculate during the 2 days before your PSA blood test, either during sex or masturbation. What happens before the test?  Tell your doctor if you have had a:  · Test to look at your bladder (cystoscopy) in the past several weeks. · Prostate biopsy in the past several weeks. · Prostate infection or urinary tract infection that has not gone away. · Tube (catheter) inserted into your bladder to drain urine recently. What happens during the test?  A health professional takes a sample of your blood. What happens after the test?  You can go back to your usual activities right away. When should you call for help? Watch closely for changes in your health, and be sure to contact your doctor if you have any questions about this test.  Follow-up care is a key part of your treatment and safety. Be sure to make and go to all appointments, and call your doctor if you are having problems. It's also a good idea to keep a list of the medicines you take. Ask your doctor when you can expect to have your test results. Where can you learn more? Go to http://genesis-vicky.info/.   Enter G526 in the search box to learn more about \"Prostate-Specific Antigen (PSA) Test: About This Test.\"  Current as of: December 19, 2018  Content Version: 12.1  © 9606-4167 [employer]wise, Incorporated. Care instructions adapted under license by [employer] (which disclaims liability or warranty for this information). If you have questions about a medical condition or this instruction, always ask your healthcare professional. [address] any warranty or liability for your use of this information.

## 2019-08-26 ENCOUNTER — OFFICE VISIT (OUTPATIENT)
Dept: INTERNAL MEDICINE CLINIC | Facility: CLINIC | Age: 54
End: 2019-08-26

## 2019-08-26 VITALS
HEART RATE: 66 BPM | DIASTOLIC BLOOD PRESSURE: 65 MMHG | RESPIRATION RATE: 12 BRPM | TEMPERATURE: 98 F | OXYGEN SATURATION: 95 % | BODY MASS INDEX: 31.36 KG/M2 | HEIGHT: 71 IN | SYSTOLIC BLOOD PRESSURE: 118 MMHG | WEIGHT: 224 LBS

## 2019-08-26 DIAGNOSIS — I48.0 PAROXYSMAL ATRIAL FIBRILLATION (HCC): ICD-10-CM

## 2019-08-26 DIAGNOSIS — I47.29 PAROXYSMAL VT: ICD-10-CM

## 2019-08-26 DIAGNOSIS — E78.00 HYPERCHOLESTEROLEMIA WITHOUT HYPERTRIGLYCERIDEMIA: ICD-10-CM

## 2019-08-26 DIAGNOSIS — Z12.5 SCREENING FOR PROSTATE CANCER: ICD-10-CM

## 2019-08-26 DIAGNOSIS — Z95.810 ICD (IMPLANTABLE CARDIOVERTER-DEFIBRILLATOR) IN PLACE: ICD-10-CM

## 2019-08-26 DIAGNOSIS — J45.40 MODERATE PERSISTENT ASTHMA WITHOUT COMPLICATION: ICD-10-CM

## 2019-08-26 DIAGNOSIS — Q20.5 CORRECTED TRANSPOSITION OF GREAT VESSELS: ICD-10-CM

## 2019-08-26 DIAGNOSIS — R73.9 HYPERGLYCEMIA: ICD-10-CM

## 2019-08-26 DIAGNOSIS — I10 HYPERTENSION, ESSENTIAL, BENIGN: Primary | ICD-10-CM

## 2019-08-26 LAB — HBA1C MFR BLD HPLC: 5 %

## 2019-08-26 NOTE — PROGRESS NOTES
Kristina Yeboah  Identified pt with two pt identifiers(name and ). Chief Complaint   Patient presents with    Hypertension    Cholesterol Problem       Reviewed record In preparation for visit and have obtained necessary documentation. Has info on advanced directive but has not filled them out. 1. Have you been to the ER, urgent care clinic or hospitalized since your last visit? No     2. Have you seen or consulted any other health care providers outside of the [address] [place] [address] since your last visit? Include any pap smears or colon screening. Cardiology     Vitals reviewed with provider. Health Maintenance reviewed:     Health Maintenance Due   Topic    Shingrix Vaccine Age 49> (1 of 2)    Influenza Age 5 to Adult           Wt Readings from Last 3 Encounters:   19 224 lb (101.6 kg)   19 225 lb (102.1 kg)   18 220 lb 6.4 oz (100 kg)        Temp Readings from Last 3 Encounters:   19 98 °F (36.7 °C) (Oral)   18 97.9 °F (36.6 °C) (Oral)   02/15/18 97.6 °F (36.4 °C) (Oral)        BP Readings from Last 3 Encounters:   19 118/65   19 100/70   18 122/82        Pulse Readings from Last 3 Encounters:   19 66   19 68   18 64        Vitals:    19 1439   BP: 118/65   Pulse: 66   Resp: 12   Temp: 98 °F (36.7 °C)   TempSrc: Oral   SpO2: 95%   Weight: 224 lb (101.6 kg)   Height: 5' 11\" (1.803 m)   PainSc:   0 - No pain          Learning Assessment:   :       Learning Assessment 2014   PRIMARY LEARNER Patient   PRIMARY LANGUAGE ENGLISH   LEARNER PREFERENCE PRIMARY READING     DEMONSTRATION     OTHER (COMMENT)   ANSWERED BY patient   RELATIONSHIP SELF        Depression Screening:   :       3 most recent PHQ Screens 2019   Little interest or pleasure in doing things Not at all   Feeling down, depressed, irritable, or hopeless Not at all   Total Score PHQ 2 0        Fall Risk Assessment:   :     No flowsheet data found. Abuse Screening:   :     No flowsheet data found.      ADL Screening:   :       ADL Assessment 2/18/2015   Feeding yourself No Help Needed   Getting from bed to chair No Help Needed   Getting dressed No Help Needed   Bathing or showering No Help Needed   Walk across the room (includes cane/walker) No Help Needed   Using the telphone No Help Needed   Taking your medications No Help Needed   Preparing meals No Help Needed   Managing money (expenses/bills) No Help Needed   Moderately strenuous housework (laundry) No Help Needed   Shopping for personal items (toiletries/medicines) No Help Needed   Shopping for groceries No Help Needed   Driving No Help Needed   Climbing a flight of stairs No Help Needed   Getting to places beyond walking distances No Help Needed

## 2019-08-27 LAB — PSA SERPL-MCNC: 2 NG/ML (ref 0–4)

## 2019-09-03 RX ORDER — FLECAINIDE ACETATE 100 MG/1
TABLET ORAL
Qty: 180 TAB | Refills: 1 | Status: SHIPPED | OUTPATIENT
Start: 2019-09-03 | End: 2020-03-02

## 2019-09-03 NOTE — TELEPHONE ENCOUNTER
Request for Flecainide 100 mg BID. Last office visit 5/21/19, next office visit not yet scheduled. Refills per verbal order from Dr. Jhonatan Bales.

## 2019-09-18 ENCOUNTER — OFFICE VISIT (OUTPATIENT)
Dept: CARDIOLOGY CLINIC | Age: 54
End: 2019-09-18

## 2019-09-18 DIAGNOSIS — Z95.810 AUTOMATIC IMPLANTABLE CARDIAC DEFIBRILLATOR IN SITU: Primary | ICD-10-CM

## 2019-09-22 DIAGNOSIS — E78.00 HYPERCHOLESTEROLEMIA WITHOUT HYPERTRIGLYCERIDEMIA: ICD-10-CM

## 2019-09-22 RX ORDER — ATORVASTATIN CALCIUM 40 MG/1
TABLET, FILM COATED ORAL
Qty: 90 TAB | Refills: 3 | Status: SHIPPED | OUTPATIENT
Start: 2019-09-22 | End: 2021-01-18

## 2019-12-18 ENCOUNTER — OFFICE VISIT (OUTPATIENT)
Dept: CARDIOLOGY CLINIC | Age: 54
End: 2019-12-18

## 2019-12-18 DIAGNOSIS — Z95.810 AUTOMATIC IMPLANTABLE CARDIAC DEFIBRILLATOR IN SITU: Primary | ICD-10-CM

## 2019-12-23 DIAGNOSIS — I48.0 PAROXYSMAL ATRIAL FIBRILLATION (HCC): ICD-10-CM

## 2019-12-23 RX ORDER — METOPROLOL SUCCINATE 50 MG/1
TABLET, EXTENDED RELEASE ORAL
Qty: 90 TAB | Refills: 1 | Status: SHIPPED | OUTPATIENT
Start: 2019-12-23 | End: 2020-06-05

## 2019-12-23 NOTE — TELEPHONE ENCOUNTER
Request for Toprol XL 50 mg daily. Last office visit 5/21/19, next office visit not yet scheduled. Refills per verbal order from Dr. Jakub Ashley.

## 2020-01-02 RX ORDER — ENALAPRIL MALEATE 5 MG/1
TABLET ORAL
Qty: 180 TAB | Refills: 0 | Status: SHIPPED | OUTPATIENT
Start: 2020-01-02 | End: 2020-03-24

## 2020-03-02 RX ORDER — FLECAINIDE ACETATE 100 MG/1
TABLET ORAL
Qty: 180 TAB | Refills: 0 | Status: SHIPPED | OUTPATIENT
Start: 2020-03-02 | End: 2020-06-05

## 2020-03-02 NOTE — TELEPHONE ENCOUNTER
Cardiologist: Dr. Arin Hunt    Last appt: 12/18/2019  Future Appointments   Date Time Provider Karan Georges   3/18/2020  8:30 AM REMOTE1, [address]   6/4/2020  3:00 PM PACEMAKER3, [address]   8/18/2020 10:20 AM Zoe Espinoza  [address]       Requested Prescriptions     Signed Prescriptions Disp Refills    flecainide (TAMBOCOR) 100 mg tablet 180 Tab 0     Sig: Take 1 tablet by mouth twice daily     Authorizing Provider: KONG CARBAJAL     Ordering User: KARTHIKEYAN Turcios         Refills VO per Dr. Arin Hunt.

## 2020-03-18 ENCOUNTER — OFFICE VISIT (OUTPATIENT)
Dept: CARDIOLOGY CLINIC | Age: 55
End: 2020-03-18

## 2020-03-18 DIAGNOSIS — Z95.810 AUTOMATIC IMPLANTABLE CARDIAC DEFIBRILLATOR IN SITU: Primary | ICD-10-CM

## 2020-03-24 RX ORDER — ENALAPRIL MALEATE 5 MG/1
TABLET ORAL
Qty: 180 TAB | Refills: 1 | Status: SHIPPED | OUTPATIENT
Start: 2020-03-24 | End: 2020-10-13

## 2020-03-24 NOTE — TELEPHONE ENCOUNTER
Request for Enalapril 5 mg BID. Last office visit 5/21/19, next office visit 8/18/20. Refills per verbal order from Dr. Wil Lima.

## 2020-05-05 ENCOUNTER — TELEPHONE (OUTPATIENT)
Dept: CARDIOLOGY CLINIC | Age: 55
End: 2020-05-05

## 2020-05-05 NOTE — TELEPHONE ENCOUNTER
Verified patient with two types of identifiers. Asked patient who took him out of work. Patient states that he took himself out of work due to the [employer] recommendations. Notified patient that since Dr. Eunice Pace did not take him about of work , unless there is a change in symptoms prior to pandemic there should be no restrictions on patient returning to work. Patient verbalized understanding and will call with any other questions.

## 2020-05-05 NOTE — TELEPHONE ENCOUNTER
Patient would like to know if he should or shouldn't go back to work because of Covid-19. He would like a letter stating whether he should or shouldn't depending on what the doctor says. Please advise.      Phone: [phone]

## 2020-05-14 ENCOUNTER — TELEPHONE (OUTPATIENT)
Dept: CARDIOLOGY CLINIC | Age: 55
End: 2020-05-14

## 2020-05-14 NOTE — TELEPHONE ENCOUNTER
Patient would like to speak with Ella Torres in regards to Dr Brady Altamirano recommendations during the coronavirus pandemic. He spoke with you sometime ago and would like to confirm that the information he received is still accurate.      Phone: [phone]

## 2020-05-14 NOTE — TELEPHONE ENCOUNTER
Verified patient with two types of identifiers. Patient states he is irritated that we said he could go back to work when Lalo had requested patient's stay at home with his cardiac history. Asked patient what he did for a living and patient said it does matter because he is getting paid anyway he just was upset.  Explained to patient that MD has not been taking people out of work solely based on [address]; however, I will verify with MD.

## 2020-06-03 DIAGNOSIS — I48.0 PAROXYSMAL ATRIAL FIBRILLATION (HCC): ICD-10-CM

## 2020-06-05 RX ORDER — FLECAINIDE ACETATE 100 MG/1
TABLET ORAL
Qty: 180 TAB | Refills: 0 | Status: SHIPPED | OUTPATIENT
Start: 2020-06-05 | End: 2020-08-31

## 2020-06-05 RX ORDER — METOPROLOL SUCCINATE 50 MG/1
TABLET, EXTENDED RELEASE ORAL
Qty: 90 TAB | Refills: 0 | Status: SHIPPED | OUTPATIENT
Start: 2020-06-05 | End: 2020-08-31

## 2020-06-22 ENCOUNTER — OFFICE VISIT (OUTPATIENT)
Dept: CARDIOLOGY CLINIC | Age: 55
End: 2020-06-22

## 2020-06-22 DIAGNOSIS — Z95.810 AUTOMATIC IMPLANTABLE CARDIAC DEFIBRILLATOR IN SITU: Primary | ICD-10-CM

## 2020-08-06 ENCOUNTER — TELEPHONE (OUTPATIENT)
Dept: CARDIOLOGY CLINIC | Age: 55
End: 2020-08-06

## 2020-08-06 NOTE — TELEPHONE ENCOUNTER
Patient states that he has dental work on 8/11 and was advised to call and get antibiotics prior.      Phone: [phone]

## 2020-08-07 RX ORDER — CLINDAMYCIN HYDROCHLORIDE 300 MG/1
600 CAPSULE ORAL ONCE
Qty: 2 CAP | Refills: 0 | Status: SHIPPED | OUTPATIENT
Start: 2020-08-07 | End: 2020-08-07

## 2020-08-07 NOTE — TELEPHONE ENCOUNTER
Pt is s/p surgery for transposition of great vessels. Allergy to cephalosporins. Order clindamycin 600 mg x 1 prior to dental work.

## 2020-08-07 NOTE — TELEPHONE ENCOUNTER
Verified patient with two types of identifiers. Notified patient of NP recommendations and verified pharmacy. Patient verbalized understanding and will call with any other questions.

## 2020-08-31 DIAGNOSIS — I48.0 PAROXYSMAL ATRIAL FIBRILLATION (HCC): ICD-10-CM

## 2020-08-31 RX ORDER — FLECAINIDE ACETATE 100 MG/1
TABLET ORAL
Qty: 180 TAB | Refills: 0 | Status: SHIPPED | OUTPATIENT
Start: 2020-08-31 | End: 2020-12-02

## 2020-08-31 RX ORDER — METOPROLOL SUCCINATE 50 MG/1
TABLET, EXTENDED RELEASE ORAL
Qty: 90 TAB | Refills: 0 | Status: SHIPPED | OUTPATIENT
Start: 2020-08-31 | End: 2020-12-14

## 2020-08-31 NOTE — TELEPHONE ENCOUNTER
Refills requested for Toprol XL 50 mg tabs & flecainide 100 mg tabs. Last ECG in 05/2019 showed QRSd approx 140 ms, but had AV paced rhythm. Upcoming appt in <3 mos. Refill authorized.     Future Appointments   Date Time Provider aKran Georges   11/19/2020 11:00 AM PACEMAKER3, JJ HO   11/19/2020 11:20 AM MD ROSE MARY Hernandez AMB

## 2020-09-03 ENCOUNTER — TELEPHONE (OUTPATIENT)
Dept: CARDIOLOGY CLINIC | Age: 55
End: 2020-09-03

## 2020-09-03 NOTE — TELEPHONE ENCOUNTER
Patient is concerned that his pacemaker is acting funny and would like to speak with someone in the pacemaker clinic. Please advise.     Phone: [phone]

## 2020-09-04 NOTE — TELEPHONE ENCOUNTER
Called pt. He said he felt some irregularity with his heart. Asked pt to send a remote transmission so that we can look at it. Pt said he is at work right now but will send it later this evening when he gets home. He said this irregularity that he feels comes and goes and is not consistent and that he feels fine right now. I told him we would review the transmission when we get back into the office on Tuesday. Pt verbalized understanding and had no further questions.

## 2020-09-08 NOTE — TELEPHONE ENCOUNTER
No events recorded on the day he called. NSR during his transmission, HR of 87 bpm.   Only 1 event noted on 6-25-20. EGM show NSVT , 8 seconds in duration ( 8 beats ) with Max V rate of 288 bpm.   He does have occasional PVC's - known.

## 2020-09-09 NOTE — TELEPHONE ENCOUNTER
Verified patient with two types of identifiers. Notified patient of device findings and MD recommendaitons. Patient states if he starts to have more episodes he will move forward with the loop recorder. Patient verbalized understanding and will call with any other questions.

## 2020-10-13 RX ORDER — ENALAPRIL MALEATE 5 MG/1
TABLET ORAL
Qty: 180 TAB | Refills: 0 | Status: SHIPPED | OUTPATIENT
Start: 2020-10-13 | End: 2021-01-11

## 2020-10-13 NOTE — TELEPHONE ENCOUNTER
Received refill request for enalapril 5 mg tabs. Refill authorized. Cr & K WNL on last check.     Future Appointments   Date Time Provider [facility]   11/9/2020  3:30 PM JACEY Grimes BS AMB   11/19/2020 11:00 AM PACEMAKER3, JJ BAZZI BS AMB   11/19/2020 11:20 AM MD ROSE MARY Vargas BS AMB

## 2020-11-09 ENCOUNTER — OFFICE VISIT (OUTPATIENT)
Dept: INTERNAL MEDICINE CLINIC | Age: 55
End: 2020-11-09
Payer: COMMERCIAL

## 2020-11-09 VITALS
TEMPERATURE: 98.4 F | SYSTOLIC BLOOD PRESSURE: 120 MMHG | HEIGHT: 71 IN | RESPIRATION RATE: 12 BRPM | HEART RATE: 66 BPM | WEIGHT: 224.5 LBS | OXYGEN SATURATION: 98 % | BODY MASS INDEX: 31.43 KG/M2 | DIASTOLIC BLOOD PRESSURE: 73 MMHG

## 2020-11-09 DIAGNOSIS — M25.641 STIFFNESS OF JOINTS OF BOTH HANDS: ICD-10-CM

## 2020-11-09 DIAGNOSIS — E78.00 HYPERCHOLESTEROLEMIA WITHOUT HYPERTRIGLYCERIDEMIA: Primary | ICD-10-CM

## 2020-11-09 DIAGNOSIS — I10 HYPERTENSION, ESSENTIAL, BENIGN: ICD-10-CM

## 2020-11-09 DIAGNOSIS — R73.9 HYPERGLYCEMIA: ICD-10-CM

## 2020-11-09 DIAGNOSIS — I48.0 PAROXYSMAL ATRIAL FIBRILLATION (HCC): ICD-10-CM

## 2020-11-09 DIAGNOSIS — Z12.5 SCREENING FOR PROSTATE CANCER: ICD-10-CM

## 2020-11-09 DIAGNOSIS — M79.641 BILATERAL HAND PAIN: ICD-10-CM

## 2020-11-09 DIAGNOSIS — F41.9 ANXIETY: ICD-10-CM

## 2020-11-09 DIAGNOSIS — M79.642 BILATERAL HAND PAIN: ICD-10-CM

## 2020-11-09 DIAGNOSIS — M25.642 STIFFNESS OF JOINTS OF BOTH HANDS: ICD-10-CM

## 2020-11-09 PROCEDURE — 99214 OFFICE O/P EST MOD 30 MIN: CPT | Performed by: NURSE PRACTITIONER

## 2020-11-09 RX ORDER — ALPRAZOLAM 0.5 MG/1
0.5 TABLET ORAL
Qty: 20 TAB | Refills: 0 | Status: SHIPPED | OUTPATIENT
Start: 2020-11-09 | End: 2021-12-09

## 2020-11-09 RX ORDER — CLINDAMYCIN HYDROCHLORIDE 150 MG/1
CAPSULE ORAL
COMMUNITY
Start: 2020-11-04 | End: 2020-11-11

## 2020-11-09 NOTE — PROGRESS NOTES
HPI  Lucero Granados is a 54y.o. year old male patient of Sherwin Quintana MD who presents with c/o APE. Pt has history of has Hypercholesterolemia without hypertriglyceridemia, Hypertension, essential, benign, History of colonoscopy with polypectomy, Corrected transposition of great vessels, H/O complete atrioventricular block, Sinoatrial node dysfunction (HCC), Paroxsymal atrial fibrillation, Paroxysmal VT ([address] ICD (implantable cardioverter-defibrillator) in place, and Moderate persistent asthma without complication on their problem list..    Pt here for annual fu. He follows with Cards Dr. Andrea Fletcher for AFIB, ICD, next appt in January. Pt c/o bilat hand pain, hands locking up, worse first thing in the am- fingers have always been doing it but worse lately. Doesn't take anything for pain. Pt is right handed. Used to be a persaud, works for [employer] now. Has noticed two nodules to pointer fingers. States he had hives and erythema multiforme in 2018 and ever since then joints have bothered him more. Daughter and granddaughter had same hives- told possible hand foot and mouth. Gets occ pain in knees if seated for long periods. Lifestyle  Diet: Avoids salt, avoids fried and fast foods, no sodas or sweet tea, drinks some milk   Exercise: Does a lot of walking, weightligting  ETOH: 2-3 beers/day  Nicotine: Denies    Denies chest pain, chest pressure, or palpitations. Denies SOB, orthopnea, or PND. Denies lower extremity swelling. Denies HA, dizziness, or blurred vision. Has occ near and far sighted vision changes. Plans to go to [facility].  Denies N/V/D or abd pain. Denies BRBPR, melena, or blood in urine. 3 most recent PHQ Screens 11/9/2020   Little interest or pleasure in doing things Not at all   Feeling down, depressed, irritable, or hopeless Not at all   Total Score PHQ 2 0     Needs refill on xanax. Takes very rarely for anxiety.              Patient Active Problem List Diagnosis Code    Hypercholesterolemia without hypertriglyceridemia E78.00    Hypertension, essential, benign I10    History of colonoscopy with polypectomy Z98.890, Z86.010    Corrected transposition of great vessels Q20.5    H/O complete atrioventricular block Z86.79    Sinoatrial node dysfunction (HCC) I49.5    Paroxsymal atrial fibrillation I48.91    Paroxysmal VT (HCC) I47.2    ICD (implantable cardioverter-defibrillator) in place Z95.810    Moderate persistent asthma without complication Y92.91     Past Medical History:   Diagnosis Date    Anxiety     anxiety vs panic disorder    Arrhythmia     Atiral fibrillation, paroxysmal V tach    Asthma     Atrioventricular block, complete ([facility] [address]     Congenital heart problem     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  Pacemaker      Past Surgical History:   Procedure Laterality Date    CARDIAC CATHETERIZATION  9/13/2011    Hemodynamics - RA 5, RV 43/12 LVEDP12, RV sat 81% FA 98%, systemic ventricular function diminished, normal coronaries    COLONOSCOPY N/A 4/21/2017    COL[facility] Pipe [facility]  4/21/2017         HX APPENDECTOMY      HX HEART CATHETERIZATION      HX PACEMAKER  6/2008     AICD [place] # B690. Social History     Socioeconomic History    Marital status:      Spouse name: Not on file    Number of children: Not on file    Years of education: Not on file    Highest education level: Not on file   Tobacco Use    Smoking status: Never Smoker    Smokeless tobacco: Former User   Substance and Sexual Activity    Alcohol use:  Yes     Alcohol/week: 30.0 standard drinks     Types: 12 Cans of beer, 18 Standard drinks or equivalent per week     Comment: 1-12 beers per week    Drug use: No    Sexual activity: Yes     Partners: Female     Family History   Problem Relation Age of Onset    High Cholesterol Mother     COPD Mother     Diabetes Father     COPD Father     No Known Problems Sister     Heart Disease Other      Allergies   Allergen Reactions    Cephalexin Hives       MEDICATIONS  Current Outpatient Medications   Medication Sig    clindamycin (CLEOCIN) 150 mg capsule TAKE 1 CAPSULE BY MOUTH THREE TIMES DAILY    ALPRAZolam (XANAX) 0.5 mg tablet Take 1 Tab by mouth two (2) times daily as needed for Anxiety. Max Daily Amount: 1 mg.  enalapril (VASOTEC) 5 mg tablet Take 1 tablet by mouth twice daily    metoprolol succinate (TOPROL-XL) 50 mg XL tablet Take 1 tablet by mouth once daily    flecainide (TAMBOCOR) 100 mg tablet Take 1 tablet by mouth twice daily    DULERA 200-5 mcg/actuation HFA inhaler INHALE 2 PUFFS BY MOUTH TWICE DAILY    atorvastatin (LIPITOR) 40 mg tablet TAKE 1 TABLET BY MOUTH ONCE DAILY    Cetirizine (ZYRTEC) 10 mg cap Take  by mouth.  aspirin (ASPIRIN) 325 mg tablet Take 1 Tab by mouth daily.  albuterol (VENTOLIN HFA) 90 mcg/actuation inhaler INHALE TWO PUFFS BY MOUTH EVERY 4 HOURS AS NEEDED FOR  WHEEZING     No current facility-administered medications for this visit. REVIEW OF SYSTEMS  Per HPI        Visit Vitals  /73 (BP 1 Location: Left arm, BP Patient Position: Sitting)   Pulse 66   Temp 98.4 °F (36.9 °C) (Oral)   Resp 12   Ht 5' 11\" (1.803 m)   Wt 224 lb 8 oz (101.8 kg)   SpO2 98%   BMI 31.31 kg/m²         General: Well-developed, well-nourished. In no distress. A&O x 3. Head: Normocephalic, atraumatic. Eyes: Conjunctiva clear. Pupils equal, round, reactive to light. Extraocular movements intact. Ears/Nose: TM's and ear canals normal bilaterally. Nares normal. Septum midline. Normal nasal mucosa. No drainage or sinus tenderness. Mouth/Throat: Lips, mucosa, and tongue normal. Oropharynx benign. Neck: Supple, symmetrical, trachea midline, no lymphadenopathy, no carotid bruits, no JVD, thyroid: not enlarged, symmetric, no tenderness/mass/nodules. Lungs: Clear to auscultation bilaterally. No crackles or wheezes. No use of accessory muscles. Speaks in full sentences without SOB. Chest Wall: No tenderness or deformity. Heart: RRR, normal S1 and S2, no murmur, click, rub, or gallop. Back: Symmetric. ROM intact. No CVA tenderness. Abdomen: Soft, non-distended, bowel sounds normal. No tenderness. No masses. No hepatosplenomegaly. .   Skin: No rashes or lesions. Neurological: CN II-XII intact. Normal strength, sensation, and reflexes throughout. Neurovasc: No edema appreciated. Dorsalis pedis pulses are 2+ on the right side, and 2+ on the left side. Posterior tibial pulses are 2+ on the right side, and 2+ on the left side. Musculoskeletal: Gait normal. ROM normal at both knees and shoulders. Psychiatric: Normal mood and affect. Behavior is normal.       No results found for any visits on 11/09/20. ASSESSMENT and PLAN  Diagnoses and all orders for this visit:    1. Hypercholesterolemia without hypertriglyceridemia  -     LIPID PANEL; Future    2. Hypertension, essential, benign  -     METABOLIC PANEL, COMPREHENSIVE; Future  Well controlled, continue current management. 3. Paroxysmal atrial fibrillation (HCC)  Follows with Cards    4. Hyperglycemia  -     HEMOGLOBIN A1C WITH EAG; Future    5. Anxiety  -     ALPRAZolam (XANAX) 0.5 mg tablet; Take 1 Tab by mouth two (2) times daily as needed for Anxiety. Max Daily Amount: 1 mg. Very rare use    6. Bilateral hand pain  -     RHEUMASSURE; Future  -     SED RATE (ESR); Future  -     C REACTIVE PROTEIN, QT; Future    7. Stiffness of joints of both hands  -     RHEUMASSURE; Future  -     SED RATE (ESR); Future  -     C REACTIVE PROTEIN, QT; Future    8.  Screening for prostate cancer  -     PSA SCREENING (SCREENING); Future            Patient Instructions          Hand Pain: Care Instructions  Your Care Instructions     Common causes of hand pain are overuse and injuries, such as might happen during sports or home repair projects. Everyday wear and tear, especially as you get older, also can cause hand pain. Most minor hand injuries will heal on their own, and home treatment is usually all you need to do. If you have sudden and severe pain, you may need tests and treatment. Follow-up care is a key part of your treatment and safety. Be sure to make and go to all appointments, and call your doctor if you are having problems. It's also a good idea to know your test results and keep a list of the medicines you take. How can you care for yourself at home? · Take pain medicines exactly as directed. ? If the doctor gave you a prescription medicine for pain, take it as prescribed. ? If you are not taking a prescription pain medicine, ask your doctor if you can take an over-the-counter medicine. · Rest and protect your hand. Take a break from any activity that may cause pain. · Put ice or a cold pack on your hand for 10 to 20 minutes at a time. Put a thin cloth between the ice and your skin. · Prop up the sore hand on a pillow when you ice it or anytime you sit or lie down during the next 3 days. Try to keep it above the level of your heart. This will help reduce swelling. · If your doctor recommends a sling, splint, or elastic bandage to support your hand, wear it as directed. When should you call for help? Call 911 anytime you think you may need emergency care. For example, call if:    · Your hand turns cool or pale or changes color.    Call your doctor now or seek immediate medical care if:    · You cannot move your hand.     · Your hand pops, moves out of its normal position, and then returns to its normal position.     · You have signs of infection, such as:  ? Increased pain, swelling, warmth, or redness. ? Red streaks leading from the sore area. ? Pus draining from a place on your hand. ? A fever.     · Your hand feels numb or tingly. Watch closely for changes in your health, and be sure to contact your doctor if:    · Your hand feels unstable when you try to use it.     · You do not get better as expected.     · You have any new symptoms, such as swelling.     · Bruises from an injury to your hand last longer than 2 weeks. Where can you learn more? Go to http://genesis-vicky.info/  Enter R273 in the search box to learn more about \"Hand Pain: Care Instructions. \"  Current as of: June 26, 2019               Content Version: 12.6  © 9085-6650 [employer]. Care instructions adapted under license by [email or web address] (which disclaims liability or warranty for this information). If you have questions about a medical condition or this instruction, always ask your healthcare professional. [place] [address] any warranty or liability for your use of this information. Please keep your follow-up appointment with Julian Jordan MD.         Health Maintenance Due   Topic Date Due    Shingrix Vaccine Age 49> (1 of 2) 06/23/2015    Lipid Screen  08/19/2020       I have discussed the diagnosis with the patient and the intended plan as seen in the above orders. Patient is in agreement. The patient has received an after-visit summary and questions were answered concerning future plans. I have discussed medication side effects and warnings with the patient as well. Warning signs for the above conditions were discussed including when to call our office or go to the emergency room. The nurse provided the patient and/or family with advanced directive information if needed and encouraged the patient to provide a copy to the office when available.

## 2020-11-09 NOTE — PROGRESS NOTES
Raisa Duran  Identified pt with two pt identifiers(name and ). Chief Complaint   Patient presents with    Hypertension    Cholesterol Problem       Reviewed record In preparation for visit and have obtained necessary documentation. Has info on advanced directive but has not filled them out. 1. Have you been to the ER, urgent care clinic or hospitalized since your last visit? No     2. Have you seen or consulted any other health care providers outside of the [address] [place] [address] since your last visit? Include any pap smears or colon screening. No    Vitals reviewed with provider. Health Maintenance reviewed:     Health Maintenance Due   Topic    Shingrix Vaccine Age 49> (1 of 2)    Lipid Screen           Wt Readings from Last 3 Encounters:   20 224 lb 8 oz (101.8 kg)   19 224 lb (101.6 kg)   19 225 lb (102.1 kg)        Temp Readings from Last 3 Encounters:   20 98.4 °F (36.9 °C) (Oral)   19 98 °F (36.7 °C) (Oral)   18 97.9 °F (36.6 °C) (Oral)        BP Readings from Last 3 Encounters:   20 120/73   19 118/65   19 100/70        Pulse Readings from Last 3 Encounters:   20 66   19 66   19 68        Vitals:    20 1539   BP: 120/73   Pulse: 66   Resp: 12   Temp: 98.4 °F (36.9 °C)   TempSrc: Oral   SpO2: 98%   Weight: 224 lb 8 oz (101.8 kg)   Height: 5' 11\" (1.803 m)   PainSc:   0 - No pain          Learning Assessment:   :       Learning Assessment 2014   PRIMARY LEARNER Patient   PRIMARY LANGUAGE ENGLISH   LEARNER PREFERENCE PRIMARY READING     DEMONSTRATION     OTHER (COMMENT)   ANSWERED BY patient   RELATIONSHIP SELF        Depression Screening:   :       3 most recent PHQ Screens 2020   Little interest or pleasure in doing things Not at all   Feeling down, depressed, irritable, or hopeless Not at all   Total Score PHQ 2 0        Fall Risk Assessment:   :     No flowsheet data found.      Abuse Screening: :     No flowsheet data found.      ADL Screening:   :       ADL Assessment 2/18/2015   Feeding yourself No Help Needed   Getting from bed to chair No Help Needed   Getting dressed No Help Needed   Bathing or showering No Help Needed   Walk across the room (includes cane/walker) No Help Needed   Using the telphone No Help Needed   Taking your medications No Help Needed   Preparing meals No Help Needed   Managing money (expenses/bills) No Help Needed   Moderately strenuous housework (laundry) No Help Needed   Shopping for personal items (toiletries/medicines) No Help Needed   Shopping for groceries No Help Needed   Driving No Help Needed   Climbing a flight of stairs No Help Needed   Getting to places beyond walking distances No Help Needed

## 2020-11-09 NOTE — PATIENT INSTRUCTIONS

## 2020-11-10 ENCOUNTER — TELEPHONE (OUTPATIENT)
Dept: CARDIOLOGY CLINIC | Age: 55
End: 2020-11-10

## 2020-11-10 NOTE — TELEPHONE ENCOUNTER
Patient is scheduled to have a tooth extracted on Monday 11/16. Patient would like to know if he can have an antibiotic called into his local pharmacy for him to take prior to his appointment. Patient is requesting the same one that was called in for him the last time(about a month ago). Thanks.

## 2020-11-10 NOTE — TELEPHONE ENCOUNTER
Will ask MD/NP    Pt is s/p surgery for transposition of great vessels. Allergy to cephalosporins.  Last ordered clindamycin 600 mg x 1 prior to dental work

## 2020-11-11 RX ORDER — CLINDAMYCIN HYDROCHLORIDE 300 MG/1
600 CAPSULE ORAL ONCE
Qty: 2 CAP | Refills: 0 | Status: SHIPPED | OUTPATIENT
Start: 2020-11-11 | End: 2020-11-11

## 2020-11-11 NOTE — TELEPHONE ENCOUNTER
Verified patient with two types of identifiers. Notified patient okay per MD to order. Verified pharmacy. Patient verbalized understanding and will call with any other questions.       Future Appointments   Date Time Provider Karan Georges   11/19/2020 11:00 AM RUBY3, JJ HO   11/19/2020 11:20 AM MD ROSE MARY Fontanez AMB

## 2020-11-19 ENCOUNTER — OFFICE VISIT (OUTPATIENT)
Dept: CARDIOLOGY CLINIC | Age: 55
End: 2020-11-19

## 2020-11-19 ENCOUNTER — CLINICAL SUPPORT (OUTPATIENT)
Dept: CARDIOLOGY CLINIC | Age: 55
End: 2020-11-19
Payer: COMMERCIAL

## 2020-11-19 VITALS
HEART RATE: 66 BPM | DIASTOLIC BLOOD PRESSURE: 82 MMHG | SYSTOLIC BLOOD PRESSURE: 124 MMHG | WEIGHT: 226 LBS | BODY MASS INDEX: 31.64 KG/M2 | HEIGHT: 71 IN

## 2020-11-19 DIAGNOSIS — Q20.5 CORRECTED TRANSPOSITION OF GREAT VESSELS: ICD-10-CM

## 2020-11-19 DIAGNOSIS — I47.29 PAROXYSMAL VT: ICD-10-CM

## 2020-11-19 DIAGNOSIS — I49.5 SINOATRIAL NODE DYSFUNCTION (HCC): ICD-10-CM

## 2020-11-19 DIAGNOSIS — I48.0 PAROXYSMAL ATRIAL FIBRILLATION (HCC): ICD-10-CM

## 2020-11-19 DIAGNOSIS — Z95.0 CARDIAC PACEMAKER IN SITU: Primary | ICD-10-CM

## 2020-11-19 DIAGNOSIS — Z95.810 AUTOMATIC IMPLANTABLE CARDIAC DEFIBRILLATOR IN SITU: Primary | ICD-10-CM

## 2020-11-19 DIAGNOSIS — Z86.79 H/O COMPLETE ATRIOVENTRICULAR BLOCK: ICD-10-CM

## 2020-11-19 PROCEDURE — 93280 PM DEVICE PROGR EVAL DUAL: CPT | Performed by: INTERNAL MEDICINE

## 2020-11-19 PROCEDURE — 99214 OFFICE O/P EST MOD 30 MIN: CPT | Performed by: INTERNAL MEDICINE

## 2020-11-19 PROCEDURE — 93000 ELECTROCARDIOGRAM COMPLETE: CPT | Performed by: INTERNAL MEDICINE

## 2020-11-19 RX ORDER — BISMUTH SUBSALICYLATE 262 MG
1 TABLET,CHEWABLE ORAL DAILY
COMMUNITY

## 2020-11-20 NOTE — PROGRESS NOTES
Cardiac Electrophysiology Office Note     Subjective:      Flor Velasquez is a 54 y.o. male patient who presents today for follow up, is s/p [employer] dual chamber ICD (last gen change 10/06/2011, RV lead 10/06/2011, RA lead 05/31/1995). Device check today shows proper lead & generator function. No interim discharges. Ventricular paced     States he has been doing well. He denies chest pain, palpitations, SOB, PND, orthopnea, lightheadedness, syncope, or edema. Previous:  Echo (01/18/2018): LVEF 45%, mild diffuse LV hypokinesis, paradoxical ventricular septal motion. RV functions as systemic ventricle receiving blood from LA through TV, apex thickened with mod hypokinesis, RVEF 45%, mild to mod RV dilation, mild RVH. Mild REED. Marked MAC, mild MR. Mild AR. Milt TR. In 2015, switched from flecainide to amiodarone, but had visual & neuro side effects, switched back to flecainide. He declined Tikosyn. 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 Previously followed by Dr. Lucia Civil Dr. Eunice Moreno, has since transferred back to [facility] office. Abnormal nuclear stress 2011 with cardiac cath 9/2011 that showed normal coronary arteries. Works as a persaud.     Patient Active Problem List    Diagnosis Date Noted    Moderate persistent asthma without complication 90/64/0806    ICD (implantable cardioverter-defibrillator) in place 08/12/2015    Paroxysmal VT ([facility] [address] 09/13/2011    Paroxsymal atrial fibrillation 09/30/2010    H/O complete atrioventricular block     Sinoatrial node dysfunction ([facility] [address]     Hypercholesterolemia without hypertriglyceridemia 08/25/2010    Hypertension, essential, benign 08/25/2010    History of colonoscopy with polypectomy 08/25/2010    Corrected transposition of great vessels 08/25/2010     Current Outpatient Medications   Medication Sig Dispense Refill    multivitamin (ONE A DAY) tablet Take 1 Tab by mouth daily.  ALPRAZolam (XANAX) 0.5 mg tablet Take 1 Tab by mouth two (2) times daily as needed for Anxiety. Max Daily Amount: 1 mg. 20 Tab 0    enalapril (VASOTEC) 5 mg tablet Take 1 tablet by mouth twice daily 180 Tab 0    metoprolol succinate (TOPROL-XL) 50 mg XL tablet Take 1 tablet by mouth once daily 90 Tab 0    flecainide (TAMBOCOR) 100 mg tablet Take 1 tablet by mouth twice daily 180 Tab 0    DULERA 200-5 mcg/actuation HFA inhaler INHALE 2 PUFFS BY MOUTH TWICE DAILY 1 Each 5    atorvastatin (LIPITOR) 40 mg tablet TAKE 1 TABLET BY MOUTH ONCE DAILY 90 Tab 3    Cetirizine (ZYRTEC) 10 mg cap Take  by mouth.  aspirin (ASPIRIN) 325 mg tablet Take 1 Tab by mouth daily. 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     Pacemaker      Past Surgical History:   Procedure Laterality Date    CARDIAC CATHETERIZATION  9/13/2011    Hemodynamics - RA 5, RV 43/12 LVEDP12, RV sat 81% FA 98%, systemic ventricular function diminished, normal coronaries    COLONOSCOPY N/A 4/21/2017    COLONOSCOPY performed by Valeria Foster MD at [address]  4/21/2017         HX APPENDECTOMY      HX HEART CATHETERIZATION      HX PACEMAKER  6/2008     [facility] # [id number]. Family History   Problem Relation Age of Onset    High Cholesterol Mother     COPD Mother     Diabetes Father     COPD Father     No Known Problems Sister     Heart Disease Other      Social History     Tobacco Use    Smoking status: Never Smoker    Smokeless tobacco: Former User   Substance Use Topics    Alcohol use: Yes     Alcohol/week: 30.0 standard drinks     Types: 12 Cans of beer, 18 Standard drinks or equivalent per week     Comment: 1-12 beers per week        Review of Systems:   Constitutional: Negative for fever, chills, weight loss, malaise/fatigue. HEENT: Negative for nosebleeds, vision changes. Respiratory: No cough, hemoptysis, sputum production, and  wheezing. Cardiovascular: Negative for chest pain, palpitations, orthopnea, claudication, leg swelling, syncope, and PND. Gastrointestinal: Negative for nausea, vomiting, diarrhea, constipation, blood in stool and melena. Genitourinary: Negative for dysuria, and hematuria. Musculoskeletal: Negative for myalgias, arthralgia. Skin: Negative for rash. Heme: Does not bleed or bruise easily. Neurological: Negative for speech change and focal weakness     Objective:     Visit Vitals  /82   Pulse 66   Ht 5' 11\" (1.803 m)   Wt 226 lb (102.5 kg)   BMI 31.52 kg/m²      Physical Exam:   Constitutional: well-developed and well-nourished. No distress. Head: Normocephalic and atraumatic. Eyes: Pupils are equal, round  Neck: supple. No JVD present. Cardiovascular: Normal rate, regular rhythm and 2/6 systolic murmur heard. Pulmonary/Chest: Lungs clear to auscultation bilaterally   Abdominal: Soft, no tenderness. Musculoskeletal: no edema. Neurological: alert, oriented. Skin: Skin is warm and dry. ICD incision small keloid scar, unremarkable site, slightly prominent along lateral edge. No swelling. Psychiatric: normal mood and affect. Behavior is normal. Judgment and thought content normal.      ECG: Ventricular pacing with atrial sensing sinus rhythm. QRSd 150 ms, consistent with previous. Assessment/Plan:       ICD-10-CM ICD-9-CM    1. Automatic implantable cardiac defibrillator in situ  Z95.810 V45.02 ECHO ADULT COMPLETE   2. Paroxysmal atrial fibrillation (HCC)  I48.0 427.31 AMB POC EKG ROUTINE W/ 12 LEADS, INTER & REP      ECHO ADULT COMPLETE   3. H/O complete atrioventricular block  Z86.79 V12.59    4. Paroxysmal VT ([address]  I47.2 427.1    5. Sinoatrial node dysfunction (HCC)  I49.5 427.81    6. 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 Mr. Sudheer Null dual chamber ICD check today shows proper lead & generator function    Continue flecainide 100 mg po bid because this is the only medication he has tolerated and wanted to use    Last echo done in 01/2018, showed LVEF 45%, stable. Need a new echo. We talked about LVEF and flecainide before but he has been on for long time and no CHF symptoms but would have to stop again if he has lower LVEF and then upgrade to BIV ICD    Continue Toprol XL & enalapril as previously prescribed. Remote device checks q 3 months    Follow-up and Dispositions    · Return in about 1 year (around 11/19/2021). Future Appointments   Date Time Provider Karan Georges   3/17/2021  2:30 PM Zeeshan BAZZI BS AMB   6/23/2021  1:00 PM REMOTE1, JJ MICHAEL AMB   9/29/2021  1:00 PM REMOTE1, JJ BAZZI BS AMB   12/9/2021  1:00 PM ECHO, JJ MICHAEL AMB   12/9/2021  1:40 PM PACEMAKER3, JJ MICHAEL AMB   12/9/2021  2:00 PM MD ROSE MARY Vieyra BS AMB     Thank you for involving me in this patient's care and please call with further concerns or questions.       Saskia Mello Ramona Box M.D.   Electrophysiology/Cardiology  [facility] [place]  [address] [place] [address]  [phone]

## 2020-11-29 LAB
14.3.3 ETA, RHEUM. ARTHRITIS: <0.2 NG/ML
ALBUMIN SERPL-MCNC: 4.7 G/DL (ref 3.8–4.9)
ALBUMIN/GLOB SERPL: 2.2 {RATIO} (ref 1.2–2.2)
ALP SERPL-CCNC: 65 IU/L (ref 39–117)
ALT SERPL-CCNC: 31 IU/L (ref 0–44)
AST SERPL-CCNC: 25 IU/L (ref 0–40)
BILIRUB SERPL-MCNC: 0.8 MG/DL (ref 0–1.2)
BUN SERPL-MCNC: 13 MG/DL (ref 6–24)
BUN/CREAT SERPL: 10 (ref 9–20)
CALCIUM SERPL-MCNC: 9.3 MG/DL (ref 8.7–10.2)
CCP IGA+IGG SERPL IA-ACNC: <20 UNITS
CHLORIDE SERPL-SCNC: 100 MMOL/L (ref 96–106)
CHOLEST SERPL-MCNC: 178 MG/DL (ref 100–199)
CO2 SERPL-SCNC: 24 MMOL/L (ref 20–29)
CREAT SERPL-MCNC: 1.27 MG/DL (ref 0.76–1.27)
CRP SERPL-MCNC: 1 MG/L (ref 0–10)
ERYTHROCYTE [SEDIMENTATION RATE] IN BLOOD BY WESTERGREN METHOD: 2 MM/HR (ref 0–30)
EST. AVERAGE GLUCOSE BLD GHB EST-MCNC: 105 MG/DL
GLOBULIN SER CALC-MCNC: 2.1 G/DL (ref 1.5–4.5)
GLUCOSE SERPL-MCNC: 99 MG/DL (ref 65–99)
HBA1C MFR BLD: 5.3 % (ref 4.8–5.6)
HDLC SERPL-MCNC: 53 MG/DL
LDLC SERPL CALC-MCNC: 103 MG/DL (ref 0–99)
POTASSIUM SERPL-SCNC: 4.5 MMOL/L (ref 3.5–5.2)
PROT SERPL-MCNC: 6.8 G/DL (ref 6–8.5)
PSA SERPL-MCNC: 1.9 NG/ML (ref 0–4)
RHEUMATOID FACT SERPL-ACNC: <14 UNITS/ML
SODIUM SERPL-SCNC: 137 MMOL/L (ref 134–144)
TRIGL SERPL-MCNC: 126 MG/DL (ref 0–149)
VLDLC SERPL CALC-MCNC: 22 MG/DL (ref 5–40)

## 2020-12-02 RX ORDER — FLECAINIDE ACETATE 100 MG/1
TABLET ORAL
Qty: 180 TAB | Refills: 1 | Status: SHIPPED | OUTPATIENT
Start: 2020-12-02 | End: 2021-06-07

## 2020-12-02 NOTE — TELEPHONE ENCOUNTER
Cardiologist: Dr. Lupe Velasco    Last appt: 6/22/2020  Future Appointments   Date Time Provider Karan Georges   3/17/2021  2:30 PM REMOTE1, JJ MICHAEL AMB   6/23/2021  1:00 PM REMOTE1, JJ MICHAEL AMB   9/29/2021  1:00 PM REMOTE1, JJ MICHAEL AMB   12/9/2021  1:00 PM ECHO, JJ MICHAEL AMB   12/9/2021  1:40 PM PACEMAKER3, JJ MICHAEL AMB   12/9/2021  2:00 PM MD ROSE MARY Deluca BS AMB       Requested Prescriptions     Signed Prescriptions Disp Refills    flecainide (TAMBOCOR) 100 mg tablet 180 Tab 1     Sig: Take 1 tablet by mouth twice daily     Authorizing Provider: KONG CARBAJAL     Ordering User: Patti Higginbotham         Refills VO per Dr. Lupe Velasco.

## 2020-12-13 DIAGNOSIS — I48.0 PAROXYSMAL ATRIAL FIBRILLATION (HCC): ICD-10-CM

## 2020-12-14 RX ORDER — METOPROLOL SUCCINATE 50 MG/1
TABLET, EXTENDED RELEASE ORAL
Qty: 90 TAB | Refills: 0 | Status: SHIPPED | OUTPATIENT
Start: 2020-12-14 | End: 2021-03-08

## 2020-12-14 NOTE — TELEPHONE ENCOUNTER
Received refill request for Toprol XL 50 mg tabs. Refill authorized.     Future Appointments   Date Time Provider Karan Georges   3/17/2021  2:30 PM Dania MICHAEL AMB   6/23/2021  1:00 PM REMOTE1, JJ MICHAEL AMB   9/29/2021  1:00 PM REMOTE1, JJ MICHAEL AMB   12/9/2021  1:00 PM ECHO, JJ MICHAEL AMB   12/9/2021  1:40 PM PACEMAKER3, JJ MICHAEL AMB   12/9/2021  2:00 PM MD ROSE MARY Elliott AMB

## 2021-01-11 RX ORDER — ENALAPRIL MALEATE 5 MG/1
TABLET ORAL
Qty: 180 TAB | Refills: 0 | Status: SHIPPED | OUTPATIENT
Start: 2021-01-11 | End: 2021-04-14

## 2021-01-11 NOTE — TELEPHONE ENCOUNTER
Received refill request for enalapril 5 mg tabs. Last labs in 11/2020 showed Cr borderline high, but near previous. Refill authorized.     Future Appointments   Date Time Provider Karan Georges   3/17/2021  2:30 PM Sherwin MICHAEL AMB   6/23/2021  1:00 PM REMOTE1, JJ MICHAEL AMB   9/29/2021  1:00 PM REMOTE1, JJ MICHAEL AMB   12/9/2021  1:00 PM ECHO, JJ MICHAEL AMB   12/9/2021  1:40 PM PACEMAKER3, JJ MICHAEL AMB   12/9/2021  2:00 PM MD ROSE MARY Doll BS AMB

## 2021-01-18 DIAGNOSIS — E78.00 HYPERCHOLESTEROLEMIA WITHOUT HYPERTRIGLYCERIDEMIA: ICD-10-CM

## 2021-01-18 RX ORDER — ATORVASTATIN CALCIUM 40 MG/1
TABLET, FILM COATED ORAL
Qty: 90 TAB | Refills: 0 | Status: SHIPPED | OUTPATIENT
Start: 2021-01-18 | End: 2021-06-07

## 2021-03-07 DIAGNOSIS — I48.0 PAROXYSMAL ATRIAL FIBRILLATION (HCC): ICD-10-CM

## 2021-03-08 RX ORDER — METOPROLOL SUCCINATE 50 MG/1
TABLET, EXTENDED RELEASE ORAL
Qty: 90 TAB | Refills: 1 | Status: SHIPPED | OUTPATIENT
Start: 2021-03-08 | End: 2021-09-01

## 2021-03-08 NOTE — TELEPHONE ENCOUNTER
Request for Toprol XL 50 mg daily. Last office visit 11/19/20, next office visit 12/9/21. Refills per verbal order from Dr. Rock Esposito.

## 2021-03-17 ENCOUNTER — OFFICE VISIT (OUTPATIENT)
Dept: CARDIOLOGY CLINIC | Age: 56
End: 2021-03-17
Payer: COMMERCIAL

## 2021-03-17 DIAGNOSIS — Z95.810 AUTOMATIC IMPLANTABLE CARDIAC DEFIBRILLATOR IN SITU: Primary | ICD-10-CM

## 2021-03-17 PROCEDURE — 93295 DEV INTERROG REMOTE 1/2/MLT: CPT | Performed by: INTERNAL MEDICINE

## 2021-03-17 PROCEDURE — 93296 REM INTERROG EVL PM/IDS: CPT | Performed by: INTERNAL MEDICINE

## 2021-04-14 RX ORDER — ENALAPRIL MALEATE 5 MG/1
TABLET ORAL
Qty: 180 TAB | Refills: 1 | Status: SHIPPED | OUTPATIENT
Start: 2021-04-14 | End: 2021-10-12

## 2021-04-14 NOTE — TELEPHONE ENCOUNTER
Request for Enalapril 5 mg BID . Last office visit 11/19/20, next office visit 12/9/21.  Refills per verbal order from JACEY Thibodeaux.

## 2021-04-22 ENCOUNTER — TELEPHONE (OUTPATIENT)
Dept: INTERNAL MEDICINE CLINIC | Age: 56
End: 2021-04-22

## 2021-04-22 NOTE — TELEPHONE ENCOUNTER
Pt called and stated that he was exposed to covid last weekend. Pt stated that it was his granddaughter who he atr, drank, and \"gave grandpa loves\" too and would like to know if he should also get tested or what he should do. He did stated that he is fully vaccinated and would like to speak to the nurse about this or she can leave a vm.

## 2021-04-22 NOTE — TELEPHONE ENCOUNTER
I called the patient and verified them by name and date of birth. The patient stated his granddaughter tested positive on Monday and he was around her on Saturday. I informed the patient that we follow the CDC guidelines and if he is not having any symptoms, he is not recommended to get tested. Even though he is fully vaccinated it does not mean that he cannot get COVID. He is aware and is not really worried about it. 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 I informed him that if he can minimize contact with people that is fine. The minimum is 10 days quarantine, but we still say the 14 days to be safe. He stated understanding and had no further questions.

## 2021-06-07 DIAGNOSIS — E78.00 HYPERCHOLESTEROLEMIA WITHOUT HYPERTRIGLYCERIDEMIA: ICD-10-CM

## 2021-06-07 RX ORDER — ATORVASTATIN CALCIUM 40 MG/1
TABLET, FILM COATED ORAL
Qty: 90 TABLET | Refills: 0 | Status: SHIPPED | OUTPATIENT
Start: 2021-06-07 | End: 2021-09-23

## 2021-06-07 RX ORDER — FLECAINIDE ACETATE 100 MG/1
TABLET ORAL
Qty: 180 TABLET | Refills: 0 | Status: SHIPPED | OUTPATIENT
Start: 2021-06-07 | End: 2021-09-01

## 2021-06-07 NOTE — TELEPHONE ENCOUNTER
Received refill request for flecainide 100 mg po tabs. Last ECG in 11/2020 showed QRSd 152 ms, but patient is RV paced, had similar ECG in 2019. At last clinic visit in 11/2020, Dr. Spring Kumar indicated follow up in 1 year. Refill authorized.     Future Appointments   Date Time Provider Karan Destini   6/23/2021  1:00 PM Amelie BAZZI BS AMB   9/29/2021  1:00 PM REMOTE1, JJ MICHAEL AMB   12/9/2021  1:00 PM ECHO, JJ MICHAEL AMB   12/9/2021  1:40 PM PACEMAKER3, JJ MICHAEL AMB   12/9/2021  2:00 PM MD ROSE MARY Hernandez BS AMB

## 2021-06-21 ENCOUNTER — OFFICE VISIT (OUTPATIENT)
Dept: INTERNAL MEDICINE CLINIC | Age: 56
End: 2021-06-21
Payer: COMMERCIAL

## 2021-06-21 VITALS
SYSTOLIC BLOOD PRESSURE: 147 MMHG | WEIGHT: 231 LBS | OXYGEN SATURATION: 96 % | DIASTOLIC BLOOD PRESSURE: 87 MMHG | TEMPERATURE: 97.8 F | BODY MASS INDEX: 32.34 KG/M2 | HEART RATE: 68 BPM | RESPIRATION RATE: 16 BRPM | HEIGHT: 71 IN

## 2021-06-21 DIAGNOSIS — M54.50 ACUTE MIDLINE LOW BACK PAIN WITHOUT SCIATICA: Primary | ICD-10-CM

## 2021-06-21 PROCEDURE — 99213 OFFICE O/P EST LOW 20 MIN: CPT | Performed by: NURSE PRACTITIONER

## 2021-06-21 RX ORDER — CYCLOBENZAPRINE HCL 10 MG
10 TABLET ORAL
Qty: 20 TABLET | Refills: 0 | Status: SHIPPED | OUTPATIENT
Start: 2021-06-21 | End: 2021-12-09

## 2021-06-21 RX ORDER — METHYLPREDNISOLONE 4 MG/1
TABLET ORAL
Qty: 1 DOSE PACK | Refills: 0 | Status: SHIPPED | OUTPATIENT
Start: 2021-06-21 | End: 2021-12-09

## 2021-06-21 NOTE — PROGRESS NOTES
HPI  Margarita العراقي is a 54y.o. year old male patient of Brian Bains NP who presents with c/o   Chief Complaint   Patient presents with    Back Pain     Room 1B // lower // burning while urinating one day last week // x 9 days        Pt has history of has Hypercholesterolemia without hypertriglyceridemia, Hypertension, essential, benign, History of colonoscopy with polypectomy, Corrected transposition of great vessels, H/O complete atrioventricular block, Sinoatrial node dysfunction (HCC), Paroxsymal atrial fibrillation, Paroxysmal VT ([address] ICD (implantable cardioverter-defibrillator) in place, and Moderate persistent asthma without complication on their problem list..  Pt c/o low back pain off an on for 4 years. Flared up recently from sitting wrong, then got better. 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 Pain is intermittent. Pain gets so bad his legs give out. Pain is in middle of lower back, doesn't radiate. Denies numbness or tingling in legs. Worst pain is getting up from sitting or lying down, or moving his legs. Denies injury or fall. Denies of hx of back surgery. Denies pain with urination, blood in urine or urinary frequency. Normal bowel movements  Taking advil back pain which dulls it a little, taking tylenol also. 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       Health Maintenance Overdue  Health Maintenance Due   Topic Date Due    Shingrix Vaccine Age 49> (1 of 2) Never done       Depression Screen  3 most recent PHQ Screens 11/9/2020   Little interest or pleasure in doing things Not at all   Feeling down, depressed, irritable, or hopeless Not at all   Total Score PHQ 2 0           Patient Active Problem List   Diagnosis Code    Hypercholesterolemia without hypertriglyceridemia E78.00    Hypertension, essential, benign I10    History of colonoscopy with polypectomy Z98.890, Z86.010    Corrected transposition of great vessels Q20.5    H/O complete atrioventricular block Z86.79    Sinoatrial node dysfunction (HCC) I49.5    Paroxsymal atrial fibrillation I48.91    Paroxysmal VT (HCC) I47.2    ICD (implantable cardioverter-defibrillator) in place Z95.810    Moderate persistent asthma without complication E25.22     Past Medical History:   Diagnosis Date    Anxiety     anxiety vs panic disorder    Arrhythmia     Atiral fibrillation, paroxysmal V tach    Asthma     Atrioventricular block, complete (HCC)     Congenital heart problem     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  Pacemaker      Past Surgical History:   Procedure Laterality Date    CARDIAC CATHETERIZATION  9/13/2011    Hemodynamics - RA 5, RV 43/12 LVEDP12, RV sat 81% FA 98%, systemic ventricular function diminished, normal coronaries    COLONOSCOPY N/A 4/21/2017    COL[facility]  4/21/2017         HX APPENDECTOMY      HX HEART CATHETERIZATION      HX PACEMAKER  6/2008     [facility] # [id number]. Social History     Socioeconomic History    Marital status:      Spouse name: Not on file    Number of children: Not on file    Years of education: Not on file    Highest education level: Not on file   Tobacco Use    Smoking status: Never Smoker    Smokeless tobacco: Former User   Substance and Sexual Activity    Alcohol use:  Yes     Alcohol/week: 30.0 standard drinks     Types: 12 Cans of beer, 18 Standard drinks or equivalent per week     Comment: 1-12 beers per week    Drug use: No    Sexual activity: Yes     Partners: Female     Social Determinants of Health     Financial Resource Strain:     Difficulty of Paying Living Expenses:    Food Insecurity:     Worried About [address] in the Last Year:     [address] in the Last Year:    Transportation Needs:     Lack of Transportation (Medical):  Lack of Transportation (Non-Medical):    Physical Activity:     Days of Exercise per Week:     Minutes of Exercise per Session:    Stress:     Feeling of Stress :    Social Connections:     Frequency of Communication with Friends and Family:     Frequency of Social Gatherings with Friends and Family:     Attends [employer] Services:     Active Member of Clubs or Organizations:     Attends Club or Organization Meetings:     Marital Status:      Family History   Problem Relation Age of Onset    High Cholesterol Mother     COPD Mother     Diabetes Father     COPD Father     No Known Problems Sister     Heart Disease Other      Allergies   Allergen Reactions    Cephalexin Hives       MEDICATIONS  Current Outpatient Medications   Medication Sig    atorvastatin (LIPITOR) 40 mg tablet Take 1 tablet by mouth once daily    flecainide (TAMBOCOR) 100 mg tablet Take 1 tablet by mouth twice daily    enalapril (VASOTEC) 5 mg tablet Take 1 tablet by mouth twice daily    metoprolol succinate (TOPROL-XL) 50 mg XL tablet Take 1 tablet by mouth once daily    multivitamin (ONE A DAY) tablet Take 1 Tab by mouth daily.  ALPRAZolam (XANAX) 0.5 mg tablet Take 1 Tab by mouth two (2) times daily as needed for Anxiety. Max Daily Amount: 1 mg.  DULERA 200-5 mcg/actuation HFA inhaler INHALE 2 PUFFS BY MOUTH TWICE DAILY    Cetirizine (ZYRTEC) 10 mg cap Take  by mouth.  aspirin (ASPIRIN) 325 mg tablet Take 1 Tab by mouth daily.  albuterol (VENTOLIN HFA) 90 mcg/actuation inhaler INHALE TWO PUFFS BY MOUTH EVERY 4 HOURS AS NEEDED FOR  WHEEZING     No current facility-administered medications for this visit.        REVIEW OF SYSTEMS  Per HPI        Visit Vitals  BP (!) 147/87 (BP 1 Location: Left upper arm, BP Patient Position: Sitting, BP Cuff Size: Adult)   Pulse 68   Temp 97.8 °F (36.6 °C) (Oral)   Resp 16   Ht 5' 11\" (1.803 m)   Wt 231 lb (104.8 kg)   SpO2 96%   BMI 32.22 kg/m²         General: Well-developed, well-nourished. In no distress. A&O x 3. Head: Normocephalic, atraumatic. Eyes: Conjunctiva clear. Back: Symmetric. Limited flexion ROM due to pain, normal extension. No spinal tenderness, no SI jt tenderness, no defect noted, negative SLR bilat. Abdomen: Soft, non-distended  Skin: No rashes or lesions. Neurological: CN II-XII intact. LE strength 5/5 bilat. Neurovasc: No edema appreciated. Musculoskeletal: Gait normal.   Psychiatric: Normal mood and affect. Behavior is normal.       No results found for any visits on 06/21/21. ASSESSMENT and PLAN  Diagnoses and all orders for this visit:    1. Acute midline low back pain without sciatica  -     methylPREDNISolone (MEDROL DOSEPACK) 4 mg tablet; Take as directed  -     cyclobenzaprine (FLEXERIL) 10 mg tablet; Take 1 Tablet by mouth three (3) times daily as needed for Muscle Spasm(s). Suspect lumbar strain, recommend above plus daily at home stretches as below as tolerated  If sx persists recommend formal PT as next step and possibly lumbar imgaging          Patient Instructions     Please contact our office if your symptoms worsen or do not improve. Please call 911 or go directly to the Emergency Department if you develop shortness of breath, chest pain, difficulty breathing or worsening of your symptoms. Low Back Pain: Exercises  Introduction  Here are some examples of exercises for you to try. The exercises may be suggested for a condition or for rehabilitation. Start each exercise slowly. Ease off the exercises if you start to have pain. You will be told when to start these exercises and which ones will work best for you. How to do the exercises  Press-up   1. Lie on your stomach, supporting your body with your forearms.   2. Press your elbows down into the floor to raise your upper back. As you do this, relax your stomach muscles and allow your back to arch without using your back muscles. As your press up, do not let your hips or pelvis come off the floor. 3. Hold for 15 to 30 seconds, then relax. 4. Repeat 2 to 4 times. Alternate arm and leg (bird dog) exercise   Do this exercise slowly. Try to keep your body straight at all times, and do not let one hip drop lower than the other. 1. Start on the floor, on your hands and knees. 2. Tighten your belly muscles. 3. Raise one leg off the floor, and hold it straight out behind you. Be careful not to let your hip drop down, because that will twist your trunk. 4. Hold for about 6 seconds, then lower your leg and switch to the other leg. 5. Repeat 8 to 12 times on each leg. 6. Over time, work up to holding for 10 to 30 seconds each time. 7. If you feel stable and secure with your leg raised, try raising the opposite arm straight out in front of you at the same time. Knee-to-chest exercise   1. Lie on your back with your knees bent and your feet flat on the floor. 2. Bring one knee to your chest, keeping the other foot flat on the floor (or keeping the other leg straight, whichever feels better on your lower back). 3. Keep your lower back pressed to the floor. Hold for at least 15 to 30 seconds. 4. Relax, and lower the knee to the starting position. 5. Repeat with the other leg. Repeat 2 to 4 times with each leg. 6. To get more stretch, put your other leg flat on the floor while pulling your knee to your chest.    Curl-ups   1. Lie on the floor on your back with your knees bent at a 90-degree angle. Your feet should be flat on the floor, about 12 inches from your buttocks. 2. Cross your arms over your chest. If this bothers your neck, try putting your hands behind your neck (not your head), with your elbows spread apart.   3. Slowly tighten your belly muscles and raise your shoulder blades off the floor. 4. Keep your head in line with your body, and do not press your chin to your chest.  5. Hold this position for 1 or 2 seconds, then slowly lower yourself back down to the floor. 6. Repeat 8 to 12 times. Pelvic tilt exercise   1. Lie on your back with your knees bent. 2. \"Brace\" your stomach. This means to tighten your muscles by pulling in and imagining your belly button moving toward your spine. You should feel like your back is pressing to the floor and your hips and pelvis are rocking back. 3. Hold for about 6 seconds while you breathe smoothly. 4. Repeat 8 to 12 times. Heel dig bridging   1. Lie on your back with both knees bent and your ankles bent so that only your heels are digging into the floor. Your knees should be bent about 90 degrees. 2. Then push your heels into the floor, squeeze your buttocks, and lift your hips off the floor until your shoulders, hips, and knees are all in a straight line. 3. Hold for about 6 seconds as you continue to breathe normally, and then slowly lower your hips back down to the floor and rest for up to 10 seconds. 4. Do 8 to 12 repetitions. Hamstring stretch in doorway   1. Lie on your back in a doorway, with one leg through the open door. 2. Slide your leg up the wall to straighten your knee. You should feel a gentle stretch down the back of your leg. 3. Hold the stretch for at least 15 to 30 seconds. Do not arch your back, point your toes, or bend either knee. Keep one heel touching the floor and the other heel touching the wall. 4. Repeat with your other leg. 5. Do 2 to 4 times for each leg. Hip flexor stretch   1. Kneel on the floor with one knee bent and one leg behind you. Place your forward knee over your foot. Keep your other knee touching the floor. 2. Slowly push your hips forward until you feel a stretch in the upper thigh of your rear leg. 3. Hold the stretch for at least 15 to 30 seconds. Repeat with your other leg.   4. Do 2 to 4 times on each side. Wall sit   1. Stand with your back 10 to 12 inches away from a wall. 2. Lean into the wall until your back is flat against it. 3. Slowly slide down until your knees are slightly bent, pressing your lower back into the wall. 4. Hold for about 6 seconds, then slide back up the wall. 5. Repeat 8 to 12 times. Follow-up care is a key part of your treatment and safety. Be sure to make and go to all appointments, and call your doctor if you are having problems. It's also a good idea to know your test results and keep a list of the medicines you take. Where can you learn more? Go to http://www.gray.com/  Enter L860 in the search box to learn more about \"Low Back Pain: Exercises. \"  Current as of: November 16, 2020               Content Version: 12.8  © 1929-0214 [employer]. Care instructions adapted under license by [employer] (which disclaims liability or warranty for this information). If you have questions about a medical condition or this instruction, always ask your healthcare professional. [place] [address] any warranty or liability for your use of this information. Please keep your follow-up appointment with Aden Ni NP. Health Maintenance Due   Topic Date Due    Shingrix Vaccine Age 50> (1 of 2) Never done       I have discussed the diagnosis with the patient and the intended plan as seen in the above orders. Patient is in agreement. The patient has received an after-visit summary and questions were answered concerning future plans. I have discussed medication side effects and warnings with the patient as well. Warning signs for the above conditions were discussed including when to call our office or go to the emergency room.      The nurse provided the patient and/or family with advanced directive information if needed and encouraged the patient to provide a copy to the office when available.

## 2021-06-21 NOTE — PATIENT INSTRUCTIONS
Please contact our office if your symptoms worsen or do not improve. Please call 911 or go directly to the Emergency Department if you develop shortness of breath, chest pain, difficulty breathing or worsening of your symptoms. Low Back Pain: Exercises  Introduction  Here are some examples of exercises for you to try. The exercises may be suggested for a condition or for rehabilitation. Start each exercise slowly. Ease off the exercises if you start to have pain. You will be told when to start these exercises and which ones will work best for you. How to do the exercises  Press-up   1. Lie on your stomach, supporting your body with your forearms. 2. Press your elbows down into the floor to raise your upper back. As you do this, relax your stomach muscles and allow your back to arch without using your back muscles. As your press up, do not let your hips or pelvis come off the floor. 3. Hold for 15 to 30 seconds, then relax. 4. Repeat 2 to 4 times. Alternate arm and leg (bird dog) exercise   Do this exercise slowly. Try to keep your body straight at all times, and do not let one hip drop lower than the other. 1. Start on the floor, on your hands and knees. 2. Tighten your belly muscles. 3. Raise one leg off the floor, and hold it straight out behind you. Be careful not to let your hip drop down, because that will twist your trunk. 4. Hold for about 6 seconds, then lower your leg and switch to the other leg. 5. Repeat 8 to 12 times on each leg. 6. Over time, work up to holding for 10 to 30 seconds each time. 7. If you feel stable and secure with your leg raised, try raising the opposite arm straight out in front of you at the same time. Knee-to-chest exercise   1. Lie on your back with your knees bent and your feet flat on the floor.   2. Bring one knee to your chest, keeping the other foot flat on the floor (or keeping the other leg straight, whichever feels better on your lower back).  3. Keep your lower back pressed to the floor. Hold for at least 15 to 30 seconds. 4. Relax, and lower the knee to the starting position. 5. Repeat with the other leg. Repeat 2 to 4 times with each leg. 6. To get more stretch, put your other leg flat on the floor while pulling your knee to your chest.    Curl-ups   1. Lie on the floor on your back with your knees bent at a 90-degree angle. Your feet should be flat on the floor, about 12 inches from your buttocks. 2. Cross your arms over your chest. If this bothers your neck, try putting your hands behind your neck (not your head), with your elbows spread apart. 3. Slowly tighten your belly muscles and raise your shoulder blades off the floor. 4. Keep your head in line with your body, and do not press your chin to your chest.  5. Hold this position for 1 or 2 seconds, then slowly lower yourself back down to the floor. 6. Repeat 8 to 12 times. Pelvic tilt exercise   1. Lie on your back with your knees bent. 2. \"Brace\" your stomach. This means to tighten your muscles by pulling in and imagining your belly button moving toward your spine. You should feel like your back is pressing to the floor and your hips and pelvis are rocking back. 3. Hold for about 6 seconds while you breathe smoothly. 4. Repeat 8 to 12 times. Heel dig bridging   1. Lie on your back with both knees bent and your ankles bent so that only your heels are digging into the floor. Your knees should be bent about 90 degrees. 2. Then push your heels into the floor, squeeze your buttocks, and lift your hips off the floor until your shoulders, hips, and knees are all in a straight line. 3. Hold for about 6 seconds as you continue to breathe normally, and then slowly lower your hips back down to the floor and rest for up to 10 seconds. 4. Do 8 to 12 repetitions. Hamstring stretch in doorway   1. Lie on your back in a doorway, with one leg through the open door.   2. Slide your leg up the wall to straighten your knee. You should feel a gentle stretch down the back of your leg. 3. Hold the stretch for at least 15 to 30 seconds. Do not arch your back, point your toes, or bend either knee. Keep one heel touching the floor and the other heel touching the wall. 4. Repeat with your other leg. 5. Do 2 to 4 times for each leg. Hip flexor stretch   1. Kneel on the floor with one knee bent and one leg behind you. Place your forward knee over your foot. Keep your other knee touching the floor. 2. Slowly push your hips forward until you feel a stretch in the upper thigh of your rear leg. 3. Hold the stretch for at least 15 to 30 seconds. Repeat with your other leg. 4. Do 2 to 4 times on each side. Wall sit   1. Stand with your back 10 to 12 inches away from a wall. 2. Lean into the wall until your back is flat against it. 3. Slowly slide down until your knees are slightly bent, pressing your lower back into the wall. 4. Hold for about 6 seconds, then slide back up the wall. 5. Repeat 8 to 12 times. Follow-up care is a key part of your treatment and safety. Be sure to make and go to all appointments, and call your doctor if you are having problems. It's also a good idea to know your test results and keep a list of the medicines you take. Where can you learn more? Go to http://www.gray.com/  Enter G148 in the search box to learn more about \"Low Back Pain: Exercises. \"  Current as of: November 16, 2020               Content Version: 12.8  © 2006-2021 [employer]. Care instructions adapted under license by [employer]* (which disclaims liability or warranty for this information). If you have questions about a medical condition or this instruction, always ask your healthcare professional. [address] any warranty or liability for your use of this information.

## 2021-06-21 NOTE — PROGRESS NOTES
Bhavna Rivera  Identified pt with two pt identifiers(name and ). Chief Complaint   Patient presents with    Back Pain     Room 1B // lower // burning while urinating one day last week // x 9 days        Reviewed record In preparation for visit and have obtained necessary documentation. 1. Have you been to the ER, urgent care clinic or hospitalized since your last visit? No     2. Have you seen or consulted any other health care providers outside of the [address] [place] [address] since your last visit? Include any pap smears or colon screening. No    Patient does not have an advance directive. Vitals reviewed with provider.     Health Maintenance reviewed:     Health Maintenance Due   Topic    Shingrix Vaccine Age 50> (1 of 2)          Wt Readings from Last 3 Encounters:   21 231 lb (104.8 kg)   20 226 lb (102.5 kg)   20 224 lb 8 oz (101.8 kg)        Temp Readings from Last 3 Encounters:   21 97.8 °F (36.6 °C) (Oral)   20 98.4 °F (36.9 °C) (Oral)   19 98 °F (36.7 °C) (Oral)        BP Readings from Last 3 Encounters:   21 (!) 147/87   20 124/82   20 120/73        Pulse Readings from Last 3 Encounters:   21 68   20 66   20 66        Vitals:    21 1309   BP: (!) 147/87   Pulse: 68   Resp: 16   Temp: 97.8 °F (36.6 °C)   TempSrc: Oral   SpO2: 96%   Weight: 231 lb (104.8 kg)   Height: 5' 11\" (1.803 m)   PainSc:   8   PainLoc: Back          Learning Assessment:   :       Learning Assessment 2014   PRIMARY LEARNER Patient   PRIMARY LANGUAGE ENGLISH   LEARNER PREFERENCE PRIMARY READING     DEMONSTRATION     OTHER (COMMENT)   ANSWERED BY patient   RELATIONSHIP SELF        Depression Screening:   :       3 most recent PHQ Screens 2021   Little interest or pleasure in doing things Not at all   Feeling down, depressed, irritable, or hopeless Not at all   Total Score PHQ 2 0        Fall Risk Assessment:   :     No flowsheet data found. Abuse Screening:   :     No flowsheet data found.      ADL Screening:   :       ADL Assessment 2/18/2015   Feeding yourself No Help Needed   Getting from bed to chair No Help Needed   Getting dressed No Help Needed   Bathing or showering No Help Needed   Walk across the room (includes cane/walker) No Help Needed   Using the telphone No Help Needed   Taking your medications No Help Needed   Preparing meals No Help Needed   Managing money (expenses/bills) No Help Needed   Moderately strenuous housework (laundry) No Help Needed   Shopping for personal items (toiletries/medicines) No Help Needed   Shopping for groceries No Help Needed   Driving No Help Needed   Climbing a flight of stairs No Help Needed   Getting to places beyond walking distances No Help Needed

## 2021-06-21 NOTE — LETTER
NOTIFICATION RETURN TO WORK / SCHOOL 
 
6/21/2021 1:38 PM 
 
Mr. Margarita العراقي [address] [facility] [id number] To Whom It May Concern: 
 
Margarita العراقي is currently under the care of [address] [place] [address]. Please excuse patient from work tomorrow and Wednesday due to back injury. If there are questions or concerns please have the patient contact our office. Sincerely, Stacey Booth, NP

## 2021-06-23 ENCOUNTER — OFFICE VISIT (OUTPATIENT)
Dept: CARDIOLOGY CLINIC | Age: 56
End: 2021-06-23
Payer: COMMERCIAL

## 2021-06-23 DIAGNOSIS — Z95.810 AUTOMATIC IMPLANTABLE CARDIAC DEFIBRILLATOR IN SITU: Primary | ICD-10-CM

## 2021-06-23 PROCEDURE — 93296 REM INTERROG EVL PM/IDS: CPT | Performed by: INTERNAL MEDICINE

## 2021-06-23 PROCEDURE — 93295 DEV INTERROG REMOTE 1/2/MLT: CPT | Performed by: INTERNAL MEDICINE

## 2021-06-23 NOTE — LETTER
2021 11:07 AM    Mr. Selwyn Landin  [id number] [address] [place] [id number]        Dear Patient,    This letter is to inform you that as of  [facility] will no longer be sending out confirmation letters for remote transmissions through the [employer]. All letters in the future will be posted in an electronic medical records format therefore we highly encourage enrollment in [facility] patient's portal. Once enrolled you will have access to any letters we send as well as appointment information that can be found in your medical record. Our EP team would contact you via phone if there are significant abnormal findings so you can discuss with Dr. Madelaine Morales further in office. Missed transmission letters will also be digitized in [employer] but we will continue to send those out through the mail as well. How to register for [employer]:    - Visit [employer]  - Click Create an Account  - Provide your name, address, and   - You will then be asked a short series of questions to verify your identity. This helps to ensure that no one else can access your information.   - If you have any further questions, please contact our office at [phone]. If you choose not to enroll in [facility] or do not have internet access, please kindly let device clinic know and we will accommodate your preference. We are grateful for your understanding and looking forward to this new, improved and more efficient way of communication.            Warm Regards,  [facility] Staff

## 2021-06-23 NOTE — LETTER
6/23/2021 11:07 AM    Mr. Salvador Loya  [id number] [facility] [address]              After careful review of your remote check of your implated device on 9-27-[birth year]. I have concluded that your device is working properly. Your next:                Automatic remote check ( from home) is scheduled for  9-29-21                             If you have any questions, please call [address] at [phone].      Additional Comments: ________________________________________________    __________________________________________________________________    __________________________________________________________________              Sincerely,          Jodi Stein MD [facility]

## 2021-09-23 DIAGNOSIS — E78.00 HYPERCHOLESTEROLEMIA WITHOUT HYPERTRIGLYCERIDEMIA: ICD-10-CM

## 2021-09-23 RX ORDER — ATORVASTATIN CALCIUM 40 MG/1
TABLET, FILM COATED ORAL
Qty: 90 TABLET | Refills: 0 | Status: SHIPPED | OUTPATIENT
Start: 2021-09-23 | End: 2021-12-03

## 2021-09-29 ENCOUNTER — OFFICE VISIT (OUTPATIENT)
Dept: CARDIOLOGY CLINIC | Age: 56
End: 2021-09-29
Payer: COMMERCIAL

## 2021-09-29 DIAGNOSIS — Z95.810 AUTOMATIC IMPLANTABLE CARDIAC DEFIBRILLATOR IN SITU: Primary | ICD-10-CM

## 2021-09-29 NOTE — LETTER
9/29/2021 5:00 PM    Mr. Dyan Burns  [id number] [facility] [id number]                  This letter confirms that we have received your scheduled remote check of your implanted     device on 9-29-21  . Our EP team will contact you via phone if there are significant abnormal    findings. Your next in-clinic device check is scheduled for 12-9-21 at 1:40 PM  .                   If you have any questions, please call [address] [place] [address] at [phone].                Sincerely,    Sierra Magana MD [facility]

## 2021-10-04 PROCEDURE — 93295 DEV INTERROG REMOTE 1/2/MLT: CPT | Performed by: INTERNAL MEDICINE

## 2021-10-04 PROCEDURE — 93296 REM INTERROG EVL PM/IDS: CPT | Performed by: INTERNAL MEDICINE

## 2021-12-03 DIAGNOSIS — E78.00 HYPERCHOLESTEROLEMIA WITHOUT HYPERTRIGLYCERIDEMIA: ICD-10-CM

## 2021-12-03 RX ORDER — ATORVASTATIN CALCIUM 40 MG/1
TABLET, FILM COATED ORAL
Qty: 90 TABLET | Refills: 0 | Status: SHIPPED | OUTPATIENT
Start: 2021-12-03 | End: 2022-03-21

## 2021-12-09 ENCOUNTER — CLINICAL SUPPORT (OUTPATIENT)
Dept: CARDIOLOGY CLINIC | Age: 56
End: 2021-12-09

## 2021-12-09 ENCOUNTER — OFFICE VISIT (OUTPATIENT)
Dept: CARDIOLOGY CLINIC | Age: 56
End: 2021-12-09

## 2021-12-09 ENCOUNTER — ANCILLARY PROCEDURE (OUTPATIENT)
Dept: CARDIOLOGY CLINIC | Age: 56
End: 2021-12-09
Payer: COMMERCIAL

## 2021-12-09 VITALS
HEART RATE: 80 BPM | OXYGEN SATURATION: 97 % | SYSTOLIC BLOOD PRESSURE: 120 MMHG | HEIGHT: 71 IN | WEIGHT: 231 LBS | BODY MASS INDEX: 32.34 KG/M2 | RESPIRATION RATE: 16 BRPM | DIASTOLIC BLOOD PRESSURE: 60 MMHG

## 2021-12-09 VITALS
DIASTOLIC BLOOD PRESSURE: 88 MMHG | HEIGHT: 71 IN | SYSTOLIC BLOOD PRESSURE: 138 MMHG | WEIGHT: 231 LBS | BODY MASS INDEX: 32.34 KG/M2

## 2021-12-09 DIAGNOSIS — I48.0 PAROXYSMAL ATRIAL FIBRILLATION (HCC): ICD-10-CM

## 2021-12-09 DIAGNOSIS — Z95.810 AUTOMATIC IMPLANTABLE CARDIAC DEFIBRILLATOR IN SITU: ICD-10-CM

## 2021-12-09 DIAGNOSIS — Z95.810 AUTOMATIC IMPLANTABLE CARDIAC DEFIBRILLATOR IN SITU: Primary | ICD-10-CM

## 2021-12-09 DIAGNOSIS — I47.29 PAROXYSMAL VT: ICD-10-CM

## 2021-12-09 DIAGNOSIS — Z95.810 ICD (IMPLANTABLE CARDIOVERTER-DEFIBRILLATOR) IN PLACE: Primary | ICD-10-CM

## 2021-12-09 DIAGNOSIS — I50.1 HEART FAILURE, LEFT, WITH LVEF 41-49% (HCC): ICD-10-CM

## 2021-12-09 DIAGNOSIS — Z86.79 H/O COMPLETE ATRIOVENTRICULAR BLOCK: ICD-10-CM

## 2021-12-09 DIAGNOSIS — Q20.5 CORRECTED TRANSPOSITION OF GREAT VESSELS: ICD-10-CM

## 2021-12-09 PROCEDURE — 93283 PRGRMG EVAL IMPLANTABLE DFB: CPT | Performed by: INTERNAL MEDICINE

## 2021-12-09 PROCEDURE — 93306 TTE W/DOPPLER COMPLETE: CPT | Performed by: INTERNAL MEDICINE

## 2021-12-09 PROCEDURE — 99214 OFFICE O/P EST MOD 30 MIN: CPT | Performed by: INTERNAL MEDICINE

## 2021-12-09 PROCEDURE — 93000 ELECTROCARDIOGRAM COMPLETE: CPT | Performed by: INTERNAL MEDICINE

## 2021-12-09 NOTE — PATIENT INSTRUCTIONS
Your Biventricular ICD Upgrade procedure has been scheduled for 4/15/22 at 8:00 am, at [facility].    Please report to Admitting Department by 6:15 am, or 2 hours prior to your scheduled procedure. Please bring a list of your current medications and medication bottles, if able, to the hospital on this day. You will be unable to drive after your procedure so please make sure to bring someone with you to your procedure. You will also need to see Dr. Atif Gutierrez Nurse Practitioner, Barnetta Dancer, in office prior to your procedure.  An appointment has been scheduled for 3/23/22 at 10:00 am.

## 2021-12-09 NOTE — PROGRESS NOTES
Cardiac Electrophysiology Office Note     Subjective:      Fito Shah is a 64 y.o. male patient who presents today for follow up, is s/p [employer] dual chamber ICD (last gen change 10/06/2011, RV lead 10/06/2011, RA lead 05/31/1995). Device check today shows proper lead & generator function. No interim discharges. Ventricular paced 100% due to complete av block  He has LVEF 40-45% on previous echo and today was difficult to see but LVEF estimated 35-40%  He denies chest pain, palpitations, syncope, or edema. Previous:  Echo (01/18/2018): LVEF 45%, mild diffuse LV hypokinesis, paradoxical ventricular septal motion. RV functions as systemic ventricle receiving blood from LA through TV, apex thickened with mod hypokinesis, RVEF 45%, mild to mod RV dilation, mild RVH. Mild REED. Marked MAC, mild MR. Mild AR. Milt TR. In 2015, switched from flecainide to amiodarone, but had visual & neuro side effects, switched back to flecainide. He declined Tikosyn. 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 Previously followed by Dr. Maira Reid, has since transferred back to [facility] office. Abnormal nuclear stress 2011 with cardiac cath 9/2011 that showed normal coronary arteries. Works as a persaud.     Patient Active Problem List    Diagnosis Date Noted    Moderate persistent asthma without complication 19/44/5302    ICD (implantable cardioverter-defibrillator) in place 08/12/2015    Paroxysmal VT ([facility] [address] 09/13/2011    Paroxsymal atrial fibrillation 09/30/2010    H/O complete atrioventricular block     Sinoatrial node dysfunction ([address]     Hypercholesterolemia without hypertriglyceridemia 08/25/2010    Hypertension, essential, benign 08/25/2010    History of colonoscopy with polypectomy 08/25/2010    Corrected transposition of great vessels 08/25/2010     Current Outpatient Medications   Medication Sig Dispense Refill    atorvastatin (LIPITOR) 40 mg tablet Take 1 tablet by mouth once daily 90 Tablet 0    enalapril (VASOTEC) 5 mg tablet Take 1 tablet by mouth twice daily 180 Tablet 1    flecainide (TAMBOCOR) 100 mg tablet Take 1 tablet by mouth twice daily 180 Tablet 1    metoprolol succinate (TOPROL-XL) 50 mg XL tablet Take 1 tablet by mouth once daily 90 Tablet 1    multivitamin (ONE A DAY) tablet Take 1 Tab by mouth daily.  DULERA 200-5 mcg/actuation HFA inhaler INHALE 2 PUFFS BY MOUTH TWICE DAILY 1 Each 5    Cetirizine (ZYRTEC) 10 mg cap Take  by mouth.  aspirin (ASPIRIN) 325 mg tablet Take 1 Tab by mouth daily. 90 Tab 3    albuterol (VENTOLIN HFA) 90 mcg/actuation inhaler INHALE TWO PUFFS BY MOUTH EVERY 4 HOURS AS NEEDED FOR  WHEEZING 1 Inhaler 2    methylPREDNISolone (MEDROL DOSEPACK) 4 mg tablet Take as directed (Patient not taking: Reported on 12/9/2021) 1 Dose Pack 0    cyclobenzaprine (FLEXERIL) 10 mg tablet Take 1 Tablet by mouth three (3) times daily as needed for Muscle Spasm(s). 20 Tablet 0    ALPRAZolam (XANAX) 0.5 mg tablet Take 1 Tab by mouth two (2) times daily as needed for Anxiety. Max Daily Amount: 1 mg. 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     Pacemaker      Past Surgical History:   Procedure Laterality Date    CARDIAC CATHETERIZATION  9/13/2011    Hemodynamics - RA 5, RV 43/12 LVEDP12, RV sat 81% FA 98%, systemic ventricular function diminished, normal coronaries    COLONOSCOPY N/A 4/21/2017    COLONOSCOPY performed by Meghann Hunt MD at [facility]  4/21/2017         HX APPENDECTOMY      HX HEART CATHETERIZATION      HX PACEMAKER  6/2008     [facility] [employer] # [id number]. Family History   Problem Relation Age of Onset    High Cholesterol Mother     COPD Mother     Diabetes Father     COPD Father     No Known Problems Sister     Heart Disease Other      Social History     Tobacco Use    Smoking status: Never Smoker    Smokeless tobacco: Former User   Substance Use Topics    Alcohol use: Yes     Alcohol/week: 30.0 standard drinks     Types: 12 Cans of beer, 18 Standard drinks or equivalent per week     Comment: 1-12 beers per week        Review of Systems:   Constitutional: Negative for fever, chills, weight loss, malaise/fatigue. HEENT: Negative for nosebleeds, vision changes. Respiratory: No cough, hemoptysis, sputum production, and  wheezing. Cardiovascular: Negative for chest pain, palpitations, orthopnea, claudication, leg swelling, syncope, and PND. Gastrointestinal: Negative for nausea, vomiting, diarrhea, constipation, blood in stool and melena. Genitourinary: Negative for dysuria, and hematuria. Musculoskeletal: Negative for myalgias, arthralgia. Skin: Negative for rash. Heme: Does not bleed or bruise easily. Neurological: Negative for speech change and focal weakness     Objective:     Visit Vitals  /60   Pulse 80   Resp 16   Ht 5' 11\" (1.803 m)   Wt 231 lb (104.8 kg)   SpO2 97%   BMI 32.22 kg/m²      Physical Exam:   Constitutional: well-developed and well-nourished. No distress. Head: Normocephalic and atraumatic. Eyes: Pupils are equal, round  Neck: supple. No JVD present.    Cardiovascular: Normal rate, regular rhythm and 2/6 systolic murmur heard. Pulmonary/Chest: Lungs clear to auscultation bilaterally   Abdominal: Soft, no tenderness. Musculoskeletal: no edema. Neurological: alert, oriented. Skin: Skin is warm and dry. ICD incision small keloid scar, unremarkable site, slightly prominent along lateral edge. No swelling. Psychiatric: normal mood and affect. Behavior is normal. Judgment and thought content normal.      ECG: Ventricular pacing with atrial sensing QRSd 160 ms     Assessment/Plan:       ICD-10-CM ICD-9-CM    1. ICD (implantable cardioverter-defibrillator) in place  Z95.810 V45.02 AMB POC EKG ROUTINE W/ 12 LEADS, INTER & REP   2. Paroxysmal atrial fibrillation (HCC)  I48.0 427.31    3. H/O complete atrioventricular block  Z86.79 V12.59    4. Paroxysmal VT ([address]  I47.2 427.1    5. 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 Mr. Robbi Cullen dual chamber ICD check today shows proper lead & generator function  But it will be HANS in 5 months  Continue flecainide 100 mg po bid because this is the only medication he has tolerated and wanted to use despite mild LVEF decline  He did not like amiodarone for PAF  LVEF 35-40%, stable but since he needs generator replacement in 4-5 months I recommended upgrade to BIV ICD to avoid of infection with future upgrade (RV Pacing 100% due to complete av block and  ms)  Continue Toprol XL & enalapril as previously prescribed. Remote device checks q 3 months      Addendum  Device Reached HANS on 2-2-22; BIV ICD scheduled for 4-15-22.         Risks involve device implant include but are not limited to bleeding, infection, valvular damage, heart attack, stroke, lung collapse (pneumothorax or hemothorax), heart collapse (pericardial tamponade), heart perforation, kidney failure, death. Elective or emergency surgery may be required to repair some of these complications. Prolonged hospitalization would be required. General anesthesia may be required for the procedure. Future Appointments   Date Time Provider Karan Georges   3/28/2022  2:15 PM REMOTE1, Mikal MICHAEL AMB   7/6/2022  8:30 AM REMOTE1, JJ MICHAEL AMB   10/10/2022 12:30 PM REMOTE1, JJ MICHAEL AMB   1/5/2023  1:00 PM PACEMAKER3, JJ MICHAEL AMB   1/5/2023  1:20 PM MD ROSE MARY Gutierrez BS AMB     Thank you for involving me in this patient's care and please call with further concerns or questions. Lary Chandler M.D.   Electrophysiology/Cardiology  [facility] [place]  [facility], [address]  [phone]

## 2021-12-10 LAB
ECHO AV MEAN GRADIENT: 6.02 MMHG
ECHO AV PEAK GRADIENT: 10.63 MMHG
ECHO AV PEAK VELOCITY: 163.03 CM/S
ECHO AV VTI: 30.89 CM
ECHO LV E' LATERAL VELOCITY: 6.12 CM/S
ECHO LV E' SEPTAL VELOCITY: 8.78 CM/S
ECHO LVOT PEAK GRADIENT: 3.44 MMHG
ECHO LVOT PEAK VELOCITY: 92.68 CM/S
ECHO LVOT VTI: 15.69 CM
ECHO MV A VELOCITY: 68.37 CM/S
ECHO MV AREA PHT: 4.88 CM2
ECHO MV E DECELERATION TIME (DT): 155.38 MS
ECHO MV E VELOCITY: 75.16 CM/S
ECHO MV E/A RATIO: 1.1
ECHO MV E/E' LATERAL: 12.28
ECHO MV E/E' RATIO (AVERAGED): 10.42
ECHO MV E/E' SEPTAL: 8.56
ECHO MV PRESSURE HALF TIME (PHT): 45.06 MS
LVOT MG: 1.89 MMHG

## 2022-02-03 ENCOUNTER — TELEPHONE (OUTPATIENT)
Dept: CARDIOLOGY CLINIC | Age: 57
End: 2022-02-03

## 2022-02-03 NOTE — TELEPHONE ENCOUNTER
Attempted to reach patient by telephone. A message was left for return call. Also sending via680 message to see if patient is okay to move up generator change to 3/18/22 at 8:00 or 1:00.

## 2022-02-03 NOTE — TELEPHONE ENCOUNTER
Verified patient with two types of identifiers. Rescheduled procedure and appt dates/times reviewed with patient. Updated instructions for rescheduled BiV ICD upgrade sent to patient in [address] Patient verbalized understanding and will call with any other questions.         Future Appointments   Date Time Provider Karan Georges   3/4/2022 11:30 AM JACEY Parks AMB   3/28/2022  2:15 PM REMOTE1, JJ MICHAEL AMB   7/6/2022  8:30 AM REMOTE1, JJ MICHAEL AMB   10/10/2022 12:30 PM REMOTE1, JJ MICHAEL AMB   1/5/2023  1:00 PM PACEMAKER3, JJ MICHAEL AMB   1/5/2023  1:20 PM Marlena Goodpasture, MD CAVREY BS AMB

## 2022-02-03 NOTE — TELEPHONE ENCOUNTER
----- Message from Luciano Ayala sent at 2/3/2022 10:26 AM EST -----  Marco Antonio Cline,  Device Reached HANS on 2-2-22; gen change scheduled for 4-15-22.

## 2022-02-07 ENCOUNTER — TELEPHONE (OUTPATIENT)
Dept: CARDIOLOGY CLINIC | Age: 57
End: 2022-02-07

## 2022-02-07 NOTE — TELEPHONE ENCOUNTER
Patient states his device is going off every twelve hours (09:13am and 09:13). Patient requesting to speak with someone in the device clinic regarding his upcoming procedure.          [address] Lisa Saini

## 2022-02-07 NOTE — TELEPHONE ENCOUNTER
Spoke to patient. He does not want to come by the office to turn the toning OFF at this time . .. but if it gets to annoying; he will swing by. Generator change moved up to 3-18-22.

## 2022-02-28 NOTE — PROGRESS NOTES
Cardiac Electrophysiology Office Note     Subjective:      Ivan Johnson is a 64 y.o. male patient who presents for H&P update prior to upcoming planned upgrade to biventricular ICD (scheduled for 03/18/2022), currently has [address] dual chamber ICD (last gen change 10/06/2011, RV lead 10/06/2011, RA lead 05/31/1995). Current device reached HANS on 02/02/2022. Ventricular paced 100% due to complete AV block. NICM. LVEF 35-40% in 12/2021. NYHA I-II chronic systolic CHF. On appropriate GDMT. Continues flecainide. ECG in 12/2021 showed AS- rhythm; QRSd 162 ms due to ventricular pacing. He denies chest pain, palpitations, syncope, or edema. BP controlled. Previous: In 2015, switched from flecainide to amiodarone, but had visual & neuro side effects, switched back to flecainide. He declined Tikosyn. 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 Previously followed by Dr. Lynette Burgos, has since transferred back to [facility] office. Abnormal nuclear stress 2011 with cardiac cath 9/2011 that showed normal coronary arteries. Works as a persaud.     Patient Active Problem List    Diagnosis Date Noted    Moderate persistent asthma without complication 70/34/0727    ICD (implantable cardioverter-defibrillator) in place 08/12/2015    Paroxysmal VT ([facility] [address] 09/13/2011    Paroxsymal atrial fibrillation 09/30/2010    H/O complete atrioventricular block     Sinoatrial node dysfunction ([facility] [address]     Hypercholesterolemia without hypertriglyceridemia 08/25/2010    Hypertension, essential, benign 08/25/2010    History of colonoscopy with polypectomy 08/25/2010    Corrected transposition of great vessels 08/25/2010     Current Outpatient Medications   Medication Sig Dispense Refill    chlorhexidine (Hibiclens) 4 % liquid Apply to clean, dry skin daily x 5 days prior to ICD upgrade. 1 Each 0    flecainide (TAMBOCOR) 100 mg tablet Take 1 tablet by mouth twice daily 180 Tablet 1    metoprolol succinate (TOPROL-XL) 50 mg XL tablet Take 1 tablet by mouth once daily 90 Tablet 1    atorvastatin (LIPITOR) 40 mg tablet Take 1 tablet by mouth once daily 90 Tablet 0    enalapril (VASOTEC) 5 mg tablet Take 1 tablet by mouth twice daily 180 Tablet 1    multivitamin (ONE A DAY) tablet Take 1 Tab by mouth daily.  DULERA 200-5 mcg/actuation HFA inhaler INHALE 2 PUFFS BY MOUTH TWICE DAILY 1 Each 5    Cetirizine (ZYRTEC) 10 mg cap Take  by mouth daily.  aspirin (ASPIRIN) 325 mg tablet Take 1 Tab by mouth daily. 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  Pacemaker      Past Surgical History:   Procedure Laterality Date    CARDIAC CATHETERIZATION  9/13/2011    Hemodynamics - RA 5, RV 43/12 LVEDP12, RV sat 81% FA 98%, systemic ventricular function diminished, normal coronaries    COLONOSCOPY N/A 4/21/2017    [facility]  4/21/2017         HX APPENDECTOMY      HX HEART CATHETERIZATION      HX PACEMAKER  6/2008     [facility] [employer] # [id number].      Family History   Problem Relation Age of Onset    High Cholesterol Mother     COPD Mother     Diabetes Father     COPD Father     No Known Problems Sister     Heart Disease Other      Social History     Tobacco Use    Smoking status: Never Smoker    Smokeless tobacco: Former User   Substance Use Topics    Alcohol use: Yes     Alcohol/week: 30.0 standard drinks     Types: 12 Cans of beer, 18 Standard drinks or equivalent per week     Comment: 1-12 beers per week        Review of Systems:   Constitutional: Negative for fever, chills, weight loss, malaise/fatigue. HEENT: Negative for nosebleeds, vision changes. Respiratory: No cough, hemoptysis, sputum production, and  wheezing. Cardiovascular: Negative for chest pain, palpitations, orthopnea, claudication, leg swelling, syncope, and PND. Gastrointestinal: Negative for nausea, vomiting, diarrhea, constipation, blood in stool and melena. Genitourinary: Negative for dysuria, and hematuria. Musculoskeletal: Negative for myalgias, arthralgia. Skin: Negative for rash. Heme: Does not bleed or bruise easily. Neurological: Negative for speech change and focal weakness     Objective:     Visit Vitals  /76   Pulse 64   Resp 12   Ht 5' 11\" (1.803 m)   Wt 219 lb 8 oz (99.6 kg)   SpO2 96%   BMI 30.61 kg/m²        Physical Exam:   Constitutional: Well-developed and well-nourished. No distress. Head: Normocephalic and atraumatic. Eyes: Pupils are equal, round  Neck: Supple. No JVD present. Cardiovascular: Normal rate, regular rhythm and 2/6 systolic murmur heard. Pulmonary/Chest: Lungs clear to auscultation bilaterally   Abdominal: Soft, no tenderness. Musculoskeletal: No edema. Neurological: Alert, oriented. Skin: Skin is warm and dry. ICD incision with small keloid scar, unremarkable site, slightly prominent along lateral edge. No swelling. Psychiatric: Normal mood and affect.  Behavior is normal. Judgment and thought content normal.      ECG (12/09/2021): Ventricular pacing with atrial sensing QRSd 162 ms. Assessment/Plan:     Imaging/Studies:  Echo (12/09/2021): LVEF estimated 35-40%. 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 Mild MR. ICD-10-CM ICD-9-CM    1. ICD (implantable cardioverter-defibrillator) in place  Z95.810 V45.02 CBC WITH AUTOMATED DIFF      METABOLIC PANEL, BASIC   2. Paroxysmal atrial fibrillation (HCC)  I48.0 427.31 CBC WITH AUTOMATED DIFF      METABOLIC PANEL, BASIC   3. NICM (nonischemic cardiomyopathy) (HCC)  I42.8 425.4 CBC WITH AUTOMATED DIFF      METABOLIC PANEL, BASIC   4. Pre-procedural laboratory examination  Z01.812 V72.63 CBC WITH AUTOMATED DIFF      METABOLIC PANEL, BASIC   5. H/O complete atrioventricular block  Z86.79 V12.59    6. Paroxysmal VT ([address]  I47.2 427.1    7. Corrected transposition of great vessels  Q20.5 745.12          [employer] dual chamber ICD (DOI 10/06/2011, RA lead 05/31/1995): Known complete AVB. Device reached HANS on 02/02/2022. Complete check on 12/09/2021 showed proper lead & generator function. RA 12%, %. No events noted since 11/19/2020. LVEF 35-40%, stable, but recommend upgrade to biventricular ICD now to avoid infection with future upgrade. Reviewed risks/benefits of upgrade to biventricular ICD. Shared decision making was utilized between the physician/provider and patient/family in regards to ICD generator implantation and therapy. He agrees to proceed as scheduled. Labs ordered. Reviewed Hibiclens. Discussed necessary COVID test.      NICM: LVEF 35-40% in 12/2021. NYHA I-II chronic systolic CHF. Continue GDMT as previously prescribed. Planning upgrade to biventricular pacemaker as noted above. PAF: No known recent recurrence. Last ECG in 12/2021, had wide QRS due to chronic RV pacing, but ok to continue flecainide as prescribed despite mild LVEF decline. This is the only AAD he tolerated.     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 Future Appointments   Date Time Provider Karan Georges   3/28/2022  2:15 PM REMOTE1, Angel Luis MICHAEL AMB   7/6/2022  8:30 AM REMOTE1, JJ MICHAEL AMB   10/10/2022 12:30 PM REMOTE1, JJ MICHAEL AMB   1/5/2023  1:00 PM PACEMAKER3, JJ MICHEAL AMB   1/5/2023  1:20 PM MD ROSE MARY Recinos BS AMB     Thank you for involving me in this patient's care and please call with further concerns or questions.     ABHI ChavezSyed  [facility] [place]  03/04/22

## 2022-02-28 NOTE — H&P (VIEW-ONLY)
Cardiac Electrophysiology Office Note     Subjective:      Greg Taylor is a 64 y.o. male patient who presents for H&P update prior to upcoming planned upgrade to biventricular ICD (scheduled for 03/18/2022), currently has [employer] dual chamber ICD (last gen change 10/06/2011, RV lead 10/06/2011, RA lead 05/31/1995). Current device reached HANS on 02/02/2022. Ventricular paced 100% due to complete AV block. NICM. LVEF 35-40% in 12/2021. NYHA I-II chronic systolic CHF. On appropriate GDMT. Continues flecainide. ECG in 12/2021 showed AS- rhythm; QRSd 162 ms due to ventricular pacing. He denies chest pain, palpitations, syncope, or edema. BP controlled. Previous: In 2015, switched from flecainide to amiodarone, but had visual & neuro side effects, switched back to flecainide. He declined Tikosyn. 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 Previously followed by Dr. Michael Lal, has since transferred back to [facility] office. Abnormal nuclear stress 2011 with cardiac cath 9/2011 that showed normal coronary arteries. Works as a persaud.     Patient Active Problem List    Diagnosis Date Noted    Moderate persistent asthma without complication 88/74/3888    ICD (implantable cardioverter-defibrillator) in place 08/12/2015    Paroxysmal VT ([facility] 09/13/2011    Paroxsymal atrial fibrillation 09/30/2010    H/O complete atrioventricular block     Sinoatrial node dysfunction ([facility]     Hypercholesterolemia without hypertriglyceridemia 08/25/2010    Hypertension, essential, benign 08/25/2010    History of colonoscopy with polypectomy 08/25/2010    Corrected transposition of great vessels 08/25/2010     Current Outpatient Medications   Medication Sig Dispense Refill    chlorhexidine (Hibiclens) 4 % liquid Apply to clean, dry skin daily x 5 days prior to ICD upgrade. 1 Each 0    flecainide (TAMBOCOR) 100 mg tablet Take 1 tablet by mouth twice daily 180 Tablet 1    metoprolol succinate (TOPROL-XL) 50 mg XL tablet Take 1 tablet by mouth once daily 90 Tablet 1    atorvastatin (LIPITOR) 40 mg tablet Take 1 tablet by mouth once daily 90 Tablet 0    enalapril (VASOTEC) 5 mg tablet Take 1 tablet by mouth twice daily 180 Tablet 1    multivitamin (ONE A DAY) tablet Take 1 Tab by mouth daily.  DULERA 200-5 mcg/actuation HFA inhaler INHALE 2 PUFFS BY MOUTH TWICE DAILY 1 Each 5    Cetirizine (ZYRTEC) 10 mg cap Take  by mouth daily.  aspirin (ASPIRIN) 325 mg tablet Take 1 Tab by mouth daily. 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  Pacemaker      Past Surgical History:   Procedure Laterality Date    CARDIAC CATHETERIZATION  9/13/2011    Hemodynamics - RA 5, RV 43/12 LVEDP12, RV sat 81% FA 98%, systemic ventricular function diminished, normal coronaries    COLONOSCOPY N/A 4/21/2017    COLONOS[facility] Werner Hernandez [facility] at [facility]  4/21/2017         HX APPENDECTOMY      HX HEART CATHETERIZATION      HX PACEMAKER  6/2008     [facility] [employer] # [id number].      Family History   Problem Relation Age of Onset    High Cholesterol Mother     COPD Mother     Diabetes Father     COPD Father     No Known Problems Sister     Heart Disease Other      Social History     Tobacco Use    Smoking status: Never Smoker    Smokeless tobacco: Former User   Substance Use Topics    Alcohol use: Yes     Alcohol/week: 30.0 standard drinks     Types: 12 Cans of beer, 18 Standard drinks or equivalent per week     Comment: 1-12 beers per week        Review of Systems:   Constitutional: Negative for fever, chills, weight loss, malaise/fatigue. HEENT: Negative for nosebleeds, vision changes. Respiratory: No cough, hemoptysis, sputum production, and  wheezing. Cardiovascular: Negative for chest pain, palpitations, orthopnea, claudication, leg swelling, syncope, and PND. Gastrointestinal: Negative for nausea, vomiting, diarrhea, constipation, blood in stool and melena. Genitourinary: Negative for dysuria, and hematuria. Musculoskeletal: Negative for myalgias, arthralgia. Skin: Negative for rash. Heme: Does not bleed or bruise easily. Neurological: Negative for speech change and focal weakness     Objective:     Visit Vitals  /76   Pulse 64   Resp 12   Ht 5' 11\" (1.803 m)   Wt 219 lb 8 oz (99.6 kg)   SpO2 96%   BMI 30.61 kg/m²        Physical Exam:   Constitutional: Well-developed and well-nourished. No distress. Head: Normocephalic and atraumatic. Eyes: Pupils are equal, round  Neck: Supple. No JVD present. Cardiovascular: Normal rate, regular rhythm and 2/6 systolic murmur heard. Pulmonary/Chest: Lungs clear to auscultation bilaterally   Abdominal: Soft, no tenderness. Musculoskeletal: No edema. Neurological: Alert, oriented. Skin: Skin is warm and dry. ICD incision with small keloid scar, unremarkable site, slightly prominent along lateral edge. No swelling. Psychiatric: Normal mood and affect.  Behavior is normal. Judgment and thought content normal.      ECG (12/09/2021): Ventricular pacing with atrial sensing QRSd 162 ms. Assessment/Plan:     Imaging/Studies:  Echo (12/09/2021): LVEF estimated 35-40%. 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 Mild MR. ICD-10-CM ICD-9-CM    1. ICD (implantable cardioverter-defibrillator) in place  Z95.810 V45.02 CBC WITH AUTOMATED DIFF      METABOLIC PANEL, BASIC   2. Paroxysmal atrial fibrillation (HCC)  I48.0 427.31 CBC WITH AUTOMATED DIFF      METABOLIC PANEL, BASIC   3. NICM (nonischemic cardiomyopathy) (HCC)  I42.8 425.4 CBC WITH AUTOMATED DIFF      METABOLIC PANEL, BASIC   4. Pre-procedural laboratory examination  Z01.812 V72.63 CBC WITH AUTOMATED DIFF      METABOLIC PANEL, BASIC   5. H/O complete atrioventricular block  Z86.79 V12.59    6. Paroxysmal VT ([address]  I47.2 427.1    7. Corrected transposition of great vessels  Q20.5 745.12          [employer] dual chamber ICD (DOI 10/06/2011, RA lead 05/31/1995): Known complete AVB. Device reached HANS on 02/02/2022. Complete check on 12/09/2021 showed proper lead & generator function. RA 12%, %. No events noted since 11/19/2020. LVEF 35-40%, stable, but recommend upgrade to biventricular ICD now to avoid infection with future upgrade. Reviewed risks/benefits of upgrade to biventricular ICD. Shared decision making was utilized between the physician/provider and patient/family in regards to ICD generator implantation and therapy. He agrees to proceed as scheduled. Labs ordered. Reviewed Hibiclens. Discussed necessary COVID test.      NICM: LVEF 35-40% in 12/2021. NYHA I-II chronic systolic CHF. Continue GDMT as previously prescribed. Planning upgrade to biventricular pacemaker as noted above. PAF: No known recent recurrence. Last ECG in 12/2021, had wide QRS due to chronic RV pacing, but ok to continue flecainide as prescribed despite mild LVEF decline. This is the only AAD he tolerated.     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 Future Appointments   Date Time Provider Karan Destini   3/28/2022  2:15 PM REMOTE1, Rita MICHAEL AMB   7/6/2022  8:30 AM REMOTE1, JJ MICHAEL AMB   10/10/2022 12:30 PM REMOTE1, JJ MICHAEL AMB   1/5/2023  1:00 PM PACEMAKER3, JJ MICHAEL AMB   1/5/2023  1:20 PM MD ROSE MARY Nichols BS AMB     Thank you for involving me in this patient's care and please call with further concerns or questions.     ABHI Moseley  [facility] [place]  03/04/22

## 2022-03-04 ENCOUNTER — OFFICE VISIT (OUTPATIENT)
Dept: CARDIOLOGY CLINIC | Age: 57
End: 2022-03-04
Payer: COMMERCIAL

## 2022-03-04 VITALS
WEIGHT: 219.5 LBS | BODY MASS INDEX: 30.73 KG/M2 | DIASTOLIC BLOOD PRESSURE: 76 MMHG | HEART RATE: 64 BPM | HEIGHT: 71 IN | OXYGEN SATURATION: 96 % | RESPIRATION RATE: 12 BRPM | SYSTOLIC BLOOD PRESSURE: 132 MMHG

## 2022-03-04 DIAGNOSIS — Z86.79 H/O COMPLETE ATRIOVENTRICULAR BLOCK: ICD-10-CM

## 2022-03-04 DIAGNOSIS — I48.0 PAROXYSMAL ATRIAL FIBRILLATION (HCC): ICD-10-CM

## 2022-03-04 DIAGNOSIS — Z01.812 PRE-PROCEDURAL LABORATORY EXAMINATION: ICD-10-CM

## 2022-03-04 DIAGNOSIS — Z95.810 ICD (IMPLANTABLE CARDIOVERTER-DEFIBRILLATOR) IN PLACE: Primary | ICD-10-CM

## 2022-03-04 DIAGNOSIS — Q20.5 CORRECTED TRANSPOSITION OF GREAT VESSELS: ICD-10-CM

## 2022-03-04 DIAGNOSIS — I47.29 PAROXYSMAL VT: ICD-10-CM

## 2022-03-04 DIAGNOSIS — I42.8 NICM (NONISCHEMIC CARDIOMYOPATHY) (HCC): ICD-10-CM

## 2022-03-04 PROCEDURE — 99214 OFFICE O/P EST MOD 30 MIN: CPT | Performed by: NURSE PRACTITIONER

## 2022-03-04 RX ORDER — CHLORHEXIDINE GLUCONATE 4 G/100ML
SOLUTION TOPICAL
Qty: 1 EACH | Refills: 0 | Status: SHIPPED | OUTPATIENT
Start: 2022-03-04 | End: 2022-03-18

## 2022-03-04 NOTE — PROGRESS NOTES
States wife tested positive for covid-4 weeks ago. His test came back negative. Chief Complaint   Patient presents with    Follow-up     Denies chest pain/shortness of breath/swelling/dizziness.       Visit Vitals  BP (!) 140/80 (BP 1 Location: Right upper arm, BP Patient Position: Sitting, BP Cuff Size: Adult)   Pulse 64   Resp 12   Ht 5' 11\" (1.803 m)   Wt 219 lb 8 oz (99.6 kg)   SpO2 96%   BMI 30.61 kg/m²

## 2022-03-08 ENCOUNTER — TELEPHONE (OUTPATIENT)
Dept: CARDIOLOGY CLINIC | Age: 57
End: 2022-03-08

## 2022-03-08 NOTE — TELEPHONE ENCOUNTER
Received fax from [facility] for short term disability for upcoming procedure. Permission to release information not signed. Will contact patient to obtain permission    Verified patient with two types of identifiers. Notified patient of fax received. Will send form to patient via [employer] for completion. Patient verbalized understanding and will call with any other questions.

## 2022-03-09 ENCOUNTER — TELEPHONE (OUTPATIENT)
Dept: CARDIOLOGY CLINIC | Age: 57
End: 2022-03-09

## 2022-03-09 RX ORDER — SODIUM CHLORIDE 0.9 % (FLUSH) 0.9 %
5-40 SYRINGE (ML) INJECTION EVERY 8 HOURS
Status: CANCELLED | OUTPATIENT
Start: 2022-03-09

## 2022-03-09 RX ORDER — SODIUM CHLORIDE 0.9 % (FLUSH) 0.9 %
5-40 SYRINGE (ML) INJECTION AS NEEDED
Status: CANCELLED | OUTPATIENT
Start: 2022-03-09

## 2022-03-09 NOTE — TELEPHONE ENCOUNTER
Faxed short term disability paper work to Natasha at fax number [phone]. Fax confirmation received.       Locally message sent to patient as wel

## 2022-03-14 ENCOUNTER — HOSPITAL ENCOUNTER (OUTPATIENT)
Dept: PREADMISSION TESTING | Age: 57
Discharge: HOME | End: 2022-03-14
Attending: INTERNAL MEDICINE
Payer: COMMERCIAL

## 2022-03-14 ENCOUNTER — TRANSCRIBE ORDER (OUTPATIENT)
Dept: REGISTRATION | Age: 57
End: 2022-03-14

## 2022-03-14 DIAGNOSIS — U07.1 COVID-19: Primary | ICD-10-CM

## 2022-03-14 DIAGNOSIS — U07.1 COVID-19: ICD-10-CM

## 2022-03-14 PROCEDURE — U0005 INFEC AGEN DETEC AMPLI PROBE: HCPCS

## 2022-03-15 LAB
SARS-COV-2, XPLCVT: NOT DETECTED
SOURCE, COVRS: NORMAL

## 2022-03-18 ENCOUNTER — HOSPITAL ENCOUNTER (OUTPATIENT)
Age: 57
Setting detail: OUTPATIENT SURGERY
Discharge: HOME | End: 2022-03-18
Attending: INTERNAL MEDICINE | Admitting: INTERNAL MEDICINE
Payer: COMMERCIAL

## 2022-03-18 ENCOUNTER — APPOINTMENT (OUTPATIENT)
Dept: GENERAL RADIOLOGY | Age: 57
End: 2022-03-18
Attending: NURSE PRACTITIONER
Payer: COMMERCIAL

## 2022-03-18 ENCOUNTER — TELEPHONE (OUTPATIENT)
Dept: CARDIOLOGY CLINIC | Age: 57
End: 2022-03-18

## 2022-03-18 VITALS
OXYGEN SATURATION: 95 % | TEMPERATURE: 98.5 F | RESPIRATION RATE: 17 BRPM | DIASTOLIC BLOOD PRESSURE: 66 MMHG | SYSTOLIC BLOOD PRESSURE: 105 MMHG | HEART RATE: 74 BPM

## 2022-03-18 DIAGNOSIS — Z95.810 BIVENTRICULAR ICD (IMPLANTABLE CARDIOVERTER-DEFIBRILLATOR) IN PLACE: ICD-10-CM

## 2022-03-18 DIAGNOSIS — I42.0 CARDIOMYOPATHY, DILATED (HCC): ICD-10-CM

## 2022-03-18 DIAGNOSIS — Z86.79 H/O COMPLETE ATRIOVENTRICULAR BLOCK: ICD-10-CM

## 2022-03-18 PROBLEM — R93.1 LVEF <40%: Status: ACTIVE | Noted: 2022-03-18

## 2022-03-18 PROCEDURE — 77030018729 HC ELECTRD DEFIB PAD CARD -B: Performed by: INTERNAL MEDICINE

## 2022-03-18 PROCEDURE — C1882 AICD, OTHER THAN SING/DUAL: HCPCS | Performed by: INTERNAL MEDICINE

## 2022-03-18 PROCEDURE — C1887 CATHETER, GUIDING: HCPCS | Performed by: INTERNAL MEDICINE

## 2022-03-18 PROCEDURE — C1781 MESH (IMPLANTABLE): HCPCS | Performed by: INTERNAL MEDICINE

## 2022-03-18 PROCEDURE — C1900 LEAD, CORONARY VENOUS: HCPCS | Performed by: INTERNAL MEDICINE

## 2022-03-18 PROCEDURE — 33225 L VENTRIC PACING LEAD ADD-ON: CPT | Performed by: INTERNAL MEDICINE

## 2022-03-18 PROCEDURE — 2709999900 HC NON-CHARGEABLE SUPPLY: Performed by: INTERNAL MEDICINE

## 2022-03-18 PROCEDURE — C1892 INTRO/SHEATH,FIXED,PEEL-AWAY: HCPCS | Performed by: INTERNAL MEDICINE

## 2022-03-18 PROCEDURE — A4565 SLINGS: HCPCS | Performed by: INTERNAL MEDICINE

## 2022-03-18 PROCEDURE — 71045 X-RAY EXAM CHEST 1 VIEW: CPT

## 2022-03-18 PROCEDURE — 77030040934 HC CATH DIAG DXTERITY MEDT -A: Performed by: INTERNAL MEDICINE

## 2022-03-18 PROCEDURE — 77030018547 HC SUT ETHBND1 J&J -B: Performed by: INTERNAL MEDICINE

## 2022-03-18 PROCEDURE — C1733 CATH, EP, OTHR THAN COOL-TIP: HCPCS | Performed by: INTERNAL MEDICINE

## 2022-03-18 PROCEDURE — 74011250636 HC RX REV CODE- 250/636: Performed by: INTERNAL MEDICINE

## 2022-03-18 PROCEDURE — 99153 MOD SED SAME PHYS/QHP EA: CPT | Performed by: INTERNAL MEDICINE

## 2022-03-18 PROCEDURE — 93642 EP EVL 1/2CHMB TRNSVNS CVDFB: CPT | Performed by: INTERNAL MEDICINE

## 2022-03-18 PROCEDURE — 74011000250 HC RX REV CODE- 250: Performed by: INTERNAL MEDICINE

## 2022-03-18 PROCEDURE — 93641 EP EVL 1/2CHMB PAC CVDFB TST: CPT | Performed by: INTERNAL MEDICINE

## 2022-03-18 PROCEDURE — C1751 CATH, INF, PER/CENT/MIDLINE: HCPCS | Performed by: INTERNAL MEDICINE

## 2022-03-18 PROCEDURE — C1769 GUIDE WIRE: HCPCS | Performed by: INTERNAL MEDICINE

## 2022-03-18 PROCEDURE — 77030032060 HC PWDR HEMSTAT ARISTA ASRB 3GM BARD -C: Performed by: INTERNAL MEDICINE

## 2022-03-18 PROCEDURE — 99152 MOD SED SAME PHYS/QHP 5/>YRS: CPT | Performed by: INTERNAL MEDICINE

## 2022-03-18 PROCEDURE — 77030022704 HC SUT VLOC COVD -B: Performed by: INTERNAL MEDICINE

## 2022-03-18 PROCEDURE — 33264 RMVL & RPLCMT DFB GEN MLT LD: CPT | Performed by: INTERNAL MEDICINE

## 2022-03-18 PROCEDURE — 33223 RELOCATE POCKET FOR DEFIB: CPT | Performed by: INTERNAL MEDICINE

## 2022-03-18 PROCEDURE — 77030041075 HC DRSG AG OPTIFRM MDII -B: Performed by: INTERNAL MEDICINE

## 2022-03-18 PROCEDURE — 74011000636 HC RX REV CODE- 636: Performed by: INTERNAL MEDICINE

## 2022-03-18 DEVICE — CARDIAC RESYNCHRONIZATION THERAPY DEFIBRILLATOR
Type: IMPLANTABLE DEVICE | Status: FUNCTIONAL
Brand: MOMENTUM™ X4 CRT-D

## 2022-03-18 DEVICE — PACE/SENSE LEAD
Type: IMPLANTABLE DEVICE | Status: FUNCTIONAL
Brand: ACUITY™ X4 STRAIGHT

## 2022-03-18 DEVICE — ENVELOPE CMRM6133 ABSORB LRG MR
Type: IMPLANTABLE DEVICE | Status: FUNCTIONAL
Brand: TYRX™

## 2022-03-18 RX ORDER — ACETAMINOPHEN 325 MG/1
650 TABLET ORAL
Status: DISCONTINUED | OUTPATIENT
Start: 2022-03-18 | End: 2022-03-18 | Stop reason: HOSPADM

## 2022-03-18 RX ORDER — HYDROCODONE BITARTRATE AND ACETAMINOPHEN 5; 325 MG/1; MG/1
1 TABLET ORAL
Status: DISCONTINUED | OUTPATIENT
Start: 2022-03-18 | End: 2022-03-18 | Stop reason: HOSPADM

## 2022-03-18 RX ORDER — VANCOMYCIN/0.9 % SOD CHLORIDE 1.5G/250ML
1500 PLASTIC BAG, INJECTION (ML) INTRAVENOUS ONCE
Status: COMPLETED | OUTPATIENT
Start: 2022-03-18 | End: 2022-03-18

## 2022-03-18 RX ORDER — SODIUM CHLORIDE 0.9 % (FLUSH) 0.9 %
5-40 SYRINGE (ML) INJECTION EVERY 8 HOURS
Status: DISCONTINUED | OUTPATIENT
Start: 2022-03-18 | End: 2022-03-18 | Stop reason: HOSPADM

## 2022-03-18 RX ORDER — SODIUM CHLORIDE 9 MG/ML
500 INJECTION, SOLUTION INTRAVENOUS CONTINUOUS
Status: DISCONTINUED | OUTPATIENT
Start: 2022-03-18 | End: 2022-03-18 | Stop reason: HOSPADM

## 2022-03-18 RX ORDER — SODIUM CHLORIDE 0.9 % (FLUSH) 0.9 %
5-40 SYRINGE (ML) INJECTION AS NEEDED
Status: DISCONTINUED | OUTPATIENT
Start: 2022-03-18 | End: 2022-03-18 | Stop reason: HOSPADM

## 2022-03-18 RX ORDER — MIDAZOLAM HYDROCHLORIDE 1 MG/ML
.5-1 INJECTION, SOLUTION INTRAMUSCULAR; INTRAVENOUS
Status: DISCONTINUED | OUTPATIENT
Start: 2022-03-18 | End: 2022-03-18 | Stop reason: HOSPADM

## 2022-03-18 RX ORDER — LIDOCAINE HYDROCHLORIDE 10 MG/ML
10-30 INJECTION, SOLUTION EPIDURAL; INFILTRATION; INTRACAUDAL; PERINEURAL ONCE
Status: COMPLETED | OUTPATIENT
Start: 2022-03-18 | End: 2022-03-18

## 2022-03-18 RX ORDER — ONDANSETRON 2 MG/ML
4 INJECTION INTRAMUSCULAR; INTRAVENOUS
Status: DISCONTINUED | OUTPATIENT
Start: 2022-03-18 | End: 2022-03-18 | Stop reason: HOSPADM

## 2022-03-18 RX ORDER — FENTANYL CITRATE 50 UG/ML
25-200 INJECTION, SOLUTION INTRAMUSCULAR; INTRAVENOUS
Status: DISCONTINUED | OUTPATIENT
Start: 2022-03-18 | End: 2022-03-18 | Stop reason: HOSPADM

## 2022-03-18 RX ORDER — LIDOCAINE HYDROCHLORIDE 10 MG/ML
INJECTION INFILTRATION; PERINEURAL AS NEEDED
Status: DISCONTINUED | OUTPATIENT
Start: 2022-03-18 | End: 2022-03-18 | Stop reason: HOSPADM

## 2022-03-18 RX ADMIN — SODIUM CHLORIDE 500 ML: 9 INJECTION, SOLUTION INTRAVENOUS at 07:31

## 2022-03-18 NOTE — DISCHARGE INSTRUCTIONS
Patient Instructions Post-ICD Implant  Stop aspirin until Monday    1. No heavy lifting or exercises with the left arm for 4 weeks. This includes the following:  Do not raise arm above the shoulder level to comb hair, pull on clothes, etc... Do not use the affected arm to pull up or push up from a seated or laying  down position. Do not allow anyone else to pull on the affected arm. 2.  With the Aquacel dressing in place, you may shower. 3.  You may remove your dressing in 1 week, or it can be removed in clinic at follow up if you prefer. 4.  Do not rub or massage your ICD site. 5.  Do not drive for 2 days. 6.  Call Dr. Rosemarie Callaway at [phone] if you experience any of the following symptoms:  Redness at the ICD site  Swelling at or around the ICD or in the left arm  Pain around the ICD  Dizziness, lightheadedness, fainting spells  Lack of energy  Shortness of breath  Rapid heart rate  Chest or muscle twitches    7. Call Dr. Rosemarie Callaway at [phone] if your ICD delivers a shock. The physician may   or may not want to see you in the office (that decision will be made when you call). 8.  Follow-up with Dr. Darlene Coronado office for wound check on 03/31/2022 at 11AM.    Future Appointments   Date Time Provider Karan Georges   3/28/2022  2:15 PM REMOTE1, Cathy BAZZI BS AMB   3/31/2022 11:00 AM PACEMAKER3, JJ BAZZI BS AMB   3/31/2022 11:30 AM WOUND CHECKS, JJ BAZZI BS AMB   7/6/2022  8:30 AM REMOTE1, JJ DUREY BS AMB   10/10/2022 12:30 PM REMOTE1, JJ BAZZI BS AMB   1/5/2023  1:00 PM PACEMAKER3, JJ BAZZI BS AMB   1/5/2023  1:20 PM MD ROSE MARY Schuler BS AMB     9. You may use Tylenol and/or ice for discomfort. You may use the sling as provided. De Archer M.D.  [facility]  Electrophysiology/Cardiology  [facility] [place]  [address], [address], [address]  [address], [facility] [address] [facility] [address] [address]  [phone]

## 2022-03-18 NOTE — INTERVAL H&P NOTE
Update History & Physical    The Patient's History and Physical of March 4, 2022 was reviewed with the patient and I examined the patient. There was no change. The surgical site was confirmed by the patient and me. Plan:  The risk, benefits, expected outcome, and alternative to the recommended procedure have been discussed with the patient. Patient understands and wants to proceed with the procedure.     Electronically signed by Errol Loya MD on 3/18/2022 at 7:35 AM

## 2022-03-18 NOTE — PROGRESS NOTES
Cardiac Cath Lab Recovery Arrival Note:      Eulene Merlin arrived to Cardiac Cath Lab, Recovery Area. Staff introduced to patient. Patient identifiers verified with NAME and DATE OF BIRTH. Procedure verified with patient. Consent forms reviewed and signed by patient or authorized representative and verified. Allergies verified. Patient and family oriented to department. Patient and family informed of procedure and plan of care. Questions answered with review. Patient prepped for procedure, per orders from physician, prior to arrival.    Patient on cardiac monitor, non-invasive blood pressure, SPO2 monitor. On RA. Patient is A&Ox 4. Patient reports no complaints. Patient in stretcher, in low position, with side rails up, call bell within reach, patient instructed to call if assistance as needed. Patient prep in: [facility] Recovery Area, [facility].    Patient family has pager # Phone [phone]  Family in: hospital.   Prep by: CC

## 2022-03-18 NOTE — Clinical Note
TRANSFER - OUT REPORT:     Verbal report given to: 1145, (at bedside). Report consisted of patient's Situation, Background, Assessment and   Recommendations(SBAR). Opportunity for questions and clarification was provided. Patient transported with a Registered Nurse.

## 2022-03-18 NOTE — PROGRESS NOTES
TRANSFER - IN REPORT:    Verbal report received from Electrophysiology Study on Ivan Johnson  being received from [facility] for routine progression of care. Report consisted of patients Situation, Background, Assessment and Recommendations(SBAR). Information from the following report(s) Kardex and Procedure Summary was reviewed with the receiving clinician. Opportunity for questions and clarification was provided. Assessment completed upon patients arrival to [address] [place] [address] and care assumed. Cardiac Cath Lab Recovery Arrival Note:    Ivan Johnson arrived to [facility] recovery area. Patient procedure= Bi V ICD . Patient on cardiac monitor, non-invasive blood pressure, SPO2 monitor. On  O2 @ 2 lpm via NC.  IV  of NS on pump at 25 ml/hr. Patient status doing well without problems. Patient is A&Ox 3. Patient reports no pain. PROCEDURE SITE CHECK:    Procedure site:without any bleeding and no hematoma, No pain/discomfort reported at procedure site. No change in patient status. Continue to monitor patient and status.

## 2022-03-18 NOTE — TELEPHONE ENCOUNTER
Subhash Griffiths from the [facility] calling to receive the CPT code for the patient's procedure scheduled for today, please advise        [facility]  [phone]  Fax [phone]

## 2022-03-18 NOTE — PROGRESS NOTES
TRANSFER - OUT REPORT:  Verbal report given to Karsten Brown RN on Willy Cea being transferred to [facility] for routine post - op   Report consisted of patients Situation, Background, Assessment and Recommendations(SBAR). Information from the following report(s) SBAR, Procedure Summary, Intake/Output, MAR, Recent Results, Med Rec Status and Cardiac Rhythm V paced 70's was reviewed with the receiving nurse. Opportunity for questions and clarification was provided.

## 2022-03-18 NOTE — PROGRESS NOTES
Patient admitted to Cardiac cath lab room 3 via stretcher. Patient name and date of birth verified. On room air. No complaints of pain at this time. Dr. Franca Kang to perform procedure.

## 2022-03-18 NOTE — PROCEDURES
Cardiac Procedure Note   Patient: Ian Gaspar  MRN: [id number]  SSN: [id number]   YOB: 1965 Age: 64 y.o.   Sex: male    Date of Procedure: 3/18/2022   Pre-procedure Diagnosis: dual chamber icd, complete av block, dilated cardiomyopathy, LVEF 40%  Post-procedure Diagnosis: same  Procedure: ICD Insertion and BIV Insertion  DFT  :  Dr. John Richey MD    Assistant(s):  None  Anesthesia: Moderate Sedation   Estimated Blood Loss: Less than 30 mL he is on high dose aspirin  Specimens Removed: [employer] ICD generator  Findings: venogram showed patent left axillary vein but tortuous  Correct transposition of great vessels  LV lead in anterolateral branch  DFT 26 J  Complications: None   Implants: [employer] BIV ICD  Signed by:  John Richey MD  3/18/2022  12:04 PM

## 2022-03-19 PROBLEM — J45.40 MODERATE PERSISTENT ASTHMA WITHOUT COMPLICATION: Status: ACTIVE | Noted: 2017-01-04

## 2022-03-21 ENCOUNTER — HOSPITAL ENCOUNTER (OUTPATIENT)
Dept: GENERAL RADIOLOGY | Age: 57
Discharge: HOME | End: 2022-03-21
Attending: NURSE PRACTITIONER
Payer: COMMERCIAL

## 2022-03-21 ENCOUNTER — TELEPHONE (OUTPATIENT)
Dept: CARDIOLOGY CLINIC | Age: 57
End: 2022-03-21

## 2022-03-21 ENCOUNTER — CLINICAL SUPPORT (OUTPATIENT)
Dept: CARDIOLOGY CLINIC | Age: 57
End: 2022-03-21
Attending: NURSE PRACTITIONER

## 2022-03-21 DIAGNOSIS — Z95.810 ICD (IMPLANTABLE CARDIOVERTER-DEFIBRILLATOR) IN PLACE: Primary | ICD-10-CM

## 2022-03-21 DIAGNOSIS — Z95.810 ICD (IMPLANTABLE CARDIOVERTER-DEFIBRILLATOR) IN PLACE: ICD-10-CM

## 2022-03-21 DIAGNOSIS — E78.00 HYPERCHOLESTEROLEMIA WITHOUT HYPERTRIGLYCERIDEMIA: ICD-10-CM

## 2022-03-21 PROCEDURE — 71046 X-RAY EXAM CHEST 2 VIEWS: CPT

## 2022-03-21 RX ORDER — ATORVASTATIN CALCIUM 40 MG/1
TABLET, FILM COATED ORAL
Qty: 90 TABLET | Refills: 0 | Status: SHIPPED | OUTPATIENT
Start: 2022-03-21 | End: 2022-06-20

## 2022-03-21 NOTE — TELEPHONE ENCOUNTER
Patient just had pacemaker put in on 3/18/22 and it is having zaps in his side and also painful heartbeats, he would like to come in and be seen either today or tomorrow, please advise        [phone]

## 2022-03-21 NOTE — TELEPHONE ENCOUNTER
Verified patient with two types of identifiers. Patient states he had this before with his original implant. Notified diaphragmatic stimulation is even more common with the BiV devices. Patient states Dr. Wilmer Gu did mention that they may just have to turn off the LV lead. Will bring patient in for pacemaker evaluation. Patient verbalized understanding and will call with any other questions.       Future Appointments   Date Time Provider Karan Georges   3/21/2022 11:00 AM PACEMAKER3, JJ BAZZI BS AMB   3/28/2022  2:15 PM REMOTE1, JJ BAZZI BS AMB   3/31/2022 11:00 AM PACEMAKER3, JJ BAZZI BS AMB   3/31/2022 11:30 AM WOUND CHECKS, JJ BAZZI BS AMB   7/6/2022  8:30 AM REMOTE1, JJ BAZZI BS AMB   10/10/2022 12:30 PM REMOTE1, JJ BAZZI BS AMB   1/5/2023  1:00 PM PACEMAKER3, JJ BAZZI BS AMB   1/5/2023  1:20 PM MD ROSE MARY Anderson BS AMB

## 2022-03-21 NOTE — TELEPHONE ENCOUNTER
Patient was sent the wrong unit. Says it should be 06036 but has [id number]. Thanx confirmed he needed a new one sent.      Patient [phone]

## 2022-03-22 ENCOUNTER — PATIENT MESSAGE (OUTPATIENT)
Dept: CARDIOLOGY CLINIC | Age: 57
End: 2022-03-22

## 2022-03-22 ENCOUNTER — TELEPHONE (OUTPATIENT)
Dept: CARDIOLOGY CLINIC | Age: 57
End: 2022-03-22

## 2022-03-24 ENCOUNTER — TELEPHONE (OUTPATIENT)
Dept: CARDIOLOGY CLINIC | Age: 57
End: 2022-03-24

## 2022-03-24 PROBLEM — Z95.810 BIVENTRICULAR ICD (IMPLANTABLE CARDIOVERTER-DEFIBRILLATOR) IN PLACE: Status: ACTIVE | Noted: 2022-03-18

## 2022-03-24 PROBLEM — R93.1 LVEF <40%: Status: ACTIVE | Noted: 2022-03-18

## 2022-03-24 PROBLEM — I42.0 DILATED CARDIOMYOPATHY (HCC): Status: ACTIVE | Noted: 2022-03-18

## 2022-03-24 NOTE — PROGRESS NOTES
LV lead in good position  Stimulation of left phrenic nerve occurred in recovery area and [address] rep had said he could not optimize more.   I asked the patient if he wanted to leave it off for 2 weeks to see if it changes later but he said it was ok to leave on as it was milder

## 2022-03-24 NOTE — TELEPHONE ENCOUNTER
----- Message from Ariel Kee MD sent at 3/24/2022  3:43 AM EDT -----  LV lead in good position  Stimulation of left phrenic nerve occurred in recovery area and [address] rep had said he could not optimize more.   I asked the patient if he wanted to leave it off for 2 weeks to see if it changes later but he said it was ok to leave on as it was milder

## 2022-04-04 DIAGNOSIS — J45.40 MODERATE PERSISTENT ASTHMA WITHOUT COMPLICATION: ICD-10-CM

## 2022-04-04 NOTE — TELEPHONE ENCOUNTER
Requested Prescriptions     Pending Prescriptions Disp Refills    mometasone-formoterol (Dulera) 200-5 mcg/actuation HFA inhaler 1 Each 5     Sig: Take 2 Puffs by inhalation two (2) times a day.

## 2022-04-04 NOTE — TELEPHONE ENCOUNTER
PCP: Flavio Colindres NP     Last appt: 6/21/2021   Future Appointments   Date Time Provider Karan Helleri   4/14/2022  1:40 PM PACEMAKER3, JJ BAZZI BS AMB        Requested Prescriptions     Pending Prescriptions Disp Refills    mometasone-formoterol (Dulera) 200-5 mcg/actuation HFA inhaler 1 Each 5     Sig: Take 2 Puffs by inhalation two (2) times a day.

## 2022-04-05 ENCOUNTER — VIRTUAL VISIT (OUTPATIENT)
Dept: INTERNAL MEDICINE CLINIC | Age: 57
End: 2022-04-05
Payer: COMMERCIAL

## 2022-04-05 DIAGNOSIS — J45.41 MODERATE PERSISTENT ASTHMA WITH ACUTE EXACERBATION: Primary | ICD-10-CM

## 2022-04-05 DIAGNOSIS — Z95.0 S/P PLACEMENT OF CARDIAC PACEMAKER: ICD-10-CM

## 2022-04-05 PROCEDURE — 99213 OFFICE O/P EST LOW 20 MIN: CPT | Performed by: NURSE PRACTITIONER

## 2022-04-05 RX ORDER — PREDNISONE 20 MG/1
TABLET ORAL
Qty: 10 TABLET | Refills: 0 | Status: SHIPPED | OUTPATIENT
Start: 2022-04-05 | End: 2022-09-01

## 2022-04-05 NOTE — PATIENT INSTRUCTIONS
Asthma in Adults: Care Instructions  Overview     Asthma makes it hard for you to breathe. During an asthma attack, the airways swell and narrow. Severe asthma attacks can be dangerous, but you can usually prevent them. Controlling asthma and treating symptoms before they get bad can help you avoid bad attacks. You may also avoid future trips to the doctor. Follow-up care is a key part of your treatment and safety. Be sure to make and go to all appointments, and call your doctor if you are having problems. It's also a good idea to know your test results and keep a list of the medicines you take. How can you care for yourself at home? · Follow your asthma action plan so you can manage your symptoms at home. An asthma action plan will help you prevent and control airway reactions and will tell you what to do during an asthma attack. If you do not have an asthma action plan, work with your doctor to build one. · Take your asthma medicine exactly as prescribed. Medicine plays an important role in controlling asthma. Talk to your doctor right away if you have any questions about what to take and how to take it. ? Use your quick-relief medicine when you have symptoms of an asthma attack. Some people need to use quick-relief medicine before they exercise to prevent asthma symptoms. Albuterol is a quick-relief medicine that is often used. In some cases, a certain type of controller inhaler is used as a quick-relief medicine. Ask your doctor what to use for quick relief. ? Take your controller medicine. If you have symptoms often, you will likely need to take it every day. Controller medicine usually includes an inhaled corticosteroid. The goal is to prevent problems before they occur. ? If your doctor prescribed corticosteroid pills to use during an attack, take them as directed. They may take hours to work, but they may shorten the attack and help you breathe better. ?  Keep your quick-relief medicine with you at all times. · Talk to your doctor before using other medicines. Some medicines, such as aspirin, can cause asthma attacks in some people. · Check yourself for asthma symptoms to know which step to follow in your action plan. Watch for things like being short of breath, having chest tightness, coughing, and wheezing. Also notice if symptoms wake you up at night or if you get tired quickly when you exercise. · If you have a peak flow meter, use it to check how well you are breathing. This can help you predict when an asthma attack is going to occur. Then you can take medicine to prevent the asthma attack or make it less severe. · See your doctor regularly. These visits will help you learn more about asthma and what you can do to control it. Your doctor will monitor your treatment to make sure the medicine is helping you. · Keep track of your asthma attacks and your treatment. After you have had an attack, write down what triggered it, what helped end it, and any concerns you have about your asthma action plan. Take your diary when you see your doctor. You can then review your asthma action plan and decide if it is working. · Do not smoke or allow others to smoke around you. Avoid smoky places. Smoking makes asthma worse. If you need help quitting, talk to your doctor about stop-smoking programs and medicines. These can increase your chances of quitting for good. · Learn what triggers an asthma attack for you, and avoid the triggers when you can. Common triggers include colds, smoke, air pollution, dust, pollen, mold, pets, cockroaches, stress, and cold air. · Avoid colds and the flu. Talk to your doctor about getting a pneumococcal vaccine shot. If you have had one before, ask your doctor whether you need a second dose. Get a flu vaccine every fall. If you must be around people with colds or the flu, wash your hands often. When should you call for help?    Call 911 anytime you think you may need emergency care. For example, call if:    · You have severe trouble breathing. Call your doctor now or seek immediate medical care if:    · Your symptoms do not get better after you have followed your asthma action plan.     · You cough up yellow, dark brown, or bloody mucus (sputum). Watch closely for changes in your health, and be sure to contact your doctor if:    · Your coughing and wheezing get worse.     · You need to use quick-relief medicine on more than 2 days a week within a month (unless it is just for exercise).     · You need help figuring out what is triggering your asthma attacks. Where can you learn more? Go to http://www.gray.com/  Enter P597 in the search box to learn more about \"Asthma in Adults: Care Instructions. \"  Current as of: July 6, 2021               Content Version: 13.2  © 6556-5681 Healthwise, Incorporated. Care instructions adapted under license by [employer] (which disclaims liability or warranty for this information). If you have questions about a medical condition or this instruction, always ask your healthcare professional. [address] any warranty or liability for your use of this information.

## 2022-04-05 NOTE — PROGRESS NOTES
Carolina Larsen  Identified pt with two pt identifiers(name and ). Chief Complaint   Patient presents with    Asthma     attacks have increased since the surgery // Paula Cloud // pain - 0        Reviewed record In preparation for visit and have obtained necessary documentation. 1. Have you been to the ER, urgent care clinic or hospitalized since your last visit? No     2. Have you seen or consulted any other health care providers outside of the [address] [place] [address] since your last visit? Include any pap smears or colon screening. Yes. Pacemaker Implant Upgrade - Dr. Marsha Butt    Patient does not have an advance directive. Vitals reviewed with provider. Health Maintenance reviewed:     Health Maintenance Due   Topic    Shingrix Vaccine Age 49> (2 of 2)    Lipid Screen           Wt Readings from Last 3 Encounters:   22 219 lb 8 oz (99.6 kg)   21 231 lb (104.8 kg)   21 231 lb (104.8 kg)        Temp Readings from Last 3 Encounters:   22 98.5 °F (36.9 °C)   21 97.8 °F (36.6 °C) (Oral)   20 98.4 °F (36.9 °C) (Oral)        BP Readings from Last 3 Encounters:   22 105/66   22 132/76   21 120/60        Pulse Readings from Last 3 Encounters:   22 74   22 64   21 80      There were no vitals filed for this visit. Learning Assessment:   :       Learning Assessment 2014   PRIMARY LEARNER Patient   PRIMARY LANGUAGE ENGLISH   LEARNER PREFERENCE PRIMARY READING     DEMONSTRATION     OTHER (COMMENT)   ANSWERED BY patient   RELATIONSHIP SELF        Depression Screening:   :       3 most recent PHQ Screens 2022   Little interest or pleasure in doing things Not at all   Feeling down, depressed, irritable, or hopeless Not at all   Total Score PHQ 2 0        Fall Risk Assessment:   :     No flowsheet data found. Abuse Screening:   :     No flowsheet data found.      ADL Screening:   :       ADL Assessment 2022   Feeding yourself No Help Needed   Getting from bed to chair No Help Needed   Getting dressed No Help Needed   Bathing or showering No Help Needed   Walk across the room (includes cane/walker) No Help Needed   Using the telphone No Help Needed   Taking your medications No Help Needed   Preparing meals No Help Needed   Managing money (expenses/bills) No Help Needed   Moderately strenuous housework (laundry) No Help Needed   Shopping for personal items (toiletries/medicines) No Help Needed   Shopping for groceries No Help Needed   Driving No Help Needed   Climbing a flight of stairs No Help Needed   Getting to places beyond walking distances No Help Needed

## 2022-04-05 NOTE — PROGRESS NOTES
Donald Casanova is a 64 y.o. male who was seen by synchronous (real-time) audio-video technology on 4/5/2022 for Asthma (attacks have increased since the surgery // dulera // pain - 0 )        Assessment & Plan:     Diagnoses and all orders for this visit:    1. Moderate persistent asthma with acute exacerbation  -     mometasone-formoterol (Dulera) 200-5 mcg/actuation HFA inhaler; Take 2 Puffs by inhalation two (2) times a day. -     predniSONE (DELTASONE) 20 mg tablet; Take 2 tabs daily for 5 days. Treat acute flare and start steroid inhaler- likely will need spring and fall  Continue albuterol as needed  Call if sx not improving or worsening    2. S/P placement of cardiac pacemaker  Recommend getting ok from [facility] to take prednisone s/p pacemaker implantation     Follow-up and Dispositions    · Return if symptoms worsen or fail to improve, for fu this summer for asthma, XOL, labs. Routing History             Subjective:     Pt requesting refill on steroid inhaler. Last seen by me in 2020. Has noticed more asthma attacks at night, coughing up sputum, has drainage down throat, worse night was last night waking up needing inhaler. Symptoms started just prior to getting pacemaker. Gets coughing fits, uses albuterol which helps. Has also noticed wheezing. Using albuterol every 3 hours. Hasn't used dulera in many years. Normally only gets flare ups in spring and fall. Had recent pacemaker implanted with Dr. Staci Carbajal. States having issue with wire, has discussed with [facility]. Has fu with [facility] 4-14-22. Prior to Admission medications    Medication Sig Start Date End Date Taking?  Authorizing Provider   enalapril (VASOTEC) 5 mg tablet Take 1 tablet by mouth twice daily 4/4/22  Yes Henri Howe NP   atorvastatin (LIPITOR) 40 mg tablet Take 1 tablet by mouth once daily 3/21/22  Yes Ventura Littlejohn NP   flecainide (TAMBOCOR) 100 mg tablet Take 1 tablet by mouth twice daily 2/28/22  Yes Trish Carrasco NP   metoprolol succinate (TOPROL-XL) 50 mg XL tablet Take 1 tablet by mouth once daily 2/28/22  Yes Trish Carrasco NP   multivitamin (ONE A DAY) tablet Take 1 Tab by mouth daily. Yes Provider, Historical   DULERA 200-5 mcg/actuation HFA inhaler INHALE 2 PUFFS BY MOUTH TWICE DAILY 12/1/19  Yes Kavita Trevino MD   Cetirizine (ZYRTEC) 10 mg cap Take  by mouth daily. Yes Provider, Historical   albuterol (VENTOLIN HFA) 90 mcg/actuation inhaler INHALE TWO PUFFS BY MOUTH EVERY 4 HOURS AS NEEDED FOR  WHEEZING 4/4/17  Yes Kavita Trevino MD         ROS  See hpi  This visit was completed virtually using doxy. me     Objective:     Patient-Reported Vitals 4/5/2022   Patient-Reported Weight 215lb   Patient-Reported Systolic  233   Patient-Reported Diastolic 74        [INSTRUCTIONS:  \"[x]\" Indicates a positive item  \"[]\" Indicates a negative item  -- DELETE ALL ITEMS NOT EXAMINED]    Constitutional: [x] Appears well-developed and well-nourished [x] No apparent distress      [] Abnormal -     Mental status: [x] Alert and awake  [x] Oriented to person/place/time [x] Able to follow commands    [] Abnormal -     Eyes:   EOM    [x]  Normal    [] Abnormal -   Sclera  [x]  Normal    [] Abnormal -          Discharge [x]  None visible   [] Abnormal -     HENT: [x] Normocephalic, atraumatic  [] Abnormal -   [x] Mouth/Throat: Mucous membranes are moist    External Ears [x] Normal  [] Abnormal -    Neck: [x] No visualized mass [] Abnormal -     Pulmonary/Chest: [x] Respiratory effort normal   [x] No visualized signs of difficulty breathing or respiratory distress        [] Abnormal -      Musculoskeletal:   [x] Normal gait with no signs of ataxia         [x] Normal range of motion of neck        [] Abnormal -     Neurological:        [x] No Facial Asymmetry (Cranial nerve 7 motor function) (limited exam due to video visit)          [x] No gaze palsy        [] Abnormal -          Skin:        [x] No significant exanthematous lesions or discoloration noted on facial skin         [] Abnormal -            Psychiatric:       [x] Normal Affect [] Abnormal -        [x] No Hallucinations    Other pertinent observable physical exam findings:-        We discussed the expected course, resolution and complications of the diagnosis(es) in detail. Medication risks, benefits, costs, interactions, and alternatives were discussed as indicated. I advised him to contact the office if his condition worsens, changes or fails to improve as anticipated. He expressed understanding with the diagnosis(es) and plan. Errol Holcomb, was evaluated through a synchronous (real-time) audio-video encounter. The patient (or guardian if applicable) is aware that this is a billable service, which includes applicable co-pays. Verbal consent to proceed has been obtained. The visit was conducted pursuant to the emergency declaration under the [address] [place] [address], [address] [place] [address] authority and the [employer] General Act. Patient identification was verified, and a caregiver was present when appropriate. The patient was located at home in a state where the provider was licensed to provide care.       Maryam Cool NP

## 2022-04-14 ENCOUNTER — CLINICAL SUPPORT (OUTPATIENT)
Dept: CARDIOLOGY CLINIC | Age: 57
End: 2022-04-14
Payer: COMMERCIAL

## 2022-04-14 DIAGNOSIS — Z95.810 BIVENTRICULAR ICD (IMPLANTABLE CARDIOVERTER-DEFIBRILLATOR) IN PLACE: Primary | ICD-10-CM

## 2022-04-14 PROCEDURE — 93284 PRGRMG EVAL IMPLANTABLE DFB: CPT | Performed by: INTERNAL MEDICINE

## 2022-04-18 ENCOUNTER — PATIENT MESSAGE (OUTPATIENT)
Dept: CARDIOLOGY CLINIC | Age: 57
End: 2022-04-18

## 2022-04-18 ENCOUNTER — TELEPHONE (OUTPATIENT)
Dept: CARDIOLOGY CLINIC | Age: 57
End: 2022-04-18

## 2022-04-18 NOTE — TELEPHONE ENCOUNTER
[facility] for Mr. Rod Moore and sent [employer] message. Transmission he sent was received and everything looks good. Not sure if the STIM returned or not? Will wait for return call/message.

## 2022-04-18 NOTE — TELEPHONE ENCOUNTER
Pt has concerns about his device that \"is extremely uncomfortable. \" Please advise.      [phone]

## 2022-04-19 ENCOUNTER — CLINICAL SUPPORT (OUTPATIENT)
Dept: CARDIOLOGY CLINIC | Age: 57
End: 2022-04-19

## 2022-04-19 DIAGNOSIS — Z95.810 BIVENTRICULAR ICD (IMPLANTABLE CARDIOVERTER-DEFIBRILLATOR) IN PLACE: Primary | ICD-10-CM

## 2022-04-27 ENCOUNTER — TELEPHONE (OUTPATIENT)
Dept: CARDIOLOGY CLINIC | Age: 57
End: 2022-04-27

## 2022-04-27 NOTE — TELEPHONE ENCOUNTER
Pt calling about appt for tomorrow  discussed in [employer] message, please advise.      [phone]

## 2022-04-28 NOTE — TELEPHONE ENCOUNTER
Returned pt's call today. He never received MyChart message. He was not aware of today's appointment. Rescheduled for late Tuesday, 5/3/22 afternoon per his request.    He expressed appreciation of call and was sorry he didn't know about today's appointment - he check ed mychart & did not see the message while I was on the phone with him. Lala Andres, [facility] rep, here & aware of change in appointment - rep will be here.

## 2022-05-03 ENCOUNTER — CLINICAL SUPPORT (OUTPATIENT)
Dept: CARDIOLOGY CLINIC | Age: 57
End: 2022-05-03

## 2022-05-03 DIAGNOSIS — Z95.810 BIVENTRICULAR ICD (IMPLANTABLE CARDIOVERTER-DEFIBRILLATOR) IN PLACE: Primary | ICD-10-CM

## 2022-06-20 DIAGNOSIS — E78.00 HYPERCHOLESTEROLEMIA WITHOUT HYPERTRIGLYCERIDEMIA: ICD-10-CM

## 2022-06-20 RX ORDER — ATORVASTATIN CALCIUM 40 MG/1
TABLET, FILM COATED ORAL
Qty: 90 TABLET | Refills: 0 | Status: SHIPPED | OUTPATIENT
Start: 2022-06-20 | End: 2022-09-14

## 2022-08-11 ENCOUNTER — OFFICE VISIT (OUTPATIENT)
Dept: CARDIOLOGY CLINIC | Age: 57
End: 2022-08-11

## 2022-08-11 ENCOUNTER — CLINICAL SUPPORT (OUTPATIENT)
Dept: CARDIOLOGY CLINIC | Age: 57
End: 2022-08-11
Payer: COMMERCIAL

## 2022-08-11 VITALS
BODY MASS INDEX: 30.66 KG/M2 | WEIGHT: 219 LBS | DIASTOLIC BLOOD PRESSURE: 80 MMHG | OXYGEN SATURATION: 97 % | SYSTOLIC BLOOD PRESSURE: 122 MMHG | HEART RATE: 65 BPM | RESPIRATION RATE: 16 BRPM | HEIGHT: 71 IN

## 2022-08-11 DIAGNOSIS — Z95.810 BIVENTRICULAR ICD (IMPLANTABLE CARDIOVERTER-DEFIBRILLATOR) IN PLACE: Primary | ICD-10-CM

## 2022-08-11 DIAGNOSIS — I50.22 SYSTOLIC CHF, CHRONIC (HCC): ICD-10-CM

## 2022-08-11 DIAGNOSIS — I42.8 NICM (NONISCHEMIC CARDIOMYOPATHY) (HCC): ICD-10-CM

## 2022-08-11 DIAGNOSIS — I48.0 PAROXYSMAL ATRIAL FIBRILLATION (HCC): ICD-10-CM

## 2022-08-11 DIAGNOSIS — Q20.5 CORRECTED TRANSPOSITION OF GREAT VESSELS: ICD-10-CM

## 2022-08-11 DIAGNOSIS — Z95.810 PRESENCE OF BIVENTRICULAR AUTOMATIC CARDIOVERTER/DEFIBRILLATOR (AICD): Primary | ICD-10-CM

## 2022-08-11 DIAGNOSIS — I47.29 PAROXYSMAL VT: ICD-10-CM

## 2022-08-11 DIAGNOSIS — Z86.79 H/O COMPLETE ATRIOVENTRICULAR BLOCK: ICD-10-CM

## 2022-08-11 PROCEDURE — 93000 ELECTROCARDIOGRAM COMPLETE: CPT | Performed by: INTERNAL MEDICINE

## 2022-08-11 PROCEDURE — 99214 OFFICE O/P EST MOD 30 MIN: CPT | Performed by: INTERNAL MEDICINE

## 2022-08-11 PROCEDURE — 93284 PRGRMG EVAL IMPLANTABLE DFB: CPT | Performed by: INTERNAL MEDICINE

## 2022-08-11 NOTE — PATIENT INSTRUCTIONS
Your LV Lead Reposition procedure has been scheduled for 12/7/22 at 1:00 pm, at [facility].    Please report to Admitting Department by 11:00 am, or 2 hours prior to your scheduled procedure. Please bring a list of your current medications and medication bottles, if able, to the hospital on this day. You will be unable to drive after your procedure so please make sure to bring someone with you to your procedure. You will need to have nothing to eat or drink after midnight, the night prior to your procedure. You may have small sips of water, if needed, to take with your medication. You will also need to see Dr. Ruben Wood Nurse Practitioner, Conor Desouza, in office prior to your procedure. An appointment has been scheduled for 11/21/22 at 9:00 am.    You will need labs drawn prior to your procedure. Please plan to have these drawn at your appointment with Mynor Grimaldo NP. You should not stop your medication prior to your scheduled procedure. After your procedure, you will need to follow up with Dr. Ruben Wood nurse for a wound and device check. Your follow-up appointment has been scheduled for 12/16/22 at 9:00 am.     Hibiclens 4% topical solution   Patient it start Hibiclens application 5 days prior to procedure date    Directions Hibiclens 4%: Start cleanse 5 days prior to procedure   1. Rinse area (upper chest and upper arms) with water. 2. Apply minimum amount necessary to scrub the upper chest area from shoulder/neck to mid line of chest and to below the nipple each of  5 nights before the day of the procedure  3. Let solution dry.

## 2022-08-11 NOTE — PROGRESS NOTES
Chargeable BIV ICD in-clinic check. (Q1 Labs)      Normal device function. RA noise alert. 39% AP. 99% RVP. 99% LVP. See scanned report in  for details.

## 2022-08-23 DIAGNOSIS — I48.0 PAROXYSMAL ATRIAL FIBRILLATION (HCC): ICD-10-CM

## 2022-08-23 RX ORDER — METOPROLOL SUCCINATE 50 MG/1
50 TABLET, EXTENDED RELEASE ORAL DAILY
Qty: 90 TABLET | Refills: 1 | Status: SHIPPED | OUTPATIENT
Start: 2022-08-23

## 2022-08-23 RX ORDER — FLECAINIDE ACETATE 100 MG/1
100 TABLET ORAL 2 TIMES DAILY
Qty: 180 TABLET | Refills: 1 | Status: SHIPPED | OUTPATIENT
Start: 2022-08-23

## 2022-08-23 NOTE — TELEPHONE ENCOUNTER
Requested Prescriptions     Signed Prescriptions Disp Refills    flecainide (TAMBOCOR) 100 mg tablet 180 Tablet 1     Sig: Take 1 Tablet by mouth two (2) times a day. Authorizing Provider: Lisset Schuler     Ordering User: Lee CRYSTAL    metoprolol succinate (TOPROL-XL) 50 mg XL tablet 90 Tablet 1     Sig: Take 1 Tablet by mouth daily. TAKE 1 TABLET BY MOUTH ONCE DAILY     Authorizing Provider: Lisset Schuler     Ordering User: Melinda Greene     Refills per verbal order from EMIL Jain NP.    Last visit: 8/11/22  Next visit: 11/21/22

## 2022-09-01 ENCOUNTER — OFFICE VISIT (OUTPATIENT)
Dept: INTERNAL MEDICINE CLINIC | Age: 57
End: 2022-09-01
Payer: COMMERCIAL

## 2022-09-01 VITALS
HEART RATE: 63 BPM | DIASTOLIC BLOOD PRESSURE: 76 MMHG | SYSTOLIC BLOOD PRESSURE: 118 MMHG | BODY MASS INDEX: 31.43 KG/M2 | HEIGHT: 71 IN | WEIGHT: 224.5 LBS | OXYGEN SATURATION: 96 % | TEMPERATURE: 98.4 F | RESPIRATION RATE: 12 BRPM

## 2022-09-01 DIAGNOSIS — M65.331 TRIGGER FINGER OF ALL DIGITS OF BOTH HANDS: ICD-10-CM

## 2022-09-01 DIAGNOSIS — M65.341 TRIGGER FINGER OF ALL DIGITS OF BOTH HANDS: ICD-10-CM

## 2022-09-01 DIAGNOSIS — Z12.5 PROSTATE CANCER SCREENING: ICD-10-CM

## 2022-09-01 DIAGNOSIS — I42.0 DILATED CARDIOMYOPATHY (HCC): Primary | ICD-10-CM

## 2022-09-01 DIAGNOSIS — M65.342 TRIGGER FINGER OF ALL DIGITS OF BOTH HANDS: ICD-10-CM

## 2022-09-01 DIAGNOSIS — M65.311 TRIGGER FINGER OF ALL DIGITS OF BOTH HANDS: ICD-10-CM

## 2022-09-01 DIAGNOSIS — M65.312 TRIGGER FINGER OF ALL DIGITS OF BOTH HANDS: ICD-10-CM

## 2022-09-01 DIAGNOSIS — M65.352 TRIGGER FINGER OF ALL DIGITS OF BOTH HANDS: ICD-10-CM

## 2022-09-01 DIAGNOSIS — M65.322 TRIGGER FINGER OF ALL DIGITS OF BOTH HANDS: ICD-10-CM

## 2022-09-01 DIAGNOSIS — R93.1 LVEF <40%: ICD-10-CM

## 2022-09-01 DIAGNOSIS — E78.00 HYPERCHOLESTEROLEMIA WITHOUT HYPERTRIGLYCERIDEMIA: ICD-10-CM

## 2022-09-01 DIAGNOSIS — M65.351 TRIGGER FINGER OF ALL DIGITS OF BOTH HANDS: ICD-10-CM

## 2022-09-01 DIAGNOSIS — M65.332 TRIGGER FINGER OF ALL DIGITS OF BOTH HANDS: ICD-10-CM

## 2022-09-01 DIAGNOSIS — Z95.810 BIVENTRICULAR ICD (IMPLANTABLE CARDIOVERTER-DEFIBRILLATOR) IN PLACE: ICD-10-CM

## 2022-09-01 DIAGNOSIS — I48.0 PAROXYSMAL ATRIAL FIBRILLATION (HCC): ICD-10-CM

## 2022-09-01 DIAGNOSIS — M65.321 TRIGGER FINGER OF ALL DIGITS OF BOTH HANDS: ICD-10-CM

## 2022-09-01 DIAGNOSIS — J45.40 MODERATE PERSISTENT ASTHMA WITHOUT COMPLICATION: ICD-10-CM

## 2022-09-01 PROCEDURE — 99214 OFFICE O/P EST MOD 30 MIN: CPT | Performed by: PHYSICIAN ASSISTANT

## 2022-09-01 RX ORDER — DICLOFENAC SODIUM 10 MG/G
GEL TOPICAL 4 TIMES DAILY
Qty: 100 G | Refills: 2 | Status: SHIPPED | OUTPATIENT
Start: 2022-09-01

## 2022-09-01 NOTE — PROGRESS NOTES
Oma Mccormack is a 62y.o. year old male seen in clinic today for   Chief Complaint   Patient presents with    Hypertension    Cholesterol Problem    Joint Pain       he is here today to follow up for ICD sp Pvtach and A-fib, Asthma, hld, htn    Hypertension: Controlled with enalopril 5 mg  Compliant with medications? yes  Compliant with diet? Yes  Compliant with exercise? Active moving things at work  Average blood pressure readings outside of office. 120/75    Denies any HA, dizziness, CP, SOB, edema, visual changes    Hx of A-fib. Has ICD in place. Giving him problems, incidentally shocking R side of heart. Has planned replacement in December but they are working to try and correct it via computer. He is overall feeling okay. He has no signs of decompensation. Using flecainide twice a day and metoprolol 50 mg. Labs due. Last PSA 1.6. No symptoms. Just had second pneumonia shot. Has had both shingles vaccines. Reports problem in hands getting worse. He is waking with \"claw hands\" and has to work his hands out. He has been noticing he cannot get a full  with either hand due to suspected arthritis. 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 he specifically denies any CP, SOB, HA. Dizziness, fevers, chills, N/V/D, urinary symptoms or other bowel changes. Current Outpatient Medications on File Prior to Visit   Medication Sig Dispense Refill    flecainide (TAMBOCOR) 100 mg tablet Take 1 Tablet by mouth two (2) times a day. 180 Tablet 1    metoprolol succinate (TOPROL-XL) 50 mg XL tablet Take 1 Tablet by mouth daily. TAKE 1 TABLET BY MOUTH ONCE DAILY 90 Tablet 1    atorvastatin (LIPITOR) 40 mg tablet Take 1 tablet by mouth once daily 90 Tablet 0    mometasone-formoterol (Dulera) 200-5 mcg/actuation HFA inhaler Take 2 Puffs by inhalation two (2) times a day.  (Patient taking differently: Take 2 Puffs by inhalation as needed.) 1 Each 5    enalapril (VASOTEC) 5 mg tablet Take 1 tablet by mouth twice daily 180 Tablet 1    multivitamin (ONE A DAY) tablet Take 1 Tab by mouth daily. Cetirizine 10 mg cap Take  by mouth daily. albuterol (VENTOLIN HFA) 90 mcg/actuation inhaler INHALE TWO PUFFS BY MOUTH EVERY 4 HOURS AS NEEDED FOR  WHEEZING 1 Inhaler 2    [DISCONTINUED] predniSONE (DELTASONE) 20 mg tablet Take 2 tabs daily for 5 days. 10 Tablet 0     No current facility-administered medications on file prior to visit. 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 # [id number]. Pacemaker       Past Surgical History:   Procedure Laterality Date    CARDIAC CATHETERIZATION  9/13/2011    Hemodynamics - RA 5, RV 43/12 LVEDP12, RV sat 81% FA 98%, systemic ventricular function diminished, normal coronaries    COLONOSCOPY N/A 4/21/2017    COLONOSCOPY performed by Ashleigh Lezama MD at [address]  4/21/2017         HX APPENDECTOMY      HX HEART CATHETERIZATION      HX PACEMAKER  6/2008     [facility] [employer] # [id number].     HX PACEMAKER  03/2022        Family History   Problem Relation Age of Onset    High Cholesterol Mother     COPD Mother     Diabetes Father     COPD Father     No Known Problems Sister     Heart Disease Other         Social History     Socioeconomic History    Marital status:      Spouse name: Not on file    Number of children: Not on file    Years of education: Not on file    Highest education level: Not on file   Occupational History    Not on file   Tobacco Use    Smoking status: Never    Smokeless tobacco: Former   Vaping Use    Vaping Use: Never used   Substance and Sexual Activity    Alcohol use: Not Currently     Alcohol/week: 30.0 standard drinks     Types: 12 Cans of beer, 18 Standard drinks or equivalent per week     Comment: 1-12 beers per week    Drug use: No    Sexual activity: Yes     Partners: Female   Other Topics Concern    Not on file   Social History Narrative    Not on file     Social Determinants of Health     Financial Resource Strain: Not on file   Food Insecurity: Not on file   Transportation Needs: Not on file   Physical Activity: Not on file   Stress: Not on file   Social Connections: Not on file   Intimate Partner Violence: Not on file   Housing Stability: Not on file           Visit Vitals  /76 (BP 1 Location: Left upper arm, BP Patient Position: Sitting)   Pulse 63   Temp 98.4 °F (36.9 °C) (Oral)   Resp 12   Ht 5' 11\" (1.803 m)   Wt 224 lb 8 oz (101.8 kg)   SpO2 96%   BMI 31.31 kg/m²       Review of Systems   Constitutional:  Negative for chills, fever, malaise/fatigue and weight loss. Respiratory:  Negative for cough, shortness of breath and wheezing. Cardiovascular:  Negative for chest pain, palpitations and leg swelling. Gastrointestinal:  Negative for abdominal pain, blood in stool, constipation, diarrhea, heartburn, melena, nausea and vomiting. Genitourinary:  Negative for dysuria and frequency. Musculoskeletal:  Positive for joint pain. Negative for myalgias. Skin:  Negative for rash. Neurological:  Negative for dizziness, weakness and headaches. Endo/Heme/Allergies:  Does not bruise/bleed easily. Psychiatric/Behavioral:  Negative for depression. All other systems reviewed and are negative. Physical Exam  Vitals and nursing note reviewed. Constitutional:       General: He is not in acute distress. Appearance: Normal appearance. He is obese.    HENT:      Head: Normocephalic and atraumatic. Right Ear: Tympanic membrane, ear canal and external ear normal.      Left Ear: Tympanic membrane, ear canal and external ear normal.      Nose: Nose normal.      Mouth/Throat:      Mouth: Mucous membranes are moist.      Pharynx: Oropharynx is clear. No oropharyngeal exudate or posterior oropharyngeal erythema. Eyes:      Conjunctiva/sclera: Conjunctivae normal.      Pupils: Pupils are equal, round, and reactive to light. Neck:      Vascular: No carotid bruit. Cardiovascular:      Rate and Rhythm: Normal rate and regular rhythm. Pulses: Normal pulses. Heart sounds: No murmur heard. Comments: ICD L CW  Pulmonary:      Effort: Pulmonary effort is normal.      Breath sounds: Normal breath sounds. No stridor. No wheezing, rhonchi or rales. Abdominal:      General: Abdomen is flat. Bowel sounds are normal. There is no distension. Tenderness: There is no abdominal tenderness. There is no guarding. Musculoskeletal:         General: Normal range of motion. Cervical back: Normal range of motion and neck supple. No rigidity. Right lower leg: No edema. Left lower leg: No edema. Comments: No swelling to fingers BL or knuckle swelling    Lymphadenopathy:      Cervical: No cervical adenopathy. Skin:     General: Skin is dry. Capillary Refill: Capillary refill takes less than 2 seconds. Neurological:      General: No focal deficit present. Mental Status: He is alert and oriented to person, place, and time. Mental status is at baseline. Motor: Weakness (BL  weakness and decreased ROM to ) present. Psychiatric:         Mood and Affect: Mood normal.        No results found for this or any previous visit (from the past 24 hour(s)). ASSESSMENT AND PLAN  Diagnoses and all orders for this visit:    1. Dilated cardiomyopathy ([address]  ICD in place. Followed by Cardiology  2. Paroxysmal atrial fibrillation ([address]  ICD in place.  Continue Flecainide and metoprolol  3. Moderate persistent asthma without complication  Advised to begin [facility] in Fall and early spring to prevent flare ups  4. Biventricular ICD (implantable cardioverter-defibrillator) in place  Working with Cardiology for dysfunction  5. Hypercholesterolemia without hypertriglyceridemia  -     LIPID PANEL; Future  -     METABOLIC PANEL, COMPREHENSIVE; Future  -     CBC WITH AUTOMATED DIFF; Future  Check labs   6. LVEF <40%    7. Prostate cancer screening  -     PSA SCREENING (SCREENING); Future  Asymptomatic  8. Trigger finger of all digits of both hands  -     REFERRAL TO ORTHOPEDICS  Advised to try diclofenac gel, send to hand specialist          I have discussed the diagnosis with the patient and the intended plan as seen in the above orders. Patient is in agreement. The patient has received an after-visit summary and questions were answered concerning future plans. I have discussed medication side effects and warnings with the patient as well.     Kassandra Gustafson PA-C

## 2022-09-01 NOTE — PROGRESS NOTES
Aisha Antoine  Identified pt with two pt identifiers(name and ). Chief Complaint   Patient presents with    Hypertension    Cholesterol Problem    Joint Pain       Reviewed record In preparation for visit and have obtained necessary documentation. 1. Have you been to the ER, urgent care clinic or hospitalized since your last visit? No     2. Have you seen or consulted any other health care providers outside of the [address] [place] [address] since your last visit? Include any pap smears or colon screening. No    Vitals reviewed with provider.     Health Maintenance reviewed:     Health Maintenance Due   Topic    Pneumococcal 0-64 years (2 - PCV)    Shingrix Vaccine Age 50> (2 of 2)    Lipid Screen     COVID-19 Vaccine (4 - Booster for Moderna series)    Flu Vaccine (1)          Wt Readings from Last 3 Encounters:   22 224 lb 8 oz (101.8 kg)   22 219 lb (99.3 kg)   22 219 lb 8 oz (99.6 kg)        Temp Readings from Last 3 Encounters:   22 98.4 °F (36.9 °C) (Oral)   22 98.5 °F (36.9 °C)   21 97.8 °F (36.6 °C) (Oral)        BP Readings from Last 3 Encounters:   22 118/76   22 122/80   22 105/66        Pulse Readings from Last 3 Encounters:   22 63   22 65   22 74        Vitals:    22 1106   BP: 118/76   Pulse: 63   Resp: 12   Temp: 98.4 °F (36.9 °C)   TempSrc: Oral   SpO2: 96%   Weight: 224 lb 8 oz (101.8 kg)   Height: 5' 11\" (1.803 m)   PainSc:   0 - No pain          Learning Assessment:   :       Learning Assessment 2014   PRIMARY LEARNER Patient   PRIMARY LANGUAGE ENGLISH   LEARNER PREFERENCE PRIMARY READING     DEMONSTRATION     OTHER (COMMENT)   ANSWERED BY patient   RELATIONSHIP SELF        Depression Screening:   :       3 most recent PHQ Screens 2022   Little interest or pleasure in doing things Not at all   Feeling down, depressed, irritable, or hopeless Not at all   Total Score PHQ 2 0        Fall Risk Assessment:   :     No flowsheet data found. Abuse Screening:   :     No flowsheet data found.      ADL Screening:   :       ADL Assessment 4/5/2022   Feeding yourself No Help Needed   Getting from bed to chair No Help Needed   Getting dressed No Help Needed   Bathing or showering No Help Needed   Walk across the room (includes cane/walker) No Help Needed   Using the telphone No Help Needed   Taking your medications No Help Needed   Preparing meals No Help Needed   Managing money (expenses/bills) No Help Needed   Moderately strenuous housework (laundry) No Help Needed   Shopping for personal items (toiletries/medicines) No Help Needed   Shopping for groceries No Help Needed   Driving No Help Needed   Climbing a flight of stairs No Help Needed   Getting to places beyond walking distances No Help Needed

## 2022-09-13 DIAGNOSIS — E78.00 HYPERCHOLESTEROLEMIA WITHOUT HYPERTRIGLYCERIDEMIA: ICD-10-CM

## 2022-09-14 RX ORDER — ATORVASTATIN CALCIUM 40 MG/1
TABLET, FILM COATED ORAL
Qty: 6 TABLET | Refills: 0 | Status: SHIPPED | OUTPATIENT
Start: 2022-09-14 | End: 2022-09-23 | Stop reason: SDUPTHER

## 2022-09-23 DIAGNOSIS — E78.00 HYPERCHOLESTEROLEMIA WITHOUT HYPERTRIGLYCERIDEMIA: ICD-10-CM

## 2022-09-23 NOTE — TELEPHONE ENCOUNTER
PCP: Kumar Castellon PA-C     Last appt: 9/1/2022     Future Appointments   Date Time Provider Karan Georges   11/18/2022  8:00 AM REMOTE1, JJ MICHAEL AMB   11/21/2022  9:00 AM ECHO, JJ MICHAEL AMB   11/21/2022  9:40 AM PACEMAKER3, JJ MICHAEL AMB   11/21/2022 10:00 AM Buster Night B, JACEY MICHAEL AMB   12/16/2022  9:00 AM PACEMAKER3, JJ MICHAEL AMB   12/16/2022  9:30 AM WOUND CHECKS, JJ MICHAEL AMB   2/22/2023  8:15 AM REMOTE1, JJ MICHAEL AMB   9/11/2023  8:30 AM AMANDA Rodriguez AMB   9/21/2023 11:20 AM PACEMAKER3, JJ MICAHEL AMB   9/21/2023 11:40 AM MD ROSE MARY Marie BS AMB          Requested Prescriptions     Pending Prescriptions Disp Refills    atorvastatin (LIPITOR) 40 mg tablet 90 Tablet 0     Sig: Take 1 Tablet by mouth daily.

## 2022-09-24 RX ORDER — ATORVASTATIN CALCIUM 40 MG/1
40 TABLET, FILM COATED ORAL DAILY
Qty: 90 TABLET | Refills: 0 | Status: SHIPPED | OUTPATIENT
Start: 2022-09-24

## 2022-10-04 LAB
ALBUMIN SERPL-MCNC: 4.7 G/DL (ref 3.8–4.9)
ALBUMIN/GLOB SERPL: 2 {RATIO} (ref 1.2–2.2)
ALP SERPL-CCNC: 65 IU/L (ref 44–121)
ALT SERPL-CCNC: 26 IU/L (ref 0–44)
AST SERPL-CCNC: 21 IU/L (ref 0–40)
BASOPHILS # BLD AUTO: 0.1 X10E3/UL (ref 0–0.2)
BASOPHILS NFR BLD AUTO: 1 %
BILIRUB SERPL-MCNC: 0.9 MG/DL (ref 0–1.2)
BUN SERPL-MCNC: 13 MG/DL (ref 6–24)
BUN/CREAT SERPL: 11 (ref 9–20)
CALCIUM SERPL-MCNC: 9.8 MG/DL (ref 8.7–10.2)
CHLORIDE SERPL-SCNC: 101 MMOL/L (ref 96–106)
CHOLEST SERPL-MCNC: 204 MG/DL (ref 100–199)
CO2 SERPL-SCNC: 23 MMOL/L (ref 20–29)
CREAT SERPL-MCNC: 1.2 MG/DL (ref 0.76–1.27)
EGFR: 71 ML/MIN/1.73
EOSINOPHIL # BLD AUTO: 0.5 X10E3/UL (ref 0–0.4)
EOSINOPHIL NFR BLD AUTO: 7 %
ERYTHROCYTE [DISTWIDTH] IN BLOOD BY AUTOMATED COUNT: 12.5 % (ref 11.6–15.4)
GLOBULIN SER CALC-MCNC: 2.4 G/DL (ref 1.5–4.5)
GLUCOSE SERPL-MCNC: 106 MG/DL (ref 70–99)
HCT VFR BLD AUTO: 48.5 % (ref 37.5–51)
HDLC SERPL-MCNC: 58 MG/DL
HGB BLD-MCNC: 16.6 G/DL (ref 13–17.7)
IMM GRANULOCYTES # BLD AUTO: 0 X10E3/UL (ref 0–0.1)
IMM GRANULOCYTES NFR BLD AUTO: 0 %
LDLC SERPL CALC-MCNC: 124 MG/DL (ref 0–99)
LYMPHOCYTES # BLD AUTO: 1.7 X10E3/UL (ref 0.7–3.1)
LYMPHOCYTES NFR BLD AUTO: 23 %
MCH RBC QN AUTO: 31.6 PG (ref 26.6–33)
MCHC RBC AUTO-ENTMCNC: 34.2 G/DL (ref 31.5–35.7)
MCV RBC AUTO: 92 FL (ref 79–97)
MONOCYTES # BLD AUTO: 0.7 X10E3/UL (ref 0.1–0.9)
MONOCYTES NFR BLD AUTO: 9 %
NEUTROPHILS # BLD AUTO: 4.6 X10E3/UL (ref 1.4–7)
NEUTROPHILS NFR BLD AUTO: 60 %
PLATELET # BLD AUTO: 209 X10E3/UL (ref 150–450)
POTASSIUM SERPL-SCNC: 4.6 MMOL/L (ref 3.5–5.2)
PROT SERPL-MCNC: 7.1 G/DL (ref 6–8.5)
PSA SERPL-MCNC: 2.5 NG/ML (ref 0–4)
RBC # BLD AUTO: 5.26 X10E6/UL (ref 4.14–5.8)
SODIUM SERPL-SCNC: 140 MMOL/L (ref 134–144)
TRIGL SERPL-MCNC: 127 MG/DL (ref 0–149)
VLDLC SERPL CALC-MCNC: 22 MG/DL (ref 5–40)
WBC # BLD AUTO: 7.5 X10E3/UL (ref 3.4–10.8)

## 2022-10-04 NOTE — PROGRESS NOTES
Labs look okay,    Cholesterol up a tad. Ensure you are taking your Lipitor daily. Ratio still good 2:1 bad to good which is goal.   Blood sugar remains mildly elevated. PSA negative.

## 2022-10-19 NOTE — PROGRESS NOTES
-- DO NOT REPLY / DO NOT REPLY ALL --  -- Message is from Engagement Center [employer] (ECO) --    General Patient Message Patient stated she had a fall yesterday after not using her cane, declined to go the ER after paramedics came , stated she is using ice packs for her knees and wrists, and lidocaine cream. Please advise.      Caller Information       Type Contact Phone/Fax    10/19/2022 11:10 AM CDT Phone (Incoming) [place]Self[place] [phone] (M)        Alternative phone number: none    Can a detailed message be left? Yes    Message Turnaround:     Is it Working Hours? Yes - Working Hours     [place]:    Please give this turnaround time to the caller:   \"This message will be sent to [state Provider's name]. The clinical team will fulfill your request as soon as they review your message.\"                 Cardiac Electrophysiology Office Note     Subjective:       Christy Rivera is a 62 y.o. male patient who presents for follow up [employer] biventricular ICD (upgrade  03/18/2022, RA 05/31/1995, RV 10/06/2021). Sensitive to LV stim, has had multiple vectors tested by rep and device clinic staff. NICM. LVEF 35-40% in 12/2021. NYHA I-II chronic systolic CHF. On appropriate GDMT. States he overall feels better since upgrade, but does note slightly more SOB. Continues flecainide. ECG today shows atrial pacing, biventricular pacing. Paced complexes with QRSd 170 ms. He denies chest pain, palpitations, syncope, or edema. BP controlled. Previous: In 2015, switched from flecainide to amiodarone, but had visual & neuro side effects, switched back to flecainide. He declined Tikosyn. 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 Previously followed by Dr. Hallie Muscat Dr. Madelyn Cooks, has since transferred back to [facility] office. Abnormal nuclear stress 2011 with cardiac cath 9/2011 that showed normal coronary arteries. Works as a persaud.      Problem List  Date Reviewed: 8/13/2022            Codes Class Noted    Biventricular ICD (implantable cardioverter-defibrillator) in place ICD-10-CM: Z95.810  ICD-9-CM: V45.02  3/18/2022    Overview Signed 3/18/2022 12:03 PM by Wallace Barrera MD     3/18/2022 [employer] BIV ICD upgrade-new LV lead             Dilated cardiomyopathy ([facility] ICD-10-CM: I42.0  ICD-9-CM: 425.4  3/18/2022        LVEF <40% ICD-10-CM: R93.1  ICD-9-CM: 785.9  3/18/2022        Moderate persistent asthma without complication GGV-19-ZT: T25.14  ICD-9-CM: 493.90  1/4/2017        Paroxysmal VT ([facility] ICD-10-CM: I47.2  ICD-9-CM: 427.1  9/13/2011    Overview Signed 10/14/2011  1:07 PM by Cristiana Levine LPN 10/6/2011-AICD (upgrade)-[employer]. Paroxsymal atrial fibrillation ICD-10-CM: I48.91  ICD-9-CM: 427.31  9/30/2010        H/O complete atrioventricular block ICD-10-CM: Z86.79  ICD-9-CM: V12.59  Unknown        Sinoatrial node dysfunction (HCC) ICD-10-CM: I49.5  ICD-9-CM: 427.81  Unknown    Overview Addendum 10/14/2011  1:07 PM by Martita Monsalve LPN     64/6/5823-SXRA (upgrade)-[employer]. Hypercholesterolemia without hypertriglyceridemia ICD-10-CM: E78.00  ICD-9-CM: 272.0  8/25/2010        Hypertension, essential, benign ICD-10-CM: I10  ICD-9-CM: 401.1  8/25/2010        History of colonoscopy with polypectomy ICD-10-CM: Z98.890, Z86.010  ICD-9-CM: V45.89, V12.72  8/25/2010    Overview Addendum 5/28/2014  8:10 AM by Stefania Hu MD     1 polyp 5/06, repeat 2016             Corrected transposition of great vessels ICD-10-CM: Q20.5  ICD-9-CM: 745.12  8/25/2010    Overview Addendum 11/3/2011  3:01 PM by Samantha Ortiz. Corrected transposition of the great vessels, sinus node dysfunction, AV Block and pacemaker  Dr. Elvia Casillas  Echo unchanged 10/10  Echo 10/23/11 showed a decreses in EF from 50% to 35-40%                 Allergies   Allergen Reactions   · Cephalexin Hives     Current Outpatient Medications on File Prior to Visit   Medication Sig Dispense Refill    atorvastatin (LIPITOR) 40 mg tablet Take 1 tablet by mouth once daily 90 Tablet 0    mometasone-formoterol (Dulera) 200-5 mcg/actuation HFA inhaler Take 2 Puffs by inhalation two (2) times a day. 1 Each 5    enalapril (VASOTEC) 5 mg tablet Take 1 tablet by mouth twice daily 180 Tablet 1    flecainide (TAMBOCOR) 100 mg tablet Take 1 tablet by mouth twice daily 180 Tablet 1    metoprolol succinate (TOPROL-XL) 50 mg XL tablet Take 1 tablet by mouth once daily 90 Tablet 1    multivitamin (ONE A DAY) tablet Take 1 Tab by mouth daily. Cetirizine 10 mg cap Take  by mouth daily.       albuterol (VENTOLIN HFA) 90 mcg/actuation inhaler INHALE TWO PUFFS BY MOUTH EVERY 4 HOURS AS NEEDED FOR  WHEEZING 1 Inhaler 2    predniSONE (DELTASONE) 20 mg tablet Take 2 tabs daily for 5 days. 10 Tablet 0     No current facility-administered medications on file prior to visit. 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 Pacemaker      Past Surgical History:   Procedure Laterality Date    CARDIAC CATHETERIZATION  9/13/2011    Hemodynamics - RA 5, RV 43/12 LVEDP12, RV sat 81% FA 98%, systemic ventricular function diminished, normal coronaries    COLONOSCOPY N/A 4/21/2017    COLONOSCOPY performed by Alice Escalante MD at [address]  4/21/2017         HX APPENDECTOMY      HX HEART CATHETERIZATION      HX PACEMAKER  6/2008     [facility] [email or web address] # [id number]. HX PACEMAKER  03/2022       Family History   Problem Relation Age of Onset   · High Cholesterol Mother   · COPD Mother   · Diabetes Father   · COPD Father   · No Known Problems Sister   · Heart Disease Other     Social History     Tobacco Use   · Smoking status: Never   · Smokeless tobacco: Former   Substance Use Topics   · Alcohol use: Not Currently   Alcohol/week: 30.0 standard drinks   Types: 12 Cans of beer, 18 Standard drinks or equivalent per week   Comment: 1-12 beers per week         Review of Systems:   Constitutional: Negative for fever, chills, weight loss, malaise/fatigue. HEENT: Negative for nosebleeds, vision changes.    Respiratory: No cough, hemoptysis, sputum production, and  wheezing. Cardiovascular: Negative for chest pain, palpitations, orthopnea, claudication, leg swelling, syncope, and PND. + LV lead stim, + WHITE with significant exertion. Gastrointestinal: Negative for nausea, vomiting, diarrhea, constipation, blood in stool and melena. Genitourinary: Negative for dysuria, and hematuria. Musculoskeletal: Negative for myalgias, arthralgia. Skin: Negative for rash. Heme: Does not bleed or bruise easily. Neurological: Negative for speech change and focal weakness. Objective:   Visit Vitals  /80   Pulse 65   Resp 16   Ht 5' 11\" (1.803 m)   Wt 219 lb (99.3 kg)   SpO2 97%   BMI 30.54 kg/m²           Physical Exam:   Constitutional: Well-developed and well-nourished. No distress. Head: Normocephalic and atraumatic. Eyes: Pupils are equal, round. Neck: Supple. No JVD present. Cardiovascular: Normal rate, regular rhythm and 2/6 systolic murmur heard. Diaphragmatic stim noted. Pulmonary/Chest: Respirations unlabored. Expiratory wheeze right lung field. Abdominal: Soft, no tenderness. Musculoskeletal: Moves extremities independently. Normal gait. Vasc/lymphatic: No edema. Neurological: Alert, oriented. Skin: Skin is warm and dry. ICD incision with small keloid scar, unremarkable site, slightly prominent along lateral edge. No swelling. Psychiatric: Normal mood and affect. Behavior is normal. Judgment and thought content normal.       ECG: Atrial pacing, biventricular pacing. Paced complexes with QRSd 170 ms. Assessment/Plan:     Imaging/Studies:   Echo (12/09/2021): LVEF estimated 35-40%. 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 Mild MR. ICD-10-CM ICD-9-CM    1. Biventricular ICD (implantable cardioverter-defibrillator) in place  Z95.810 V45.02       2. Paroxysmal atrial fibrillation (HCC)  I48.0 427.31 AMB POC EKG ROUTINE W/ 12 LEADS, INTER & REP      3.  NICM (nonischemic cardiomyopathy) (HCC)  I42.8 425.4       4. H/O complete atrioventricular block  Z86.79 V12.59       5. Systolic CHF, chronic (HCC)  I50.22 428.22      428.0       6. Paroxysmal VT ([facility]  I47.2 427.1       7. Corrected transposition of great vessels  Q20.5 745.12           [employer] biventricular ICD (DOI 03/18/2022, RA 05/31/1995, RV 10/06/2021): Known complete AVB. Device check today shows proper lead & generator function. Generator longevity estimated 9 years. RA 39%, CRT 99%. AT/AF burden <1%. Still with intermittent LV stim despite testing multiple vectors previously. Pulse width lowered today, still with stim. Reviewed risks/benefits of LV lead reposition/reimplant   I had explained to him there were only one other branch I can try  He would like to proceed with scheduling. He said LV lead pacing help his WHITE significantly  Will again have rep come in at time of H&P to test vectors before taking the risk of reopening the pocket. NICM: LVEF 35-40% in 12/2021. NYHA I-II chronic systolic CHF. Continue GDMT as previously prescribed. Recheck 2D echo. PAF: [place] <1%. ECG today shows atrial pacing, biventricular pacing   Continue flecainide as previously prescribed if LVEF is improved  Reevaluate 2 D echo. This is the only AAD he has tolerated. 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 Remote ICD checks q 3 months. Follow up in EP clinic in 1 year.      Future Appointments   Date Time Provider Karan Georges   9/1/2022 11:00 AM AMANDA Hill BS AMB   11/18/2022  8:00 AM REMOTE1, JJ BAZZI BS AMB   11/21/2022  9:00 AM ECHO, JJ MICHAEL AMB   11/21/2022  9:40 AM PACEMAKER3, JJ MICHAEL AMB   11/21/2022 10:00 AM JACEY Cruz BS AMB   12/16/2022  9:00 AM PACEMAKER3, JJ BAZZI BS AMB   12/16/2022  9:30 AM WOUND CHECKS, JJ MICHAEL AMB   2/22/2023  8:15 AM REMOTE1, JJ BAZZI BS AMB   9/21/2023 11:20 AM PACEMAKER3, JJ MICHAEL AMB   9/21/2023 11:40 AM MD ROSE MARY Cool BS AMB         Thank you for involving me in this patient's care and please call with further concerns or questions. Carson Lanier M.D.    Electrophysiology/Cardiology   [facility] [place]   [address] [place] [address]                                [phone]

## 2022-11-18 ENCOUNTER — OFFICE VISIT (OUTPATIENT)
Dept: CARDIOLOGY CLINIC | Age: 57
End: 2022-11-18
Payer: COMMERCIAL

## 2022-11-18 DIAGNOSIS — Z95.810 PRESENCE OF BIVENTRICULAR AUTOMATIC CARDIOVERTER/DEFIBRILLATOR (AICD): Primary | ICD-10-CM

## 2022-11-18 PROCEDURE — 93295 DEV INTERROG REMOTE 1/2/MLT: CPT | Performed by: INTERNAL MEDICINE

## 2022-11-18 PROCEDURE — 93296 REM INTERROG EVL PM/IDS: CPT | Performed by: INTERNAL MEDICINE

## 2022-11-18 NOTE — H&P (VIEW-ONLY)
Cardiac Electrophysiology OFFICE Note     Subjective:      Homar Cesar is a 62 y.o. patient who presents for H&P update prior to upcoming planned LV lead revision of [employer] biventricular ICD (upgrade  03/18/2022, RA 05/31/1995, RV 10/06/2021), scheduled for 12/07/2022. Sensitive to LV stim, has had multiple vectors tested by rep and device clinic staff. NICM. LVEF 35-40% in 12/2021. NYHA I-II chronic systolic CHF. On appropriate GDMT. States he overall feels better since upgrade, but does note slightly more SOB. Continues flecainide. ECG on 08/13/2022 showed atrial pacing, biventricular pacing. Paced complexes with QRSd 170 ms. He denies chest pain, palpitations, syncope, or edema. BP controlled. Previous:   [employer] biventricular ICD (upgrade  03/18/2022, RA 05/31/1995, RV 10/06/2021). In 2015, switched from flecainide to amiodarone, but had visual & neuro side effects, switched back to flecainide. He declined Tikosyn. 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 Previously followed by Dr. Isra Strickland, has since transferred back to [facility] office. Abnormal nuclear stress 2011 with cardiac cath 9/2011 that showed normal coronary arteries. Works as a persaud.      Problem List  Date Reviewed: 9/1/2022            Codes Class Noted    Biventricular ICD (implantable cardioverter-defibrillator) in place ICD-10-CM: Z95.810  ICD-9-CM: V45.02  3/18/2022    Overview Signed 3/18/2022 12:03 PM by Neymar Griggs MD     3/18/2022 [employer] BIV ICD upgrade-new LV lead             Dilated cardiomyopathy ([facility] [address] ICD-10-CM: I42.0  ICD-9-CM: 425.4  3/18/2022        LVEF <40% ICD-10-CM: R93.1  ICD-9-CM: 785.9  3/18/2022        Moderate persistent asthma without complication BPA-45-JO-ANN: J45.40  ICD-9-CM: 493.90  1/4/2017        Paroxysmal VT ICD-10-CM: I47.29  ICD-9-CM: 427.1  9/13/2011    Overview Signed 10/14/2011  1:07 PM by Khadar Dueñas LPN     38/7/8361-ZAZS (upgrade)-[employer]-E 110. Paroxsymal atrial fibrillation ICD-10-CM: I48.91  ICD-9-CM: 427.31  9/30/2010        H/O complete atrioventricular block ICD-10-CM: Z86.79  ICD-9-CM: V12.59  Unknown        Sinoatrial node dysfunction (HCC) ICD-10-CM: I49.5  ICD-9-CM: 427.81  Unknown    Overview Addendum 10/14/2011  1:07 PM by Khadar Dueñas LPN     98/3/8372-PYQS (upgrade)-[employer]-E 110. Hypercholesterolemia without hypertriglyceridemia ICD-10-CM: E78.00  ICD-9-CM: 272.0  8/25/2010        Hypertension, essential, benign ICD-10-CM: I10  ICD-9-CM: 401.1  8/25/2010        History of colonoscopy with polypectomy ICD-10-CM: Z98.890, Z86.010  ICD-9-CM: V45.89, V12.72  8/25/2010    Overview Addendum 5/28/2014  8:10 AM by Clau Faulkner MD     1 polyp 5/06, repeat 2016             Corrected transposition of great vessels ICD-10-CM: Q20.5  ICD-9-CM: 745.12  8/25/2010    Overview Addendum 11/3/2011  3:01 PM by Natalie Echevarria. Corrected transposition of the great vessels, sinus node dysfunction, AV Block and pacemaker  Dr. Kevin Chen  Echo unchanged 10/10  Echo 10/23/11 showed a decreses in EF from 50% to 35-40%                  Current Outpatient Medications   Medication Sig Dispense Refill    enalapril (VASOTEC) 5 mg tablet Take 1 tablet by mouth twice daily 180 Tablet 1    atorvastatin (LIPITOR) 40 mg tablet Take 1 Tablet by mouth daily. 90 Tablet 0    diclofenac (VOLTAREN) 1 % gel Apply  to affected area four (4) times daily. 100 g 2    flecainide (TAMBOCOR) 100 mg tablet Take 1 Tablet by mouth two (2) times a day. 180 Tablet 1    metoprolol succinate (TOPROL-XL) 50 mg XL tablet Take 1 Tablet by mouth daily.  TAKE 1 TABLET BY MOUTH ONCE DAILY 90 Tablet 1    mometasone-formoterol (Dulera) 200-5 mcg/actuation HFA inhaler Take 2 Puffs by inhalation two (2) times a day. (Patient taking differently: Take 2 Puffs by inhalation as needed.) 1 Each 5    multivitamin (ONE A DAY) tablet Take 1 Tab by mouth daily. Cetirizine 10 mg cap Take  by mouth daily. 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 Pacemaker      Past Surgical History:   Procedure Laterality Date    CARDIAC CATHETERIZATION  9/13/2011    Hemodynamics - RA 5, RV 43/12 LVEDP12, RV sat 81% FA 98%, systemic ventricular function diminished, normal coronaries    COLONOSCOPY N/A 4/21/2017    COLONOSCOPY performed by Los Guallpa MD at [address]  4/21/2017         HX APPENDECTOMY      HX HEART CATHETERIZATION      HX PACEMAKER  6/2008     [facility] # [id number].     HX PACEMAKER  03/2022     Family History   Problem Relation Age of Onset    High Cholesterol Mother     COPD Mother     Diabetes Father     COPD Father     No Known Problems Sister     Heart Disease Other      Social History     Tobacco Use    Smoking status: Never    Smokeless tobacco: Former   Substance Use Topics    Alcohol use: Not Currently     Alcohol/week: 30.0 standard drinks     Types: 12 Cans of beer, 18 Standard drinks or equivalent per week     Comment: 1-12 beers per week        Review of Systems: Review of all other systems otherwise negative. Constitutional: Negative for fever, chills, weight loss, malaise/fatigue. HEENT: Negative for nosebleeds, vision changes. Respiratory: Negative for cough, hemoptysis. Cardiovascular: Negative for chest pain, palpitations, orthopnea, claudication, leg swelling, syncope, and PND. + LV lead stim, WHITE with significant exertion. Gastrointestinal: Negative for nausea, vomiting, diarrhea, blood in stool and melena. Genitourinary: Negative for dysuria, and hematuria. Musculoskeletal: Negative for myalgias, arthralgia. Skin: Negative for rash. Heme: Does not bleed or bruise easily. Neurological: Negative for speech change and focal weakness. Objective:     Visit Vitals  /80 (BP 1 Location: Left arm, BP Patient Position: Sitting, BP Cuff Size: Adult)   Pulse 70   Resp 15   Ht 5' 11\" (1.803 m)   Wt 223 lb (101.2 kg)   SpO2 98%   BMI 31.10 kg/m²        Physical Exam:   Constitutional: Well-developed and well-nourished. No respiratory distress. Head: Normocephalic and atraumatic. Eyes: Pupils are equal, round. ENT: Hearing grossly normal.  Neck: Supple. No JVD present. Cardiovascular: Normal rate, regular rhythm. Exam reveals no gallop and no friction rub. 2/6 systolic murmur heard. Pulmonary/Chest: Effort normal and breath sounds normal. No wheezes. Abdominal: Soft, no tenderness. Musculoskeletal: Moves extremities independently. Normal gait. Vasc/lymphatic: No edema. Neurological: Alert, oriented. Skin: Skin is warm and dry. ICD incision with small keloid scar, unremarkable site, slightly prominent along lateral edge. No swelling. Psychiatric: Normal mood and affect. Behavior is normal. Judgment and thought content normal.        Assessment/Plan:     Imaging/Studies:  Echo (12/09/2021): LVEF estimated 35-40%. 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 Mild MR.          ICD-10-CM ICD-9-CM    1. Biventricular ICD (implantable cardioverter-defibrillator) in place  Z95.810 V45.02       2. NICM (nonischemic cardiomyopathy) ([facility])  I42.8 425.4 CBC WITH AUTOMATED DIFF      METABOLIC PANEL, BASIC      3. PAF (paroxysmal atrial fibrillation) ([facility])  I48.0 427.31 CBC WITH AUTOMATED DIFF      METABOLIC PANEL, BASIC      4. Corrected transposition of great vessels  Q20.5 745.12       5. Systolic CHF, chronic ([facility])  I50.22 428.22      428.0       6. Complete AV block ([facility])  I44.2 426.0       7. PVC (premature ventricular contraction)  I49.3 427.69           [employer] biventricular ICD (DOI 03/18/2022, RA 05/31/1995, RV 10/06/2021): Known complete AVB. Device check on 08/11/2022 showed proper lead & generator function. Generator longevity estimated 9 years. RA 39%, CRT 99%. AT/AF burden <1%. Still with intermittent LV stim despite testing multiple vectors previously. Pulse width lowered previously, still with stim. Reviewed risks/benefits of LV lead reposition/reimplant; there is only 1 other branch to try. He agrees to proceed with LV lead revision. Vectors tested again today; still has stim. Labs ordered. Hibiclens reviewed. NICM: LVEF 35-40% in 12/2021; repeat echo pending today. NYHA I-II chronic systolic CHF. SOB improved with CRT. Continue GDMT as previously prescribed. PAF: [place] <1%. ECG in 08/2022 showed atrial pacing, biventricular pacing. Continue flecainide as previously prescribed if LVEF is improved; this is the only AAD that he has been able to tolerate. Preliminary report of echo today shows LVEF 30-35%; will have Dr. Delaney Bowles review, but anticipate he will need to discontinue flecainide. PVCs: States these were more bothersome than PAF for him from symptom standpoint. See above; he strongly prefers to stay on flecainide. 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       Future Appointments   Date Time Provider Karan Georges   12/16/2022  9:00 AM PACEMAKER3, JJ MICHAEL AMB   12/16/2022  9:30 AM WOUND CHECKS, JJ MICHAEL AMB   2/22/2023  8:15 AM REMOTE1, JJ MICHAEL AMB   9/11/2023  8:30 AM AMANDA Huffman AMB   9/21/2023 11:20 AM PACEMAKER3, JJ MICHAEL AMB   9/21/2023 11:40 AM MD ROSE MARY Bryant BS AMB     Thank you for involving me in this patient's care and please call with further concerns or questions.     ABHI Palacio  Vascular [place]  11/21/22

## 2022-11-18 NOTE — PROGRESS NOTES
Scheduled BIV ICD remote transmission. Device functioning appropriately as programmed. See scanned docments.  Chargeable visit

## 2022-11-18 NOTE — PROGRESS NOTES
Cardiac Electrophysiology OFFICE Note     Subjective:      Doreen Munoz is a 62 y.o. patient who presents for H&P update prior to upcoming planned LV lead revision of [employer] biventricular ICD (upgrade  03/18/2022, RA 05/31/1995, RV 10/06/2021), scheduled for 12/07/2022. Sensitive to LV stim, has had multiple vectors tested by rep and device clinic staff. NICM. LVEF 35-40% in 12/2021. NYHA I-II chronic systolic CHF. On appropriate GDMT. States he overall feels better since upgrade, but does note slightly more SOB. Continues flecainide. ECG on 08/13/2022 showed atrial pacing, biventricular pacing. Paced complexes with QRSd 170 ms. He denies chest pain, palpitations, syncope, or edema. BP controlled. Previous:   [employer] biventricular ICD (upgrade  03/18/2022, RA 05/31/1995, RV 10/06/2021). In 2015, switched from flecainide to amiodarone, but had visual & neuro side effects, switched back to flecainide. He declined Tikosyn. 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 Previously followed by Dr. Jian Goodwin, has since transferred back to [facility] office. Abnormal nuclear stress 2011 with cardiac cath 9/2011 that showed normal coronary arteries. Works as a persaud.      Problem List  Date Reviewed: 9/1/2022            Codes Class Noted    Biventricular ICD (implantable cardioverter-defibrillator) in place ICD-10-CM: Z95.810  ICD-9-CM: V45.02  3/18/2022    Overview Signed 3/18/2022 12:03 PM by Yesenia Pierson MD     3/18/2022 [employer] BIV ICD upgrade-new LV lead             Dilated cardiomyopathy ([facility] [address] ICD-10-CM: I42.0  ICD-9-CM: 425.4  3/18/2022        LVEF <40% ICD-10-CM: R93.1  ICD-9-CM: 785.9  3/18/2022        Moderate persistent asthma without complication RKM-13-AP: J45.40  ICD-9-CM: 493.90  1/4/2017        Paroxysmal VT ICD-10-CM: I47.29  ICD-9-CM: 427.1  9/13/2011    Overview Signed 10/14/2011  1:07 PM by Delores Wilkerson LPN     64/2/5237-PFCI (upgrade)-[employer]-E 110. Paroxsymal atrial fibrillation ICD-10-CM: I48.91  ICD-9-CM: 427.31  9/30/2010        H/O complete atrioventricular block ICD-10-CM: Z86.79  ICD-9-CM: V12.59  Unknown        Sinoatrial node dysfunction (HCC) ICD-10-CM: I49.5  ICD-9-CM: 427.81  Unknown    Overview Addendum 10/14/2011  1:07 PM by Delores Wlikerson LPN     42/6/6226-WEPQ (upgrade)-[employer]-E 110. Hypercholesterolemia without hypertriglyceridemia ICD-10-CM: E78.00  ICD-9-CM: 272.0  8/25/2010        Hypertension, essential, benign ICD-10-CM: I10  ICD-9-CM: 401.1  8/25/2010        History of colonoscopy with polypectomy ICD-10-CM: Z98.890, Z86.010  ICD-9-CM: V45.89, V12.72  8/25/2010    Overview Addendum 5/28/2014  8:10 AM by Adilia Resendiz MD     1 polyp 5/06, repeat 2016             Corrected transposition of great vessels ICD-10-CM: Q20.5  ICD-9-CM: 745.12  8/25/2010    Overview Addendum 11/3/2011  3:01 PM by Sophie Jordan. Corrected transposition of the great vessels, sinus node dysfunction, AV Block and pacemaker  Dr. Royce Espinoza  Echo unchanged 10/10  Echo 10/23/11 showed a decreses in EF from 50% to 35-40%                  Current Outpatient Medications   Medication Sig Dispense Refill    enalapril (VASOTEC) 5 mg tablet Take 1 tablet by mouth twice daily 180 Tablet 1    atorvastatin (LIPITOR) 40 mg tablet Take 1 Tablet by mouth daily. 90 Tablet 0    diclofenac (VOLTAREN) 1 % gel Apply  to affected area four (4) times daily. 100 g 2    flecainide (TAMBOCOR) 100 mg tablet Take 1 Tablet by mouth two (2) times a day. 180 Tablet 1    metoprolol succinate (TOPROL-XL) 50 mg XL tablet Take 1 Tablet by mouth daily.  TAKE 1 TABLET BY MOUTH ONCE DAILY 90 Tablet 1    mometasone-formoterol (Dulera) 200-5 mcg/actuation HFA inhaler Take 2 Puffs by inhalation two (2) times a day. (Patient taking differently: Take 2 Puffs by inhalation as needed.) 1 Each 5    multivitamin (ONE A DAY) tablet Take 1 Tab by mouth daily. Cetirizine 10 mg cap Take  by mouth daily. 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 Pacemaker      Past Surgical History:   Procedure Laterality Date    CARDIAC CATHETERIZATION  9/13/2011    Hemodynamics - RA 5, RV 43/12 LVEDP12, RV sat 81% FA 98%, systemic ventricular function diminished, normal coronaries    COLONOSCOPY N/A 4/21/2017    COLONOSCOPY performed by David Dorantes MD at [address]  4/21/2017         HX APPENDECTOMY      HX HEART CATHETERIZATION      HX PACEMAKER  6/2008     [facility] [employer] # [id number].     HX PACEMAKER  03/2022     Family History   Problem Relation Age of Onset    High Cholesterol Mother     COPD Mother     Diabetes Father     COPD Father     No Known Problems Sister     Heart Disease Other      Social History     Tobacco Use    Smoking status: Never    Smokeless tobacco: Former   Substance Use Topics    Alcohol use: Not Currently     Alcohol/week: 30.0 standard drinks     Types: 12 Cans of beer, 18 Standard drinks or equivalent per week     Comment: 1-12 beers per week        Review of Systems: Review of all other systems otherwise negative. Constitutional: Negative for fever, chills, weight loss, malaise/fatigue. HEENT: Negative for nosebleeds, vision changes. Respiratory: Negative for cough, hemoptysis. Cardiovascular: Negative for chest pain, palpitations, orthopnea, claudication, leg swelling, syncope, and PND. + LV lead stim, WHITE with significant exertion. Gastrointestinal: Negative for nausea, vomiting, diarrhea, blood in stool and melena. Genitourinary: Negative for dysuria, and hematuria. Musculoskeletal: Negative for myalgias, arthralgia. Skin: Negative for rash. Heme: Does not bleed or bruise easily. Neurological: Negative for speech change and focal weakness. Objective:     Visit Vitals  /80 (BP 1 Location: Left arm, BP Patient Position: Sitting, BP Cuff Size: Adult)   Pulse 70   Resp 15   Ht 5' 11\" (1.803 m)   Wt 223 lb (101.2 kg)   SpO2 98%   BMI 31.10 kg/m²        Physical Exam:   Constitutional: Well-developed and well-nourished. No respiratory distress. Head: Normocephalic and atraumatic. Eyes: Pupils are equal, round. ENT: Hearing grossly normal.  Neck: Supple. No JVD present. Cardiovascular: Normal rate, regular rhythm. Exam reveals no gallop and no friction rub. 2/6 systolic murmur heard. Pulmonary/Chest: Effort normal and breath sounds normal. No wheezes. Abdominal: Soft, no tenderness. Musculoskeletal: Moves extremities independently. Normal gait. Vasc/lymphatic: No edema. Neurological: Alert, oriented. Skin: Skin is warm and dry. ICD incision with small keloid scar, unremarkable site, slightly prominent along lateral edge. No swelling. Psychiatric: Normal mood and affect. Behavior is normal. Judgment and thought content normal.        Assessment/Plan:     Imaging/Studies:  Echo (12/09/2021): LVEF estimated 35-40%. 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 Mild MR.          ICD-10-CM ICD-9-CM    1. Biventricular ICD (implantable cardioverter-defibrillator) in place  Z95.810 V45.02       2. NICM (nonischemic cardiomyopathy) ([facility])  I42.8 425.4 CBC WITH AUTOMATED DIFF      METABOLIC PANEL, BASIC      3. PAF (paroxysmal atrial fibrillation) ([facility])  I48.0 427.31 CBC WITH AUTOMATED DIFF      METABOLIC PANEL, BASIC      4. Corrected transposition of great vessels  Q20.5 745.12       5. Systolic CHF, chronic ([facility])  I50.22 428.22      428.0       6. Complete AV block ([facility])  I44.2 426.0       7. PVC (premature ventricular contraction)  I49.3 427.69           [employer] biventricular ICD (DOI 03/18/2022, RA 05/31/1995, RV 10/06/2021): Known complete AVB. Device check on 08/11/2022 showed proper lead & generator function. Generator longevity estimated 9 years. RA 39%, CRT 99%. AT/AF burden <1%. Still with intermittent LV stim despite testing multiple vectors previously. Pulse width lowered previously, still with stim. Reviewed risks/benefits of LV lead reposition/reimplant; there is only 1 other branch to try. He agrees to proceed with LV lead revision. Vectors tested again today; still has stim. Labs ordered. Hibiclens reviewed. NICM: LVEF 35-40% in 12/2021; repeat echo pending today. NYHA I-II chronic systolic CHF. SOB improved with CRT. Continue GDMT as previously prescribed. PAF: [place] <1%. ECG in 08/2022 showed atrial pacing, biventricular pacing. Continue flecainide as previously prescribed if LVEF is improved; this is the only AAD that he has been able to tolerate. Preliminary report of echo today shows LVEF 30-35%; will have Dr. Isabel Alegre review, but anticipate he will need to discontinue flecainide. PVCs: States these were more bothersome than PAF for him from symptom standpoint. See above; he strongly prefers to stay on flecainide. 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       Future Appointments   Date Time Provider Karan Georges   12/16/2022  9:00 AM PACEMAKER3, JJ MICHAEL AMB   12/16/2022  9:30 AM WOUND CHECKS, JJ MICHAEL AMB   2/22/2023  8:15 AM REMOTE1, JJ MICHAEL AMB   9/11/2023  8:30 AM AMANDA Estrada AMB   9/21/2023 11:20 AM PACEMAKER3, JJ MICHAEL AMB   9/21/2023 11:40 AM MD ROSE MARY Wang  AMB     Thank you for involving me in this patient's care and please call with further concerns or questions.     LINDEN Bryan-BOB DiehlSyed  Vascular [place]  11/21/22

## 2022-11-21 ENCOUNTER — ANCILLARY PROCEDURE (OUTPATIENT)
Dept: CARDIOLOGY CLINIC | Age: 57
End: 2022-11-21

## 2022-11-21 ENCOUNTER — OFFICE VISIT (OUTPATIENT)
Dept: CARDIOLOGY CLINIC | Age: 57
End: 2022-11-21

## 2022-11-21 ENCOUNTER — CLINICAL SUPPORT (OUTPATIENT)
Dept: CARDIOLOGY CLINIC | Age: 57
End: 2022-11-21

## 2022-11-21 VITALS
OXYGEN SATURATION: 98 % | BODY MASS INDEX: 31.22 KG/M2 | WEIGHT: 223 LBS | HEIGHT: 71 IN | HEART RATE: 70 BPM | SYSTOLIC BLOOD PRESSURE: 130 MMHG | DIASTOLIC BLOOD PRESSURE: 80 MMHG | RESPIRATION RATE: 15 BRPM

## 2022-11-21 VITALS
DIASTOLIC BLOOD PRESSURE: 76 MMHG | BODY MASS INDEX: 31.22 KG/M2 | WEIGHT: 223 LBS | HEIGHT: 71 IN | SYSTOLIC BLOOD PRESSURE: 118 MMHG

## 2022-11-21 DIAGNOSIS — I42.8 NICM (NONISCHEMIC CARDIOMYOPATHY) (HCC): ICD-10-CM

## 2022-11-21 DIAGNOSIS — I50.22 SYSTOLIC CHF, CHRONIC (HCC): ICD-10-CM

## 2022-11-21 DIAGNOSIS — I48.0 PAF (PAROXYSMAL ATRIAL FIBRILLATION) (HCC): ICD-10-CM

## 2022-11-21 DIAGNOSIS — Z95.810 BIVENTRICULAR ICD (IMPLANTABLE CARDIOVERTER-DEFIBRILLATOR) IN PLACE: Primary | ICD-10-CM

## 2022-11-21 DIAGNOSIS — Z95.810 PRESENCE OF BIVENTRICULAR AUTOMATIC CARDIOVERTER/DEFIBRILLATOR (AICD): Primary | ICD-10-CM

## 2022-11-21 DIAGNOSIS — Q20.5 CORRECTED TRANSPOSITION OF GREAT VESSELS: ICD-10-CM

## 2022-11-21 DIAGNOSIS — I44.2 COMPLETE AV BLOCK (HCC): ICD-10-CM

## 2022-11-21 DIAGNOSIS — I49.3 PVC (PREMATURE VENTRICULAR CONTRACTION): ICD-10-CM

## 2022-11-21 PROCEDURE — 3078F DIAST BP <80 MM HG: CPT | Performed by: NURSE PRACTITIONER

## 2022-11-21 PROCEDURE — 93306 TTE W/DOPPLER COMPLETE: CPT | Performed by: INTERNAL MEDICINE

## 2022-11-21 PROCEDURE — 99214 OFFICE O/P EST MOD 30 MIN: CPT | Performed by: NURSE PRACTITIONER

## 2022-11-21 PROCEDURE — 3074F SYST BP LT 130 MM HG: CPT | Performed by: NURSE PRACTITIONER

## 2022-11-21 NOTE — PROGRESS NOTES
No charge BIV ICD clinic check for LV stim. Device functioning appropriately as programmed. See scanned documents in media manger.

## 2022-11-21 NOTE — PATIENT INSTRUCTIONS
Your LV Lead Reposition procedure has been scheduled for 12/7/22 at 1:00 pm, at [facility].     Please report to Admitting Department by 11:00 am, or 2 hours prior to your scheduled procedure. Please bring a list of your current medications and medication bottles, if able, to the hospital on this day. You will be unable to drive after your procedure so please make sure to bring someone with you to your procedure. You will need to have nothing to eat or drink after midnight, the night prior to your procedure. You may have small sips of water, if needed, to take with your medication. You will need labs drawn prior to your procedure. Please go to have these drawn today. You should not stop your medication prior to your scheduled procedure. After your procedure, you will need to follow up with Dr. Mike Vasques nurse for a wound and device check. Your follow-up appointment has been scheduled for 12/16/22 at 9:00 am.      Hibiclens 4% topical solution   Patient it start Hibiclens application 5 days prior to procedure date     Directions Hibiclens 4%: Start cleanse 5 days prior to procedure   1. Rinse area (upper chest and upper arms) with water. 2. Apply minimum amount necessary to scrub the upper chest area from shoulder/neck to mid line of chest and to below the nipple each of  5 nights before the day of the procedure  3. Let solution dry.

## 2022-11-22 LAB
ANION GAP SERPL CALC-SCNC: 7 MMOL/L (ref 5–15)
BASOPHILS # BLD: 0 K/UL (ref 0–0.1)
BASOPHILS NFR BLD: 1 % (ref 0–1)
BUN SERPL-MCNC: 14 MG/DL (ref 6–20)
BUN/CREAT SERPL: 13 (ref 12–20)
CALCIUM SERPL-MCNC: 9.8 MG/DL (ref 8.5–10.1)
CHLORIDE SERPL-SCNC: 107 MMOL/L (ref 97–108)
CO2 SERPL-SCNC: 25 MMOL/L (ref 21–32)
CREAT SERPL-MCNC: 1.11 MG/DL (ref 0.7–1.3)
DIFFERENTIAL METHOD BLD: NORMAL
EOSINOPHIL # BLD: 0.3 K/UL (ref 0–0.4)
EOSINOPHIL NFR BLD: 5 % (ref 0–7)
ERYTHROCYTE [DISTWIDTH] IN BLOOD BY AUTOMATED COUNT: 12.7 % (ref 11.5–14.5)
GLUCOSE SERPL-MCNC: 105 MG/DL (ref 65–100)
HCT VFR BLD AUTO: 46.9 % (ref 36.6–50.3)
HGB BLD-MCNC: 15.6 G/DL (ref 12.1–17)
IMM GRANULOCYTES # BLD AUTO: 0 K/UL (ref 0–0.04)
IMM GRANULOCYTES NFR BLD AUTO: 0 % (ref 0–0.5)
LYMPHOCYTES # BLD: 1.2 K/UL (ref 0.8–3.5)
LYMPHOCYTES NFR BLD: 18 % (ref 12–49)
MCH RBC QN AUTO: 31.3 PG (ref 26–34)
MCHC RBC AUTO-ENTMCNC: 33.3 G/DL (ref 30–36.5)
MCV RBC AUTO: 94 FL (ref 80–99)
MONOCYTES # BLD: 0.5 K/UL (ref 0–1)
MONOCYTES NFR BLD: 8 % (ref 5–13)
NEUTS SEG # BLD: 4.8 K/UL (ref 1.8–8)
NEUTS SEG NFR BLD: 68 % (ref 32–75)
NRBC # BLD: 0 K/UL (ref 0–0.01)
NRBC BLD-RTO: 0 PER 100 WBC
PLATELET # BLD AUTO: 262 K/UL (ref 150–400)
PMV BLD AUTO: 11 FL (ref 8.9–12.9)
POTASSIUM SERPL-SCNC: 4.6 MMOL/L (ref 3.5–5.1)
RBC # BLD AUTO: 4.99 M/UL (ref 4.1–5.7)
SODIUM SERPL-SCNC: 139 MMOL/L (ref 136–145)
WBC # BLD AUTO: 6.9 K/UL (ref 4.1–11.1)

## 2022-11-22 NOTE — PROGRESS NOTES
Labs without significant acute abnormality. OK to proceed with upcoming procedure as scheduled.     Future Appointments  12/16/2022 9:00 AM    PACEMAKER3, JJ MICHAEL AMB  12/16/2022 9:30 AM    WOUND CHECKS, JJ MICHAEL AMB  2/22/2023  8:15 AM    REMOTE1, JJ MICHAEL AMB  9/11/2023  8:30 AM    AMANDA Mendosa AMB  9/21/2023  11:20 AM   PACEMAKER3, JJ MICHAEL AMB  9/21/2023  11:40 AM   MD ROSE MARY Guevara              BS AMB

## 2022-11-24 LAB
ECHO AO ASC DIAM: 3.9 CM
ECHO AO ASCENDING AORTA INDEX: 1.76 CM/M2
ECHO AO ROOT DIAM: 3.5 CM
ECHO AO ROOT INDEX: 1.58 CM/M2
ECHO AV PEAK GRADIENT: 12 MMHG
ECHO AV PEAK VELOCITY: 1.7 M/S
ECHO AV VELOCITY RATIO: 0.47
ECHO EST RA PRESSURE: 3 MMHG
ECHO LA DIAMETER INDEX: 1.99 CM/M2
ECHO LA DIAMETER: 4.4 CM
ECHO LA TO AORTIC ROOT RATIO: 1.26
ECHO LA VOL 2C: 58 ML (ref 18–58)
ECHO LA VOL 4C: 68 ML (ref 18–58)
ECHO LA VOL BP: 69 ML (ref 18–58)
ECHO LA VOL/BSA BIPLANE: 31 ML/M2 (ref 16–34)
ECHO LA VOLUME AREA LENGTH: 71 ML
ECHO LA VOLUME INDEX A2C: 26 ML/M2 (ref 16–34)
ECHO LA VOLUME INDEX A4C: 31 ML/M2 (ref 16–34)
ECHO LA VOLUME INDEX AREA LENGTH: 32 ML/M2 (ref 16–34)
ECHO LV E' LATERAL VELOCITY: 11 CM/S
ECHO LV E' SEPTAL VELOCITY: 10 CM/S
ECHO LV FRACTIONAL SHORTENING: 34 % (ref 28–44)
ECHO LV INTERNAL DIMENSION DIASTOLE INDEX: 3.48 CM/M2
ECHO LV INTERNAL DIMENSION DIASTOLIC: 7.7 CM (ref 4.2–5.9)
ECHO LV INTERNAL DIMENSION SYSTOLIC INDEX: 2.31 CM/M2
ECHO LV INTERNAL DIMENSION SYSTOLIC: 5.1 CM
ECHO LV IVSD: 1.3 CM (ref 0.6–1)
ECHO LV MASS 2D: 503.7 G (ref 88–224)
ECHO LV MASS INDEX 2D: 227.9 G/M2 (ref 49–115)
ECHO LV POSTERIOR WALL DIASTOLIC: 1.2 CM (ref 0.6–1)
ECHO LV RELATIVE WALL THICKNESS RATIO: 0.31
ECHO LVOT PEAK GRADIENT: 3 MMHG
ECHO LVOT PEAK VELOCITY: 0.8 M/S
ECHO MV A VELOCITY: 0.69 M/S
ECHO MV E DECELERATION TIME (DT): 156.1 MS
ECHO MV E VELOCITY: 0.84 M/S
ECHO MV E/A RATIO: 1.22
ECHO MV E/E' LATERAL: 7.64
ECHO MV E/E' RATIO (AVERAGED): 8.02
ECHO MV E/E' SEPTAL: 8.4
ECHO RIGHT VENTRICULAR SYSTOLIC PRESSURE (RVSP): 35 MMHG
ECHO RV FREE WALL PEAK S': 19 CM/S
ECHO RV TAPSE: 2.6 CM (ref 1.7–?)
ECHO TV REGURGITANT MAX VELOCITY: 2.83 M/S
ECHO TV REGURGITANT PEAK GRADIENT: 32 MMHG

## 2022-11-24 NOTE — PROGRESS NOTES
LVEF slightly down from 40% to 35%  Future Appointments  12/16/2022 9:00 AM    PACEMAKER3, JJ MICHAEL AMB  12/16/2022 9:30 AM    WOUND CHECKS, JJ MICHAEL AMB  2/22/2023  8:15 AM    REMOTE1, JJ MICHAEL AMB  9/11/2023  8:30 AM    Viviann Kawasaki, PA-C BSIMA BS AMB  9/21/2023  11:20 AM   PACEMAKER3, JJ MICHAEL AMB  9/21/2023  11:40 AM   MD ROSE MARY Baumann              BS AMB

## 2022-11-28 ENCOUNTER — TELEPHONE (OUTPATIENT)
Dept: CARDIOLOGY CLINIC | Age: 57
End: 2022-11-28

## 2022-11-28 NOTE — TELEPHONE ENCOUNTER
Received call from Fozia Rodrigues with [facility], patient ID verified using two patient identifiers. She is asking for CPT codes for patient's upcoming procedure with Dr. Noble Watts on 12/7/22 @ [place] [address]. Provided [place] of 07697, and [id number]. Fozia Rodrigues will call back with any further questions.

## 2022-11-28 NOTE — TELEPHONE ENCOUNTER
[facility] called to confirm the cpt for the pt ELECTROPHYSIOLOGY PROCEDURE  with  on 12/07/2022 to ensure it is covered by insurance.     [facility] [place])  [phone] ext [phone]

## 2022-11-29 ENCOUNTER — TELEPHONE (OUTPATIENT)
Dept: CARDIOLOGY CLINIC | Age: 57
End: 2022-11-29

## 2022-11-29 NOTE — TELEPHONE ENCOUNTER
----- Message from Nani Zuluaga MD sent at 11/24/2022 10:00 AM EST -----  LVEF slightly down from 40% to 35%  Future Appointments  12/16/2022 9:00 AM    PACEMAKER3, JJ BAZZI              BS AMB  12/16/2022 9:30 AM    WOUND CHECKS, JJ MICHAEL AMB  2/22/2023  8:15 AM    REMOTE1, JJ MICHAEL AMB  9/11/2023  8:30 AM    AMANDA Marte [facility]  9/21/2023  11:20 AM   PACEMAKER3, JJ MICHAEL [facility]  9/21/2023  11:40 AM   Marcy Gautam, MD BAZZI              BS AMB
Mychart message sent with echo results
Do not make important decisions/No heavy lifting/straining/Do not drive or operate machinery/Bathing allowed

## 2022-11-30 RX ORDER — SODIUM CHLORIDE 0.9 % (FLUSH) 0.9 %
5-40 SYRINGE (ML) INJECTION EVERY 8 HOURS
OUTPATIENT
Start: 2022-11-30

## 2022-11-30 RX ORDER — SODIUM CHLORIDE 0.9 % (FLUSH) 0.9 %
5-40 SYRINGE (ML) INJECTION AS NEEDED
OUTPATIENT
Start: 2022-11-30

## 2022-12-07 ENCOUNTER — HOSPITAL ENCOUNTER (OUTPATIENT)
Age: 57
Setting detail: OUTPATIENT SURGERY
Discharge: HOME | End: 2022-12-07
Attending: INTERNAL MEDICINE | Admitting: INTERNAL MEDICINE
Payer: COMMERCIAL

## 2022-12-07 VITALS
OXYGEN SATURATION: 97 % | HEIGHT: 71 IN | WEIGHT: 222 LBS | SYSTOLIC BLOOD PRESSURE: 118 MMHG | RESPIRATION RATE: 20 BRPM | TEMPERATURE: 98.6 F | BODY MASS INDEX: 31.08 KG/M2 | DIASTOLIC BLOOD PRESSURE: 67 MMHG | HEART RATE: 75 BPM

## 2022-12-07 DIAGNOSIS — J45.41 MODERATE PERSISTENT ASTHMA WITH ACUTE EXACERBATION: ICD-10-CM

## 2022-12-07 DIAGNOSIS — I42.8 CARDIOMYOPATHY, NONISCHEMIC (HCC): ICD-10-CM

## 2022-12-07 DIAGNOSIS — Z95.810 BIVENTRICULAR ICD (IMPLANTABLE CARDIOVERTER-DEFIBRILLATOR) IN PLACE: Primary | ICD-10-CM

## 2022-12-07 RX ORDER — HYDROCODONE BITARTRATE AND ACETAMINOPHEN 5; 325 MG/1; MG/1
1 TABLET ORAL
Status: CANCELLED | OUTPATIENT
Start: 2022-12-07

## 2022-12-07 RX ORDER — CALCIUM CARBONATE 200(500)MG
2 TABLET,CHEWABLE ORAL AS NEEDED
COMMUNITY

## 2022-12-07 RX ORDER — SODIUM CHLORIDE 0.9 % (FLUSH) 0.9 %
5-40 SYRINGE (ML) INJECTION AS NEEDED
Status: CANCELLED | OUTPATIENT
Start: 2022-12-07

## 2022-12-07 RX ORDER — ACETAMINOPHEN 325 MG/1
650 TABLET ORAL
Status: CANCELLED | OUTPATIENT
Start: 2022-12-07

## 2022-12-07 RX ORDER — SODIUM CHLORIDE 0.9 % (FLUSH) 0.9 %
5-40 SYRINGE (ML) INJECTION EVERY 8 HOURS
Status: CANCELLED | OUTPATIENT
Start: 2022-12-07

## 2022-12-07 RX ORDER — ONDANSETRON 2 MG/ML
4 INJECTION INTRAMUSCULAR; INTRAVENOUS
Status: CANCELLED | OUTPATIENT
Start: 2022-12-07

## 2022-12-07 NOTE — INTERVAL H&P NOTE
Update History & Physical    The Patient's History and Physical of November 21, 2022 was reviewed with the patient and I examined the patient. There was no change. The surgical site was confirmed by the patient and me. Plan:  The risk, benefits, expected outcome, and alternative to the recommended procedure have been discussed with the patient. Patient understands and wants to proceed with the procedure.     Electronically signed by Laura Shaver MD on 12/7/2022 at 11:10 AM

## 2022-12-07 NOTE — INTERVAL H&P NOTE
Update History & Physical    The Patient's History and Physical of November 21, 2022 was reviewed with the patient and I examined the patient. There was change    LV lead stimulation has been less prominent  Threshold is stable at 0.4 V @ 1.2 ms  He is comfortable with 0.7 V @ 1.2 ms. The surgical site was confirmed by the patient and me and skin is thin with many scars    Plan:  The risk, benefits, expected outcome, and alternative are explained to him again.   I recommend him wait until diaphragmatic stim is worse to revise or reimplant the LV lead due to risk of infection    Electronically signed by Isabella Jha MD on 12/7/2022 at 2:01 PM

## 2022-12-07 NOTE — DISCHARGE INSTRUCTIONS
Patient Instructions   Resume activities as before  Do not need wound check 12/16    Future Appointments   Date Time Provider Karan Destini   12/16/2022  9:00 AM PACEMAKER3, JJ BAZZI BS AMB   12/16/2022  9:30 AM WOUND CHECKS, JJ BAZZI BS AMB   2/22/2023  8:15 AM REMOTE1, JJ MICHAEL AMB   9/11/2023  8:30 AM AMANDA Estrada BS AMB   9/21/2023 11:20 AM PACEMAKER3, JJ BAZZI BS AMB   9/21/2023 11:40 AM MD Deandre Wang M.D.  [facility]  Electrophysiology/Cardiology  [facility] and [facility] [place]  [address], [facility] [address] [place] [address]  [phone]

## 2023-02-27 ENCOUNTER — OFFICE VISIT (OUTPATIENT)
Dept: CARDIOLOGY CLINIC | Age: 58
End: 2023-02-27
Payer: COMMERCIAL

## 2023-02-27 DIAGNOSIS — Z95.810 PRESENCE OF CARDIOVERTER DEFIBRILLATOR: Primary | ICD-10-CM

## 2023-02-27 NOTE — PROGRESS NOTES
C/ [employer]ril[employer] REMOTE   Scheduled remote transmission. Device functioning appropriately as programmed. See scanned documents.  Chargeable visit

## 2023-07-11 NOTE — TELEPHONE ENCOUNTER
PCP: Angela Steel PA-C     Last appt:  9/1/2022      Future Appointments   Date Time Provider [address]   9/11/2023  8:30 AM YG Ybarra BS AMB   9/21/2023 11:20 AM PACEMAKER3, Summer RICHEY BS AMB   9/21/2023 11:40 AM MD ROSSI Reese BS AMB          Requested Prescriptions     Pending Prescriptions Disp Refills    mometasone-formoterol (DULERA) 200-5 MCG/ACT inhaler 13 g 2     Sig: Inhale 2 puffs into the lungs 2 times daily

## 2023-09-11 ENCOUNTER — OFFICE VISIT (OUTPATIENT)
Facility: CLINIC | Age: 58
End: 2023-09-11
Payer: COMMERCIAL

## 2023-09-11 VITALS
HEART RATE: 60 BPM | BODY MASS INDEX: 32.62 KG/M2 | OXYGEN SATURATION: 95 % | SYSTOLIC BLOOD PRESSURE: 115 MMHG | HEIGHT: 71 IN | DIASTOLIC BLOOD PRESSURE: 77 MMHG | RESPIRATION RATE: 16 BRPM | WEIGHT: 233 LBS | TEMPERATURE: 98.1 F

## 2023-09-11 DIAGNOSIS — E78.00 HYPERCHOLESTEROLEMIA WITHOUT HYPERTRIGLYCERIDEMIA: ICD-10-CM

## 2023-09-11 DIAGNOSIS — L60.9 ABNORMALITY OF NAIL SURFACE: ICD-10-CM

## 2023-09-11 DIAGNOSIS — I48.0 PAROXYSMAL ATRIAL FIBRILLATION (HCC): ICD-10-CM

## 2023-09-11 DIAGNOSIS — Z12.5 PROSTATE CANCER SCREENING: ICD-10-CM

## 2023-09-11 DIAGNOSIS — Z00.00 PHYSICAL EXAM: Primary | ICD-10-CM

## 2023-09-11 DIAGNOSIS — I10 HYPERTENSION, ESSENTIAL, BENIGN: ICD-10-CM

## 2023-09-11 DIAGNOSIS — Z95.810 BIVENTRICULAR ICD (IMPLANTABLE CARDIOVERTER-DEFIBRILLATOR) IN PLACE: ICD-10-CM

## 2023-09-11 DIAGNOSIS — M25.50 POLYARTHRALGIA: ICD-10-CM

## 2023-09-11 DIAGNOSIS — I42.0 DILATED CARDIOMYOPATHY (HCC): ICD-10-CM

## 2023-09-11 DIAGNOSIS — J45.40 MODERATE PERSISTENT ASTHMA WITHOUT COMPLICATION: ICD-10-CM

## 2023-09-11 PROCEDURE — 3074F SYST BP LT 130 MM HG: CPT | Performed by: PHYSICIAN ASSISTANT

## 2023-09-11 PROCEDURE — 99396 PREV VISIT EST AGE 40-64: CPT | Performed by: PHYSICIAN ASSISTANT

## 2023-09-11 PROCEDURE — 3078F DIAST BP <80 MM HG: CPT | Performed by: PHYSICIAN ASSISTANT

## 2023-09-11 PROCEDURE — 99213 OFFICE O/P EST LOW 20 MIN: CPT | Performed by: PHYSICIAN ASSISTANT

## 2023-09-11 RX ORDER — SIMVASTATIN 10 MG
TABLET ORAL
COMMUNITY
End: 2023-09-14 | Stop reason: ALTCHOICE

## 2023-09-11 RX ORDER — CLINDAMYCIN HYDROCHLORIDE 300 MG/1
CAPSULE ORAL
COMMUNITY

## 2023-09-11 RX ORDER — ALPRAZOLAM 0.5 MG/1
TABLET ORAL
COMMUNITY

## 2023-09-11 RX ORDER — ATORVASTATIN CALCIUM 40 MG/1
TABLET, FILM COATED ORAL
COMMUNITY
End: 2023-09-14 | Stop reason: ALTCHOICE

## 2023-09-11 SDOH — ECONOMIC STABILITY: INCOME INSECURITY: HOW HARD IS IT FOR YOU TO PAY FOR THE VERY BASICS LIKE FOOD, HOUSING, MEDICAL CARE, AND HEATING?: NOT HARD AT ALL

## 2023-09-11 SDOH — ECONOMIC STABILITY: HOUSING INSECURITY
IN THE LAST 12 MONTHS, WAS THERE A TIME WHEN YOU DID NOT HAVE A STEADY PLACE TO SLEEP OR SLEPT IN A SHELTER (INCLUDING NOW)?: NO

## 2023-09-11 SDOH — ECONOMIC STABILITY: FOOD INSECURITY: WITHIN THE PAST 12 MONTHS, YOU WORRIED THAT YOUR FOOD WOULD RUN OUT BEFORE YOU GOT MONEY TO BUY MORE.: NEVER TRUE

## 2023-09-11 SDOH — ECONOMIC STABILITY: FOOD INSECURITY: WITHIN THE PAST 12 MONTHS, THE FOOD YOU BOUGHT JUST DIDN'T LAST AND YOU DIDN'T HAVE MONEY TO GET MORE.: NEVER TRUE

## 2023-09-11 ASSESSMENT — ENCOUNTER SYMPTOMS
ABDOMINAL PAIN: 0
NAUSEA: 0
DIARRHEA: 0
SHORTNESS OF BREATH: 0
BLOOD IN STOOL: 0
CONSTIPATION: 0
VOMITING: 0

## 2023-09-11 ASSESSMENT — PATIENT HEALTH QUESTIONNAIRE - PHQ9
SUM OF ALL RESPONSES TO PHQ QUESTIONS 1-9: 0
2. FEELING DOWN, DEPRESSED OR HOPELESS: 0
SUM OF ALL RESPONSES TO PHQ9 QUESTIONS 1 & 2: 0
SUM OF ALL RESPONSES TO PHQ QUESTIONS 1-9: 0
SUM OF ALL RESPONSES TO PHQ QUESTIONS 1-9: 0
1. LITTLE INTEREST OR PLEASURE IN DOING THINGS: 0
SUM OF ALL RESPONSES TO PHQ QUESTIONS 1-9: 0

## 2023-09-11 NOTE — PROGRESS NOTES
Erika Cox is a 62y.o. year old male seen in clinic today for   Chief Complaint   Patient presents with    Annual Exam     Hx of CHF, Asthma, HLD, HTN  he is here today to follow up for CPE  New issues, continues to have BL hand pains, R knee and hip pains. Feels like intermittent and comes in \"episodes\". Always worst at end of day. 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 Had rheumassure test done in 2020 negative. Also has noticed changes to his nail texture, some indentation on few nails. CHF: Has ICD in place and pacemaker. Had power turned down a bit as he was getting more discomfort in chest from wires. Cardiology team decided to not redo surgery and cut power back which has helped. Continues to use flecainide twice a day. Notes definitiely feels SOB at times. Continues to use Dulera daily, not requiring any rescue inhaler    Patient has has hx of HLD. Takes 40 mg lipitor. No RUQ pain, no jaundice, no muscle aches. he specifically denies any CP, SOB, HA. Dizziness, fevers, chills, N/V/D, urinary symptoms or other bowel changes.     Current Outpatient Medications on File Prior to Visit   Medication Sig Dispense Refill    Aspirin 325 MG CAPS aspirin      Multiple Vitamins-Minerals (MULTIVITAMIN ADULT EXTRA C PO) Take 1 tablet by mouth daily      simvastatin (ZOCOR) 10 MG tablet Zocor      atorvastatin (LIPITOR) 40 MG tablet Lipitor      ALPRAZolam (XANAX) 0.5 MG tablet alprazolam 0.5 mg tablet   TAKE 1 TABLET BY MOUTH TWICE DAILY AS NEEDED FOR ANXIETY      clindamycin (CLEOCIN) 300 MG capsule clindamycin HCl 300 mg capsule   TAKE 2 CAPSULES BY MOUT ONCE FOR 1 DOSE. TAKE 1 HOUR PRIOR TO DENTAL WORK      mometasone-formoterol (DULERA) 200-5 MCG/ACT inhaler Inhale 2 puffs into the lungs 2 times daily 13 g 2    albuterol sulfate HFA (PROVENTIL;VENTOLIN;PROAIR) 108 (90 Base) MCG/ACT inhaler INHALE TWO PUFFS BY MOUTH EVERY 4 HOURS AS NEEDED FOR  WHEEZING      atorvastatin (LIPITOR) 40

## 2023-09-14 RX ORDER — ATORVASTATIN CALCIUM 40 MG/1
40 TABLET, FILM COATED ORAL DAILY
Qty: 90 TABLET | Refills: 3 | Status: SHIPPED | OUTPATIENT
Start: 2023-09-14

## 2023-09-14 NOTE — TELEPHONE ENCOUNTER
PCP: Al Márquez PA-C     Last appt:  9/11/2023      Future Appointments   Date Time Provider [address]   9/21/2023 11:20 AM PACEMAKER3, KATHLEEN LOAIZA AMB   9/17/2024  3:40 PM PACEMAKER3, KATHLEEN LOAIZA AMB   9/17/2024  4:00 PM MD ROSSI Hyde BS AMB          Requested Prescriptions     Pending Prescriptions Disp Refills    atorvastatin (LIPITOR) 40 MG tablet [Pharmacy Med Name: Atorvastatin Calcium 40 MG Oral Tablet] 90 tablet 0     Sig: Take 1 tablet by mouth once daily

## 2023-09-21 ENCOUNTER — PROCEDURE VISIT (OUTPATIENT)
Age: 58
End: 2023-09-21

## 2023-09-21 ENCOUNTER — OFFICE VISIT (OUTPATIENT)
Age: 58
End: 2023-09-21

## 2023-09-21 VITALS — DIASTOLIC BLOOD PRESSURE: 74 MMHG | BODY MASS INDEX: 32.5 KG/M2 | SYSTOLIC BLOOD PRESSURE: 128 MMHG | HEIGHT: 71 IN

## 2023-09-21 DIAGNOSIS — Z95.810 INPLANTABLE CARDIOVERTER-DEFIBRILLATOR IN PLACE, PRE-OPERATIVE CARDIOVASCULAR EXAMINATION: Primary | ICD-10-CM

## 2023-09-21 DIAGNOSIS — I47.29 NSVT (NONSUSTAINED VENTRICULAR TACHYCARDIA) (HCC): ICD-10-CM

## 2023-09-21 DIAGNOSIS — I44.2 ATRIOVENTRICULAR BLOCK, COMPLETE (HCC): ICD-10-CM

## 2023-09-21 DIAGNOSIS — Z95.810 PRESENCE OF BIVENTRICULAR AUTOMATIC CARDIOVERTER/DEFIBRILLATOR (AICD): Primary | ICD-10-CM

## 2023-09-21 DIAGNOSIS — Z01.810 INPLANTABLE CARDIOVERTER-DEFIBRILLATOR IN PLACE, PRE-OPERATIVE CARDIOVASCULAR EXAMINATION: Primary | ICD-10-CM

## 2023-09-21 DIAGNOSIS — I50.22 CHRONIC SYSTOLIC (CONGESTIVE) HEART FAILURE (HCC): ICD-10-CM

## 2023-09-21 ASSESSMENT — PATIENT HEALTH QUESTIONNAIRE - PHQ9
SUM OF ALL RESPONSES TO PHQ QUESTIONS 1-9: 0
SUM OF ALL RESPONSES TO PHQ QUESTIONS 1-9: 0
1. LITTLE INTEREST OR PLEASURE IN DOING THINGS: 0
SUM OF ALL RESPONSES TO PHQ QUESTIONS 1-9: 0
2. FEELING DOWN, DEPRESSED OR HOPELESS: 0
SUM OF ALL RESPONSES TO PHQ QUESTIONS 1-9: 0
SUM OF ALL RESPONSES TO PHQ9 QUESTIONS 1 & 2: 0

## 2023-09-21 NOTE — PROGRESS NOTES
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 Future Appointments   Date Time Provider [address]   9/17/2024  3:00 PM BSC KATHLEEN ECHO 2 ROSSI BS AMB   9/17/2024  3:40 PM PACEMAKER3, KATHLEEN RICHEY BS AMB   9/17/2024  4:00 PM MD ROSSI Warren BS AMB      Thank you for involving me in this patient's care and please call with further concerns or questions. Blanca Yates M.D.    Electrophysiology/Cardiology   [facility] and [facility] [place]   [address] [place] [address], [address] [place] [address],[address]   [facility], [facility] [address]                             [address] [facility]   [phone]

## 2023-10-02 LAB
BASOPHILS # BLD AUTO: 0 X10E3/UL (ref 0–0.2)
BASOPHILS NFR BLD AUTO: 1 %
EOSINOPHIL # BLD AUTO: 0.4 X10E3/UL (ref 0–0.4)
EOSINOPHIL NFR BLD AUTO: 6 %
ERYTHROCYTE [DISTWIDTH] IN BLOOD BY AUTOMATED COUNT: 12.2 % (ref 11.6–15.4)
HCT VFR BLD AUTO: 44.6 % (ref 37.5–51)
HGB BLD-MCNC: 15.9 G/DL (ref 13–17.7)
IMM GRANULOCYTES # BLD AUTO: 0 X10E3/UL (ref 0–0.1)
IMM GRANULOCYTES NFR BLD AUTO: 0 %
LYMPHOCYTES # BLD AUTO: 1.5 X10E3/UL (ref 0.7–3.1)
LYMPHOCYTES NFR BLD AUTO: 22 %
MCH RBC QN AUTO: 32 PG (ref 26.6–33)
MCHC RBC AUTO-ENTMCNC: 35.7 G/DL (ref 31.5–35.7)
MCV RBC AUTO: 90 FL (ref 79–97)
MONOCYTES # BLD AUTO: 0.5 X10E3/UL (ref 0.1–0.9)
MONOCYTES NFR BLD AUTO: 8 %
NEUTROPHILS # BLD AUTO: 4.2 X10E3/UL (ref 1.4–7)
NEUTROPHILS NFR BLD AUTO: 63 %
PLATELET # BLD AUTO: 218 X10E3/UL (ref 150–450)
RBC # BLD AUTO: 4.97 X10E6/UL (ref 4.14–5.8)
WBC # BLD AUTO: 6.7 X10E3/UL (ref 3.4–10.8)

## 2023-10-03 LAB
ALBUMIN SERPL-MCNC: 4.7 G/DL (ref 3.8–4.9)
ALBUMIN/GLOB SERPL: 2 {RATIO} (ref 1.2–2.2)
ALP SERPL-CCNC: 69 IU/L (ref 44–121)
ALT SERPL-CCNC: 28 IU/L (ref 0–44)
APPEARANCE UR: CLEAR
AST SERPL-CCNC: 22 IU/L (ref 0–40)
BILIRUB SERPL-MCNC: 0.8 MG/DL (ref 0–1.2)
BILIRUB UR QL STRIP: NEGATIVE
BUN SERPL-MCNC: 15 MG/DL (ref 6–24)
BUN/CREAT SERPL: 13 (ref 9–20)
CALCIUM SERPL-MCNC: 9.7 MG/DL (ref 8.7–10.2)
CENTROMERE B AB SER-ACNC: <0.2 AI (ref 0–0.9)
CHLORIDE SERPL-SCNC: 103 MMOL/L (ref 96–106)
CHOLEST SERPL-MCNC: 192 MG/DL (ref 100–199)
CHROMATIN AB SERPL-ACNC: <0.2 AI (ref 0–0.9)
CO2 SERPL-SCNC: 22 MMOL/L (ref 20–29)
COLOR UR: YELLOW
CREAT SERPL-MCNC: 1.13 MG/DL (ref 0.76–1.27)
CRP SERPL-MCNC: 1 MG/L (ref 0–10)
DSDNA AB SER-ACNC: <1 IU/ML (ref 0–9)
EGFRCR SERPLBLD CKD-EPI 2021: 75 ML/MIN/1.73
ENA JO1 AB SER-ACNC: <0.2 AI (ref 0–0.9)
ENA RNP AB SER-ACNC: <0.2 AI (ref 0–0.9)
ENA SCL70 AB SER-ACNC: <0.2 AI (ref 0–0.9)
ENA SM AB SER-ACNC: <0.2 AI (ref 0–0.9)
ENA SS-A AB SER-ACNC: <0.2 AI (ref 0–0.9)
ENA SS-B AB SER-ACNC: <0.2 AI (ref 0–0.9)
ERYTHROCYTE [SEDIMENTATION RATE] IN BLOOD BY WESTERGREN METHOD: 5 MM/HR (ref 0–30)
FERRITIN SERPL-MCNC: 182 NG/ML (ref 30–400)
FOLATE SERPL-MCNC: 15 NG/ML
GLOBULIN SER CALC-MCNC: 2.4 G/DL (ref 1.5–4.5)
GLUCOSE SERPL-MCNC: 96 MG/DL (ref 70–99)
GLUCOSE UR QL STRIP: NEGATIVE
HDLC SERPL-MCNC: 65 MG/DL
HGB UR QL STRIP: NEGATIVE
IRON SATN MFR SERPL: 29 % (ref 15–55)
IRON SERPL-MCNC: 110 UG/DL (ref 38–169)
KETONES UR QL STRIP: NEGATIVE
LDLC SERPL CALC-MCNC: 106 MG/DL (ref 0–99)
LEUKOCYTE ESTERASE UR QL STRIP: NEGATIVE
Lab: NORMAL
NITRITE UR QL STRIP: NEGATIVE
PH UR STRIP: 6.5 [PH] (ref 5–7.5)
POTASSIUM SERPL-SCNC: 4.8 MMOL/L (ref 3.5–5.2)
PROT SERPL-MCNC: 7.1 G/DL (ref 6–8.5)
PROT UR QL STRIP: NEGATIVE
PSA SERPL-MCNC: 2 NG/ML (ref 0–4)
SODIUM SERPL-SCNC: 140 MMOL/L (ref 134–144)
SP GR UR STRIP: 1 (ref 1–1.03)
TIBC SERPL-MCNC: 376 UG/DL (ref 250–450)
TRIGL SERPL-MCNC: 119 MG/DL (ref 0–149)
TSH SERPL DL<=0.005 MIU/L-ACNC: 2.22 UIU/ML (ref 0.45–4.5)
UIBC SERPL-MCNC: 266 UG/DL (ref 111–343)
URATE SERPL-MCNC: 7.7 MG/DL (ref 3.8–8.4)
UROBILINOGEN UR STRIP-MCNC: 0.2 MG/DL (ref 0.2–1)
VIT B12 SERPL-MCNC: 896 PG/ML (ref 232–1245)
VLDLC SERPL CALC-MCNC: 21 MG/DL (ref 5–40)

## 2023-10-11 LAB
14-3-3 ETA AG SER IA-MCNC: <0.2 NG/ML
CCP IGA+IGG SERPL IA-ACNC: <20 UNITS
RHEUMATOID FACT SERPL-ACNC: <14 UNITS/ML

## 2023-11-16 ENCOUNTER — E-VISIT (OUTPATIENT)
Facility: CLINIC | Age: 58
End: 2023-11-16

## 2023-11-16 DIAGNOSIS — B34.9 VIRAL SYNDROME: Primary | ICD-10-CM

## 2023-11-16 ASSESSMENT — LIFESTYLE VARIABLES: SMOKING_STATUS: NO, I'VE NEVER SMOKED

## 2023-11-16 NOTE — PROGRESS NOTES
Patient with sinus pressure with brown/bloody discharge, sore throat, body aches and fever. Appears to be viral. Has hx of sinus infections. Started yesterday. Recommend viral work up with COVID and flu testing.

## 2024-01-03 RX ORDER — FLECAINIDE ACETATE 100 MG/1
TABLET ORAL
Qty: 180 TABLET | Refills: 1 | Status: SHIPPED | OUTPATIENT
Start: 2024-01-03

## 2024-01-03 NOTE — TELEPHONE ENCOUNTER
Med refilled per VO by MD.    Future Appointments   Date Time Provider Department Center   9/17/2024  3:00 PM BSC WANG ECHO 2 ROSSI LOAIZA AMB   9/17/2024  3:40 PM PACEMAKER3, KATHLEEN LOAIZA AMB   9/17/2024  4:00 PM Dariusz Peters MD CAVREY BS AMB

## 2024-01-29 RX ORDER — METOPROLOL SUCCINATE 50 MG/1
TABLET, EXTENDED RELEASE ORAL
Qty: 90 TABLET | Refills: 1 | Status: SHIPPED | OUTPATIENT
Start: 2024-01-29

## 2024-01-29 NOTE — TELEPHONE ENCOUNTER
VO per NP    Future Appointments   Date Time Provider Department Center   9/17/2024  3:00 PM BSC WANG ECHO 2 ROSSI LOAIZA AMB   9/17/2024  3:40 PM PACEMAKER3, KATHLEEN LOAIZA AMB   9/17/2024  4:00 PM Dariusz Peters MD CAVREY BS AMB

## 2024-03-13 RX ORDER — MOMETASONE FUROATE AND FORMOTEROL FUMARATE DIHYDRATE 200; 5 UG/1; UG/1
AEROSOL RESPIRATORY (INHALATION)
Qty: 13 G | Refills: 0 | Status: SHIPPED | OUTPATIENT
Start: 2024-03-13

## 2024-03-13 NOTE — TELEPHONE ENCOUNTER
PCP: Aguilar Carroll PA-C     Last appt:  11/16/2023      Future Appointments   Date Time Provider Department Center   9/17/2024  3:00 PM BSC WANG ECHO 2 CAVREY BS AMB   9/17/2024  3:40 PM PACEMAKER3KATHLEEN AMB   9/17/2024  4:00 PM Dariusz Peters MD CAVREY BS AMB          Requested Prescriptions     Pending Prescriptions Disp Refills    DULERA 200-5 MCG/ACT inhaler [Pharmacy Med Name: Dulera 200-5 MCG/ACT Inhalation Aerosol] 13 g 0     Sig: INHALE 2 PUFFS TWICE DAILY

## 2024-04-08 RX ORDER — SACUBITRIL AND VALSARTAN 24; 26 MG/1; MG/1
1 TABLET, FILM COATED ORAL 2 TIMES DAILY
Qty: 60 TABLET | Refills: 3 | Status: SHIPPED | OUTPATIENT
Start: 2024-04-08

## 2024-04-08 NOTE — TELEPHONE ENCOUNTER
Requested Prescriptions     Signed Prescriptions Disp Refills    sacubitril-valsartan (ENTRESTO) 24-26 MG per tablet 60 tablet 3     Sig: Take 1 tablet by mouth twice daily     Authorizing Provider: ENRIQUE PETERS     Ordering User: PRIYANKA VAUGHN       Med refilled per VO by MD.    Future Appointments   Date Time Provider Department Center   9/13/2024  1:30 PM Aguilar Carroll PA-C BSIMA BS AMB   9/17/2024  3:00 PM BSC WANG ECHO 2 ROSSI BS AMB   9/17/2024  3:40 PM PACEMAKER3KATHLEEN BS AMB   9/17/2024  4:00 PM Enrique Peters MD CAVREY BS AMB

## 2024-04-30 RX ORDER — MOMETASONE FUROATE AND FORMOTEROL FUMARATE DIHYDRATE 200; 5 UG/1; UG/1
2 AEROSOL RESPIRATORY (INHALATION) 2 TIMES DAILY
Qty: 13 G | Refills: 0 | Status: SHIPPED | OUTPATIENT
Start: 2024-04-30

## 2024-04-30 NOTE — TELEPHONE ENCOUNTER
PCP: Aguilar Carroll PA-C     Last appt:  11/16/2023      Future Appointments   Date Time Provider Department Center   9/13/2024  1:30 PM Aguilar Carroll PA-C BSIMA BS AMB   9/17/2024  3:00 PM JOSSE WANG ECHO 2 CAVREY  AMB   9/17/2024  3:40 PM PACEMAKER3, KATHLEEN RICHEY  AMB   9/17/2024  4:00 PM Dariusz Peters MD CAVREY  AMB          Requested Prescriptions     Pending Prescriptions Disp Refills    DULERA 200-5 MCG/ACT inhaler [Pharmacy Med Name: Dulera 200-5 MCG/ACT Inhalation Aerosol] 13 g 0     Sig: Inhale 2 puffs by mouth twice daily

## 2024-06-21 RX ORDER — FLECAINIDE ACETATE 100 MG/1
TABLET ORAL
Qty: 180 TABLET | Refills: 0 | Status: SHIPPED | OUTPATIENT
Start: 2024-06-21

## 2024-06-21 NOTE — TELEPHONE ENCOUNTER
Med refilled per VO by MD.    Future Appointments   Date Time Provider Department Center   9/24/2024  1:00 PM BSC WANG ECHO 2 ROSSI BS AMB   9/24/2024  2:00 PM PACEMAKER3, KATHLEEN RICHEY BS AMB   9/24/2024  2:20 PM Alexandria Peck APRN - NP ROSSI BS AMB   11/5/2024  1:00 PM Aguilar Carroll, YG MIRAMONTES BS AMB

## 2024-07-16 RX ORDER — MOMETASONE FUROATE AND FORMOTEROL FUMARATE DIHYDRATE 200; 5 UG/1; UG/1
AEROSOL RESPIRATORY (INHALATION)
Qty: 13 G | Refills: 3 | Status: SHIPPED | OUTPATIENT
Start: 2024-07-16

## 2024-07-16 NOTE — TELEPHONE ENCOUNTER
PCP: Aguilar Carroll PA-C     Last appt:  11/16/2023      Future Appointments   Date Time Provider Department Center   9/24/2024  1:00 PM BSC WANG ECHO 2 CAVREY BS AMB   9/24/2024  2:00 PM PACEMAKER3KATHLEEN AMB   9/24/2024  2:20 PM Alexandria Peck, APRN - NP ROSSI BS AMB   11/5/2024  1:00 PM Aguilar Carroll PA-C [facility] AMB          Requested Prescriptions     Pending Prescriptions Disp Refills    DULERA 200-5 MCG/ACT inhaler [Pharmacy Med Name: Dulera 200-5 MCG/ACT Inhalation Aerosol] 13 g 0     Sig: INHALE 2 PUFFS TWICE DAILY

## 2024-07-25 RX ORDER — METOPROLOL SUCCINATE 50 MG/1
TABLET, EXTENDED RELEASE ORAL
Qty: 90 TABLET | Refills: 1 | Status: SHIPPED | OUTPATIENT
Start: 2024-07-25

## 2024-08-12 PROCEDURE — 93295 DEV INTERROG REMOTE 1/2/MLT: CPT | Performed by: INTERNAL MEDICINE

## 2024-08-12 PROCEDURE — 93296 REM INTERROG EVL PM/IDS: CPT | Performed by: INTERNAL MEDICINE

## 2024-08-30 RX ORDER — ATORVASTATIN CALCIUM 40 MG/1
40 TABLET, FILM COATED ORAL DAILY
Qty: 90 TABLET | Refills: 0 | Status: SHIPPED | OUTPATIENT
Start: 2024-08-30

## 2024-08-30 NOTE — TELEPHONE ENCOUNTER
PCP: Aguilar Carroll PA-C     Last appt:  11/16/2023      Future Appointments   Date Time Provider Department Center   9/24/2024  1:00 PM BS WANG ECHO 2 CAVREY [facility]   9/24/2024  2:00 PM PACEMAKER3KATHLEEN [facility]   9/24/2024  2:20 PM Alexandria Peck, APRN - NP ROSSI [facility]   11/5/2024  1:00 PM Aguilar Carroll PA-C [facility]          Requested Prescriptions     Pending Prescriptions Disp Refills    atorvastatin (LIPITOR) 40 MG tablet [Pharmacy Med Name: Atorvastatin Calcium 40 MG Oral Tablet] 90 tablet 0     Sig: Take 1 tablet by mouth once daily

## 2024-09-03 RX ORDER — SACUBITRIL AND VALSARTAN 24; 26 MG/1; MG/1
1 TABLET, FILM COATED ORAL 2 TIMES DAILY
Qty: 60 TABLET | Refills: 3 | Status: SHIPPED | OUTPATIENT
Start: 2024-09-03

## 2024-09-03 NOTE — TELEPHONE ENCOUNTER
Med refilled per VO by MD.    Future Appointments   Date Time Provider Department Center   9/24/2024  1:00 PM BS KATHLEEN ECHO 2 ROSSI BS AMB   9/24/2024  2:00 PM PACEMAKER3, KATHLEEN RICHEY BS AMB   9/24/2024  2:20 PM Alexandria Peck APRN - NP ROSSI LOAIZA AMB   11/5/2024  1:00 PM Aguilar Carroll, YG BSIMA BS ECC DEP

## 2024-09-19 RX ORDER — FLECAINIDE ACETATE 100 MG/1
TABLET ORAL
Qty: 180 TABLET | Refills: 1 | Status: SHIPPED | OUTPATIENT
Start: 2024-09-19

## 2024-09-24 ENCOUNTER — NURSE ONLY (OUTPATIENT)
Age: 59
End: 2024-09-24

## 2024-09-24 ENCOUNTER — OFFICE VISIT (OUTPATIENT)
Age: 59
End: 2024-09-24
Payer: COMMERCIAL

## 2024-09-24 VITALS
BODY MASS INDEX: 32.37 KG/M2 | OXYGEN SATURATION: 96 % | HEART RATE: 68 BPM | SYSTOLIC BLOOD PRESSURE: 134 MMHG | WEIGHT: 231.2 LBS | HEIGHT: 71 IN | DIASTOLIC BLOOD PRESSURE: 82 MMHG

## 2024-09-24 DIAGNOSIS — I48.0 PAF (PAROXYSMAL ATRIAL FIBRILLATION) (HCC): ICD-10-CM

## 2024-09-24 DIAGNOSIS — I10 PRIMARY HYPERTENSION: ICD-10-CM

## 2024-09-24 DIAGNOSIS — Z95.810 BIVENTRICULAR IMPLANTABLE CARDIOVERTER-DEFIBRILLATOR (ICD) IN SITU: Primary | ICD-10-CM

## 2024-09-24 DIAGNOSIS — Z95.810 PRESENCE OF BIVENTRICULAR AUTOMATIC CARDIOVERTER/DEFIBRILLATOR (AICD): Primary | ICD-10-CM

## 2024-09-24 DIAGNOSIS — I50.22 CHRONIC SYSTOLIC CHF (CONGESTIVE HEART FAILURE), NYHA CLASS 2 (HCC): ICD-10-CM

## 2024-09-24 DIAGNOSIS — Q20.3 TRANSPOSITION OF THE GREAT ARTERIES: ICD-10-CM

## 2024-09-24 DIAGNOSIS — Z51.81 ENCOUNTER FOR MONITORING FLECAINIDE THERAPY: ICD-10-CM

## 2024-09-24 DIAGNOSIS — I42.8 NICM (NONISCHEMIC CARDIOMYOPATHY) (HCC): ICD-10-CM

## 2024-09-24 DIAGNOSIS — Z79.899 ENCOUNTER FOR MONITORING FLECAINIDE THERAPY: ICD-10-CM

## 2024-09-24 PROCEDURE — 99214 OFFICE O/P EST MOD 30 MIN: CPT | Performed by: NURSE PRACTITIONER

## 2024-09-24 PROCEDURE — 3079F DIAST BP 80-89 MM HG: CPT | Performed by: NURSE PRACTITIONER

## 2024-09-24 PROCEDURE — 3075F SYST BP GE 130 - 139MM HG: CPT | Performed by: NURSE PRACTITIONER

## 2024-09-24 PROCEDURE — 93000 ELECTROCARDIOGRAM COMPLETE: CPT | Performed by: NURSE PRACTITIONER

## 2024-10-01 ENCOUNTER — PROCEDURE VISIT (OUTPATIENT)
Age: 59
End: 2024-10-01

## 2024-10-01 DIAGNOSIS — Z95.810 BIVENTRICULAR IMPLANTABLE CARDIOVERTER-DEFIBRILLATOR (ICD) IN SITU: Primary | ICD-10-CM

## 2024-10-09 ENCOUNTER — TELEPHONE (OUTPATIENT)
Age: 59
End: 2024-10-09

## 2024-10-09 NOTE — TELEPHONE ENCOUNTER
----- Message from Dr. Dariusz Peters MD sent at 9/29/2024  9:52 PM EDT -----  congential heart disease  RV is systemic pumping chamber, hypertrophied and dilated with EF 45% estimated with ventricular pacing  Mild MR TR, trace AR WV    RA lead is old so no cardiac MRI    Continue with medications

## 2024-10-09 NOTE — TELEPHONE ENCOUNTER
Verified patient with two types of identifiers.   Spoke with patient and advised of echo results. Pt states that is he doing very well at this time and wanted to thank Dr Peters for his great care. Advised patient to continue with current medications and call and let us know if he needs anything prior to his next appt.  Patient verbalized understanding and will call with any other questions.        Future Appointments   Date Time Provider Department Center   11/5/2024  1:00 PM [facility]   10/14/2025  1:00 PM KATHLEEN MARIANO AMB   10/14/2025  1:20 PM Dariusz Peters MD CAVREY BS AMB

## 2024-11-05 ENCOUNTER — OFFICE VISIT (OUTPATIENT)
Facility: CLINIC | Age: 59
End: 2024-11-05
Payer: COMMERCIAL

## 2024-11-05 VITALS
WEIGHT: 230.6 LBS | HEART RATE: 61 BPM | BODY MASS INDEX: 32.28 KG/M2 | SYSTOLIC BLOOD PRESSURE: 117 MMHG | TEMPERATURE: 98.2 F | RESPIRATION RATE: 16 BRPM | DIASTOLIC BLOOD PRESSURE: 75 MMHG | OXYGEN SATURATION: 94 % | HEIGHT: 71 IN

## 2024-11-05 DIAGNOSIS — I42.0 DILATED CARDIOMYOPATHY (HCC): ICD-10-CM

## 2024-11-05 DIAGNOSIS — Z00.00 PHYSICAL EXAM: Primary | ICD-10-CM

## 2024-11-05 DIAGNOSIS — E66.09 CLASS 1 OBESITY DUE TO EXCESS CALORIES WITH SERIOUS COMORBIDITY AND BODY MASS INDEX (BMI) OF 32.0 TO 32.9 IN ADULT: ICD-10-CM

## 2024-11-05 DIAGNOSIS — Z12.5 PROSTATE CANCER SCREENING: ICD-10-CM

## 2024-11-05 DIAGNOSIS — I10 HYPERTENSION, ESSENTIAL, BENIGN: ICD-10-CM

## 2024-11-05 DIAGNOSIS — I48.0 PAROXYSMAL ATRIAL FIBRILLATION (HCC): ICD-10-CM

## 2024-11-05 DIAGNOSIS — E78.00 HYPERCHOLESTEROLEMIA WITHOUT HYPERTRIGLYCERIDEMIA: ICD-10-CM

## 2024-11-05 DIAGNOSIS — J45.40 MODERATE PERSISTENT ASTHMA WITHOUT COMPLICATION: ICD-10-CM

## 2024-11-05 DIAGNOSIS — E66.811 CLASS 1 OBESITY DUE TO EXCESS CALORIES WITH SERIOUS COMORBIDITY AND BODY MASS INDEX (BMI) OF 32.0 TO 32.9 IN ADULT: ICD-10-CM

## 2024-11-05 DIAGNOSIS — Z95.810 BIVENTRICULAR ICD (IMPLANTABLE CARDIOVERTER-DEFIBRILLATOR) IN PLACE: ICD-10-CM

## 2024-11-05 PROCEDURE — 99396 PREV VISIT EST AGE 40-64: CPT | Performed by: PHYSICIAN ASSISTANT

## 2024-11-05 PROCEDURE — 3074F SYST BP LT 130 MM HG: CPT | Performed by: PHYSICIAN ASSISTANT

## 2024-11-05 PROCEDURE — 3078F DIAST BP <80 MM HG: CPT | Performed by: PHYSICIAN ASSISTANT

## 2024-11-05 RX ORDER — ALPRAZOLAM 0.5 MG
0.5 TABLET ORAL 2 TIMES DAILY PRN
Qty: 15 TABLET | Refills: 0 | Status: SHIPPED | OUTPATIENT
Start: 2024-11-05 | End: 2024-11-20

## 2024-11-05 SDOH — ECONOMIC STABILITY: FOOD INSECURITY: WITHIN THE PAST 12 MONTHS, YOU WORRIED THAT YOUR FOOD WOULD RUN OUT BEFORE YOU GOT MONEY TO BUY MORE.: NEVER TRUE

## 2024-11-05 SDOH — ECONOMIC STABILITY: FOOD INSECURITY: WITHIN THE PAST 12 MONTHS, THE FOOD YOU BOUGHT JUST DIDN'T LAST AND YOU DIDN'T HAVE MONEY TO GET MORE.: NEVER TRUE

## 2024-11-05 SDOH — ECONOMIC STABILITY: INCOME INSECURITY: HOW HARD IS IT FOR YOU TO PAY FOR THE VERY BASICS LIKE FOOD, HOUSING, MEDICAL CARE, AND HEATING?: NOT HARD AT ALL

## 2024-11-05 ASSESSMENT — ENCOUNTER SYMPTOMS
NAUSEA: 0
SHORTNESS OF BREATH: 0
CONSTIPATION: 0
DIARRHEA: 0
VOMITING: 0
BLOOD IN STOOL: 0
ABDOMINAL PAIN: 0

## 2024-11-05 ASSESSMENT — PATIENT HEALTH QUESTIONNAIRE - PHQ9
SUM OF ALL RESPONSES TO PHQ QUESTIONS 1-9: 0
SUM OF ALL RESPONSES TO PHQ QUESTIONS 1-9: 0
1. LITTLE INTEREST OR PLEASURE IN DOING THINGS: NOT AT ALL
2. FEELING DOWN, DEPRESSED OR HOPELESS: NOT AT ALL
SUM OF ALL RESPONSES TO PHQ9 QUESTIONS 1 & 2: 0
SUM OF ALL RESPONSES TO PHQ QUESTIONS 1-9: 0
SUM OF ALL RESPONSES TO PHQ QUESTIONS 1-9: 0

## 2024-11-05 NOTE — PROGRESS NOTES
Luis Martinez  Identified pt with two pt identifiers(name and ).     Chief Complaint   Patient presents with    Annual Exam     Room 4A //        Reviewed record In preparation for visit and have obtained necessary documentation.     1. Have you been to the ER, urgent care clinic or hospitalized since your last visit? No     2. Have you seen or consulted any other health care providers outside of the [facility] since your last visit? Include any pap smears or colon screening. No    Patient does not have an advance directive.     Vitals reviewed with provider.    Health Maintenance reviewed:     Health Maintenance Due   Topic    HIV screen     Hepatitis B vaccine (1 of 3 - 19+ 3-dose series)    Depression Screen     Lipids           Wt Readings from Last 3 Encounters:   24 104.6 kg (230 lb 9.6 oz)   24 104.9 kg (231 lb 3.2 oz)   24 105.2 kg (232 lb)        Temp Readings from Last 3 Encounters:   23 98.1 °F (36.7 °C) (Oral)        BP Readings from Last 3 Encounters:   24 134/82   23 128/74   23 115/77        Pulse Readings from Last 3 Encounters:   24 68   23 60   22 70             No data to display                  Learning Assessment:          No data to display                  Fall Risk Assessment:   :         2022    12:02 PM 3/18/2022     6:49 AM 3/18/2022     6:48 AM   Amb Fall Risk Assessment and TUG Test   Total Score 1 1 0       Abuse Screening:   :          No data to display                   ADL Screening:   :          No data to display

## 2024-11-05 NOTE — PROGRESS NOTES
Luis Martinez is a 59 y.o. year old male seen in clinic today for a yearly physical exam.    Overall doing well. Has hx of dialted cardiomyopathy, PAF. Now using entresto since last December, EF was 30-35% and is now up 40-45%. Overall feels better. Also has ICD in place, gets checked for echo and ICD check yearly by Cardiology.   Rarely if every has SOB, only if he is holding his breath when carrying things at work.  Notes flecainide keeps him in rhythm and metoprolol 50 mg has helped even with occasional PVC symptoms.    Uses Dulera for prevention for chronic asthma, rarely needs albuterol.     Uses PRN xanax, VERY rare. Uses more as a crutch to know he has them.     Exercise mainly through work, with 5-7 miles walked a day. Eats heavier meals but infrequent.   Colonoscopy UTD through 2027, no changes in bowels.  PSA yearly, asymptomatic    he specifically denies any CP, SOB, HA. Dizziness, fevers, chills, N/V/D, urinary symptoms or other bowel changes.    Current Outpatient Medications on File Prior to Visit   Medication Sig Dispense Refill    flecainide (TAMBOCOR) 100 MG tablet Take 1 tablet by mouth twice daily 180 tablet 1    sacubitril-valsartan (ENTRESTO) 24-26 MG per tablet Take 1 tablet by mouth twice daily 60 tablet 3    atorvastatin (LIPITOR) 40 MG tablet Take 1 tablet by mouth once daily 90 tablet 0    metoprolol succinate (TOPROL XL) 50 MG extended release tablet Take 1 tablet by mouth once daily 90 tablet 1    DULERA 200-5 MCG/ACT inhaler INHALE 2 PUFFS TWICE DAILY 13 g 3    Aspirin 325 MG CAPS aspirin      Multiple Vitamins-Minerals (MULTIVITAMIN ADULT EXTRA C PO) Take 1 tablet by mouth daily      albuterol sulfate HFA (PROVENTIL;VENTOLIN;PROAIR) 108 (90 Base) MCG/ACT inhaler INHALE TWO PUFFS BY MOUTH EVERY 4 HOURS AS NEEDED FOR  WHEEZING      calcium carbonate (OS-NICOLE) 1250 (500 Ca) MG chewable tablet Take 2 tablets by mouth as needed      Cetirizine HCl 10 MG CAPS Take by mouth daily

## 2024-11-08 ENCOUNTER — TELEPHONE (OUTPATIENT)
Facility: CLINIC | Age: 59
End: 2024-11-08

## 2024-11-08 NOTE — TELEPHONE ENCOUNTER
PA for Wegovy 0.25mg has been denied.     Weight loss treatments, including but not limited to medications, diet supplements, and surgeries outside the scope of the [facility] program, are not covered.

## 2024-11-27 RX ORDER — ATORVASTATIN CALCIUM 40 MG/1
40 TABLET, FILM COATED ORAL DAILY
Qty: 90 TABLET | Refills: 3 | Status: SHIPPED | OUTPATIENT
Start: 2024-11-27

## 2024-12-09 RX ORDER — MOMETASONE FUROATE AND FORMOTEROL FUMARATE DIHYDRATE 200; 5 UG/1; UG/1
AEROSOL RESPIRATORY (INHALATION)
Qty: 13 G | Refills: 5 | Status: SHIPPED | OUTPATIENT
Start: 2024-12-09

## 2024-12-09 NOTE — TELEPHONE ENCOUNTER
PCP: Aguilar Carroll PA-C     Last appt:  11/5/2024      Future Appointments   Date Time Provider Department Center   10/14/2025  1:00 PM KATHLEEN MARIANO [facility]   10/14/2025  1:20 PM Dariusz Peters MD [facility]   11/7/2025  1:00 PM Aguilar Carroll PA-C [facility]          Requested Prescriptions     Pending Prescriptions Disp Refills    DULERA 200-5 MCG/ACT inhaler [Pharmacy Med Name: Dulera 200-5 MCG/ACT Inhalation Aerosol] 13 g 0     Sig: INHALE 2 PUFFS TWICE DAILY

## 2024-12-23 RX ORDER — SACUBITRIL AND VALSARTAN 24; 26 MG/1; MG/1
1 TABLET, FILM COATED ORAL 2 TIMES DAILY
Qty: 60 TABLET | Refills: 3 | Status: SHIPPED | OUTPATIENT
Start: 2024-12-23

## 2024-12-23 NOTE — TELEPHONE ENCOUNTER
Per VO of MD    LOV: 10/1/2024     Future Appointments   Date Time Provider Department Center   10/14/2025  1:00 PM KATHLEEN MARIANO BS AMB   10/14/2025  1:20 PM Enrique Peters MD CAVREY BS [facility]   11/7/2025  1:00 PM Aguilar Carroll PA-C [facility] ECC DEP       Requested Prescriptions     Signed Prescriptions Disp Refills    sacubitril-valsartan (ENTRESTO) 24-26 MG per tablet 60 tablet 3     Sig: Take 1 tablet by mouth twice daily     Authorizing Provider: ENRIQUE PETERS     Ordering User: JASON MIRELES

## 2025-01-15 RX ORDER — METOPROLOL SUCCINATE 50 MG/1
TABLET, EXTENDED RELEASE ORAL
Qty: 90 TABLET | Refills: 1 | Status: SHIPPED | OUTPATIENT
Start: 2025-01-15

## 2025-01-15 NOTE — TELEPHONE ENCOUNTER
VO per NP    Future Appointments   Date Time Provider Department Center   10/14/2025  1:00 PM PACEMAKER3, KATHLEEN RICHEY BS AMB   10/14/2025  1:20 PM Dariusz Peters MD CAV[facility]Y BS AMB   11/7/2025  1:00 PM Aguilar Carroll, YG BSIMA [facility] DEP

## 2025-01-30 ENCOUNTER — TELEPHONE (OUTPATIENT)
Age: 60
End: 2025-01-30

## 2025-01-30 NOTE — TELEPHONE ENCOUNTER
Patient is calling because he needs an antibiotic before he has his dental appointment.Patient is schedule to go on 2/4/25. Patient needs a prescription for Clindamycin 150 mg.    Dr.Steven Cabrera  [phone] office    Pharmacy:  [address] - P [phone] - F [phone]     [phone] patient

## 2025-01-31 RX ORDER — CLINDAMYCIN HYDROCHLORIDE 150 MG/1
150 CAPSULE ORAL AS NEEDED
Qty: 1 CAPSULE | Refills: 1 | Status: SHIPPED | OUTPATIENT
Start: 2025-01-31

## 2025-01-31 RX ORDER — CLINDAMYCIN HYDROCHLORIDE 150 MG/1
150 CAPSULE ORAL ONCE
Qty: 1 CAPSULE | Refills: 0 | Status: SHIPPED | OUTPATIENT
Start: 2025-01-31 | End: 2025-01-31

## 2025-01-31 NOTE — TELEPHONE ENCOUNTER
With the history of congenital heart defect, would go ahead & cover with antibiotics.  We can send for clindamycin 150 mg po x 1, to be taken 30 minutes to 1 hour prior to dental procedure.  We can send with 1 refill as well.

## 2025-03-12 RX ORDER — FLECAINIDE ACETATE 100 MG/1
100 TABLET ORAL 2 TIMES DAILY
Qty: 180 TABLET | Refills: 1 | Status: SHIPPED | OUTPATIENT
Start: 2025-03-12

## 2025-03-12 NOTE — TELEPHONE ENCOUNTER
VO per NP    Future Appointments   Date Time Provider Department Center   10/14/2025  1:00 PM PACEMAKER3, KATHLEEN RICHEY BS AMB   10/14/2025  1:20 PM Dariusz Peters MD CAVREY BS AMB   11/7/2025  1:00 PM Aguilar Carroll, YG BSIMA [facility] DEP

## 2025-04-14 RX ORDER — SACUBITRIL AND VALSARTAN 24; 26 MG/1; MG/1
1 TABLET, FILM COATED ORAL 2 TIMES DAILY
Qty: 60 TABLET | Refills: 2 | Status: SHIPPED | OUTPATIENT
Start: 2025-04-14

## 2025-04-14 NOTE — TELEPHONE ENCOUNTER
Med refilled per VO by MD.    Future Appointments   Date Time Provider Department Center   10/14/2025  1:00 PM KUSHAL3, KATHLEEN RICHEY BS AMB   10/14/2025  1:20 PM Dariusz Peters MD CAVREY BS AMB   11/7/2025  1:00 PM Aguilar Carroll PA-C BSIMA BS ECC DEP

## 2025-07-07 RX ORDER — METOPROLOL SUCCINATE 50 MG/1
50 TABLET, EXTENDED RELEASE ORAL DAILY
Qty: 90 TABLET | Refills: 1 | Status: SHIPPED | OUTPATIENT
Start: 2025-07-07

## 2025-07-18 RX ORDER — SACUBITRIL AND VALSARTAN 24; 26 MG/1; MG/1
1 TABLET, FILM COATED ORAL 2 TIMES DAILY
Qty: 60 TABLET | Refills: 1 | Status: SHIPPED | OUTPATIENT
Start: 2025-07-18

## 2025-07-18 NOTE — TELEPHONE ENCOUNTER
Med refilled per VO by MD.    Future Appointments   Date Time Provider Department Center   10/30/2025  1:00 PM PACEMAKER3, KATHLEEN RICHEY BS AMB   10/30/2025  1:20 PM Karan Francisco MD CAVREY BS AMB   11/7/2025  1:00 PM [facility]

## 2025-09-04 RX ORDER — FLECAINIDE ACETATE 100 MG/1
100 TABLET ORAL 2 TIMES DAILY
Qty: 120 TABLET | Refills: 0 | Status: SHIPPED | OUTPATIENT
Start: 2025-09-04

## (undated) DEVICE — AGENT HEMSTAT 3GM PURIFIED PLNT STARCH PWD ABSRB ARISTA AH

## (undated) DEVICE — Device: Brand: MEDEX

## (undated) DEVICE — RADIFOCUS GLIDEWIRE: Brand: GLIDEWIRE

## (undated) DEVICE — 3M™ IOBAN™ 2 ANTIMICROBIAL INCISE DRAPE 6650EZ: Brand: IOBAN™ 2

## (undated) DEVICE — 1200 GUARD II KIT W/5MM TUBE W/O VAC TUBE: Brand: GUARDIAN

## (undated) DEVICE — Z DISCONTINUED PER MEDLINE LINE GAS SAMPLING O2/CO2 LNG AD 13 FT NSL W/ TBNG FILTERLINE

## (undated) DEVICE — CATHETER EP 6FR L92CM 2-5-2MM SPC TIP 1MM 4 ELECTRD D CRV

## (undated) DEVICE — SOLIDIFIER MEDC 1200ML -- CONVERT TO 356117

## (undated) DEVICE — CATH IV AUTOGRD BC PNK 20GA 25 -- INSYTE

## (undated) DEVICE — KENDALL RADIOLUCENT FOAM MONITORING ELECTRODE RECTANGULAR SHAPE: Brand: KENDALL

## (undated) DEVICE — (D)SYR 10ML 1/5ML GRAD NSAF -- PKGING CHANGE USE ITEM 338027

## (undated) DEVICE — CATHETER PULM ART 7FR BLLN 1.25CC L110CM DIA11MM DBL LUMN

## (undated) DEVICE — TOWEL 4 PLY TISS 19X30 SUE WHT

## (undated) DEVICE — SYR 3ML LL TIP 1/10ML GRAD --

## (undated) DEVICE — REM POLYHESIVE ADULT PATIENT RETURN ELECTRODE: Brand: VALLEYLAB

## (undated) DEVICE — SUTURE DEV SZ 3-0 V-LOC 90 L12IN TO L18IN CV-23 VLT VLOCM0844

## (undated) DEVICE — MEDI-TRACE CADENCE ADULT, DEFIBRILLATION ELECTRODE -RTS  (10 PR/PK) - PHYSIO-CONTROL: Brand: MEDI-TRACE CADENCE

## (undated) DEVICE — CATHETER LD DEL OD9FR ID7FR L51CM 115DEG CRV OUTER GUID
Type: IMPLANTABLE DEVICE | Status: NON-FUNCTIONAL
Removed: 2022-03-18

## (undated) DEVICE — NEEDLE HYPO 18GA L1.5IN PNK S STL HUB POLYPR SHLD REG BVL

## (undated) DEVICE — CATHETER DIAG 5FR L100CM AL AL 2.0 CRV SZ DBL BRAID WIRE

## (undated) DEVICE — NEONATAL-ADULT SPO2 SENSOR: Brand: NELLCOR

## (undated) DEVICE — Device

## (undated) DEVICE — SET ADMIN 16ML TBNG L100IN 2 Y INJ SITE IV PIGGY BK DISP

## (undated) DEVICE — SUTURE ETHBND EXCEL SZ 2 L30IN NONABSORBABLE GRN L40MM V-37 MX69G

## (undated) DEVICE — DRESSING ANTIMIC FOAM OPTIFOAM POSTOP ADH 4 X 6 IN

## (undated) DEVICE — PACEMAKER SETUP: Brand: MEDLINE INDUSTRIES, INC.

## (undated) DEVICE — SUTURE V-LOC 180 SZ 2-0 L9IN ABSRB VLT GS-21 L37MM 1/2 CIR VLOCM0345

## (undated) DEVICE — INTRODUCER SHTH L13CM OD10.5FR WHT PRELUDE

## (undated) DEVICE — CATH IV AUTOGRD BC BLU 22GA 25 -- INSYTE

## (undated) DEVICE — GDWIRE WHISPER HITORQ EDS CSJ -- ACUITY SOLD BY BX ONLY 4648

## (undated) DEVICE — SLING ORTHOPEDIC PCH UNIV 19.5X9 IN 2-39 IN ARM W/ FOAM STRP

## (undated) DEVICE — BASIN EMESIS 500CC ROSE 250/CS 60/PLT: Brand: MEDEGEN MEDICAL PRODUCTS, LLC